# Patient Record
Sex: MALE | Race: WHITE | NOT HISPANIC OR LATINO | Employment: UNEMPLOYED | URBAN - NONMETROPOLITAN AREA
[De-identification: names, ages, dates, MRNs, and addresses within clinical notes are randomized per-mention and may not be internally consistent; named-entity substitution may affect disease eponyms.]

---

## 2017-03-22 ENCOUNTER — APPOINTMENT (OUTPATIENT)
Dept: LAB | Facility: HOSPITAL | Age: 52
End: 2017-03-22
Payer: MEDICARE

## 2017-03-22 ENCOUNTER — TRANSCRIBE ORDERS (OUTPATIENT)
Dept: ADMINISTRATIVE | Facility: HOSPITAL | Age: 52
End: 2017-03-22

## 2017-03-22 DIAGNOSIS — I10 UNSPECIFIED ESSENTIAL HYPERTENSION: ICD-10-CM

## 2017-03-22 DIAGNOSIS — R53.81 OTHER MALAISE AND FATIGUE: Primary | ICD-10-CM

## 2017-03-22 DIAGNOSIS — R53.83 OTHER MALAISE AND FATIGUE: Primary | ICD-10-CM

## 2017-03-22 DIAGNOSIS — D64.9 ANEMIA, UNSPECIFIED: ICD-10-CM

## 2017-03-22 DIAGNOSIS — R53.81 OTHER MALAISE AND FATIGUE: ICD-10-CM

## 2017-03-22 DIAGNOSIS — R53.83 OTHER MALAISE AND FATIGUE: ICD-10-CM

## 2017-03-22 LAB
ALBUMIN SERPL BCP-MCNC: 4 G/DL (ref 3.5–5)
ALP SERPL-CCNC: 85 U/L (ref 46–116)
ALT SERPL W P-5'-P-CCNC: 32 U/L (ref 12–78)
ANION GAP SERPL CALCULATED.3IONS-SCNC: 11 MMOL/L (ref 4–13)
AST SERPL W P-5'-P-CCNC: 30 U/L (ref 5–45)
BASOPHILS # BLD AUTO: 0.03 THOUSANDS/ΜL (ref 0–0.1)
BASOPHILS NFR BLD AUTO: 0 % (ref 0–1)
BILIRUB SERPL-MCNC: 0.5 MG/DL (ref 0.2–1)
BUN SERPL-MCNC: 24 MG/DL (ref 5–25)
CALCIUM SERPL-MCNC: 9.1 MG/DL (ref 8.3–10.1)
CHLORIDE SERPL-SCNC: 104 MMOL/L (ref 100–108)
CO2 SERPL-SCNC: 25 MMOL/L (ref 21–32)
CREAT SERPL-MCNC: 2.76 MG/DL (ref 0.6–1.3)
EOSINOPHIL # BLD AUTO: 0.37 THOUSAND/ΜL (ref 0–0.61)
EOSINOPHIL NFR BLD AUTO: 5 % (ref 0–6)
ERYTHROCYTE [DISTWIDTH] IN BLOOD BY AUTOMATED COUNT: 13.2 % (ref 11.6–15.1)
GFR SERPL CREATININE-BSD FRML MDRD: 24.2 ML/MIN/1.73SQ M
GLUCOSE P FAST SERPL-MCNC: 105 MG/DL (ref 65–99)
HCT VFR BLD AUTO: 49.4 % (ref 36.5–49.3)
HGB BLD-MCNC: 16.9 G/DL (ref 12–17)
LDLC SERPL DIRECT ASSAY-MCNC: 141 MG/DL (ref 0–100)
LYMPHOCYTES # BLD AUTO: 2.23 THOUSANDS/ΜL (ref 0.6–4.47)
LYMPHOCYTES NFR BLD AUTO: 29 % (ref 14–44)
MCH RBC QN AUTO: 31.1 PG (ref 26.8–34.3)
MCHC RBC AUTO-ENTMCNC: 34.2 G/DL (ref 31.4–37.4)
MCV RBC AUTO: 91 FL (ref 82–98)
MONOCYTES # BLD AUTO: 0.69 THOUSAND/ΜL (ref 0.17–1.22)
MONOCYTES NFR BLD AUTO: 9 % (ref 4–12)
NEUTROPHILS # BLD AUTO: 4.46 THOUSANDS/ΜL (ref 1.85–7.62)
NEUTS SEG NFR BLD AUTO: 57 % (ref 43–75)
PLATELET # BLD AUTO: 237 THOUSANDS/UL (ref 149–390)
PMV BLD AUTO: 9.2 FL (ref 8.9–12.7)
POTASSIUM SERPL-SCNC: 4.5 MMOL/L (ref 3.5–5.3)
PROT SERPL-MCNC: 7.2 G/DL (ref 6.4–8.2)
RBC # BLD AUTO: 5.44 MILLION/UL (ref 3.88–5.62)
SODIUM SERPL-SCNC: 140 MMOL/L (ref 136–145)
WBC # BLD AUTO: 7.78 THOUSAND/UL (ref 4.31–10.16)

## 2017-03-22 PROCEDURE — 80053 COMPREHEN METABOLIC PANEL: CPT

## 2017-03-22 PROCEDURE — 83721 ASSAY OF BLOOD LIPOPROTEIN: CPT

## 2017-03-22 PROCEDURE — 85025 COMPLETE CBC W/AUTO DIFF WBC: CPT

## 2017-03-22 PROCEDURE — 36415 COLL VENOUS BLD VENIPUNCTURE: CPT

## 2017-07-24 ENCOUNTER — TRANSCRIBE ORDERS (OUTPATIENT)
Dept: ADMINISTRATIVE | Facility: HOSPITAL | Age: 52
End: 2017-07-24

## 2017-07-24 ENCOUNTER — APPOINTMENT (OUTPATIENT)
Dept: LAB | Facility: HOSPITAL | Age: 52
End: 2017-07-24
Payer: MEDICARE

## 2017-07-24 DIAGNOSIS — E87.6 HYPOPOTASSEMIA: ICD-10-CM

## 2017-07-24 DIAGNOSIS — C67.9 MALIGNANT NEOPLASM OF URINARY BLADDER, UNSPECIFIED SITE (HCC): ICD-10-CM

## 2017-07-24 DIAGNOSIS — E87.2 ACIDOSIS: ICD-10-CM

## 2017-07-24 DIAGNOSIS — I10 UNSPECIFIED ESSENTIAL HYPERTENSION: ICD-10-CM

## 2017-07-24 DIAGNOSIS — N18.4 CHRONIC KIDNEY DISEASE, STAGE IV (SEVERE) (HCC): Primary | ICD-10-CM

## 2017-07-24 DIAGNOSIS — N18.4 CHRONIC KIDNEY DISEASE, STAGE IV (SEVERE) (HCC): ICD-10-CM

## 2017-07-24 LAB
ALBUMIN SERPL BCP-MCNC: 3.8 G/DL (ref 3.5–5)
ANION GAP SERPL CALCULATED.3IONS-SCNC: 13 MMOL/L (ref 4–13)
BUN SERPL-MCNC: 26 MG/DL (ref 5–25)
CALCIUM SERPL-MCNC: 9.1 MG/DL (ref 8.3–10.1)
CHLORIDE SERPL-SCNC: 104 MMOL/L (ref 100–108)
CO2 SERPL-SCNC: 25 MMOL/L (ref 21–32)
CREAT SERPL-MCNC: 2.84 MG/DL (ref 0.6–1.3)
GFR SERPL CREATININE-BSD FRML MDRD: 24 ML/MIN/1.73SQ M
GLUCOSE SERPL-MCNC: 104 MG/DL (ref 65–140)
HGB BLD-MCNC: 16.6 G/DL (ref 12–17)
MAGNESIUM SERPL-MCNC: 2.1 MG/DL (ref 1.6–2.6)
PHOSPHATE SERPL-MCNC: 3.6 MG/DL (ref 2.7–4.5)
POTASSIUM SERPL-SCNC: 3.8 MMOL/L (ref 3.5–5.3)
PTH-INTACT SERPL-MCNC: 83.6 PG/ML (ref 14–72)
SODIUM SERPL-SCNC: 142 MMOL/L (ref 136–145)
URATE SERPL-MCNC: 7 MG/DL (ref 4.2–8)

## 2017-07-24 PROCEDURE — 84550 ASSAY OF BLOOD/URIC ACID: CPT

## 2017-07-24 PROCEDURE — 83735 ASSAY OF MAGNESIUM: CPT

## 2017-07-24 PROCEDURE — 36415 COLL VENOUS BLD VENIPUNCTURE: CPT

## 2017-07-24 PROCEDURE — 80069 RENAL FUNCTION PANEL: CPT

## 2017-07-24 PROCEDURE — 83970 ASSAY OF PARATHORMONE: CPT

## 2017-07-24 PROCEDURE — 85018 HEMOGLOBIN: CPT

## 2017-08-08 ENCOUNTER — GENERIC CONVERSION - ENCOUNTER (OUTPATIENT)
Dept: OTHER | Facility: OTHER | Age: 52
End: 2017-08-08

## 2017-08-08 DIAGNOSIS — C67.6 MALIGNANT NEOPLASM OF URETERIC ORIFICE (HCC): ICD-10-CM

## 2017-08-21 ENCOUNTER — GENERIC CONVERSION - ENCOUNTER (OUTPATIENT)
Dept: OTHER | Facility: OTHER | Age: 52
End: 2017-08-21

## 2018-01-22 VITALS
WEIGHT: 194 LBS | BODY MASS INDEX: 27.16 KG/M2 | SYSTOLIC BLOOD PRESSURE: 134 MMHG | HEIGHT: 71 IN | DIASTOLIC BLOOD PRESSURE: 66 MMHG

## 2018-02-23 DIAGNOSIS — N52.8 OTHER MALE ERECTILE DYSFUNCTION: Primary | ICD-10-CM

## 2018-02-23 NOTE — TELEPHONE ENCOUNTER
Patient needs order for new medication or help getting this med,will also send to [facility] to see if she has any information for him

## 2018-03-12 NOTE — TELEPHONE ENCOUNTER
Patient called me back today and verified he's using 1 0mL per injection with success  Information given on [facility]  Script called in for Alprostadil 20mcg/mL Sig: Injection 1 0mL IC 20 minutes prior to sexual activity  Dispense: 5-2 5mL vials with 3 refills; spoke with Jef Kincaid Ph

## 2018-03-14 DIAGNOSIS — C67.6 MALIGNANT NEOPLASM OF URETERIC ORIFICE (HCC): Primary | ICD-10-CM

## 2018-03-14 DIAGNOSIS — N52.32 ERECTILE DYSFUNCTION FOLLOWING RADICAL CYSTECTOMY: ICD-10-CM

## 2018-03-14 NOTE — TELEPHONE ENCOUNTER
An Auto-fax Refill Request for Marinol (dronabinol) 5mg was received from [facility]/pharmacy #[id number] Patient was last seen in June, 2017 by Dr Dora Garcia; continuation of the medication was approved at that time  Please refill/E-scribe script  Thank you!

## 2018-03-15 RX ORDER — DRONABINOL 5 MG/1
5 CAPSULE ORAL DAILY
Qty: 90 CAPSULE | Refills: 0 | Status: SHIPPED | OUTPATIENT
Start: 2018-03-15 | End: 2018-07-25 | Stop reason: SDUPTHER

## 2018-04-10 ENCOUNTER — TRANSCRIBE ORDERS (OUTPATIENT)
Dept: ADMINISTRATIVE | Facility: HOSPITAL | Age: 53
End: 2018-04-10

## 2018-04-10 ENCOUNTER — APPOINTMENT (OUTPATIENT)
Dept: LAB | Facility: HOSPITAL | Age: 53
End: 2018-04-10
Payer: MEDICARE

## 2018-04-10 DIAGNOSIS — N18.4 CHRONIC KIDNEY DISEASE, STAGE IV (SEVERE) (HCC): Primary | ICD-10-CM

## 2018-04-10 DIAGNOSIS — D64.9 ANEMIA, UNSPECIFIED TYPE: ICD-10-CM

## 2018-04-10 DIAGNOSIS — E03.9 MYXEDEMA HEART DISEASE: ICD-10-CM

## 2018-04-10 DIAGNOSIS — C67.9 MALIGNANT NEOPLASM OF URINARY BLADDER, UNSPECIFIED SITE (HCC): ICD-10-CM

## 2018-04-10 DIAGNOSIS — D64.9 ANEMIA, UNSPECIFIED TYPE: Primary | ICD-10-CM

## 2018-04-10 DIAGNOSIS — E87.2 ACIDOSIS: ICD-10-CM

## 2018-04-10 DIAGNOSIS — I51.9 MYXEDEMA HEART DISEASE: ICD-10-CM

## 2018-04-10 DIAGNOSIS — I10 ESSENTIAL HYPERTENSION, MALIGNANT: ICD-10-CM

## 2018-04-10 DIAGNOSIS — N42.89 ATROPHY OF PROSTATE: ICD-10-CM

## 2018-04-10 DIAGNOSIS — E78.2 MIXED HYPERLIPIDEMIA: ICD-10-CM

## 2018-04-10 DIAGNOSIS — N18.4 CHRONIC KIDNEY DISEASE, STAGE IV (SEVERE) (HCC): ICD-10-CM

## 2018-04-10 LAB
ALBUMIN SERPL BCP-MCNC: 4 G/DL (ref 3.5–5)
ALP SERPL-CCNC: 89 U/L (ref 46–116)
ALT SERPL W P-5'-P-CCNC: 31 U/L (ref 12–78)
ANION GAP SERPL CALCULATED.3IONS-SCNC: 12 MMOL/L (ref 4–13)
AST SERPL W P-5'-P-CCNC: 20 U/L (ref 5–45)
BASOPHILS # BLD AUTO: 0.07 THOUSANDS/ΜL (ref 0–0.1)
BASOPHILS NFR BLD AUTO: 1 % (ref 0–1)
BILIRUB SERPL-MCNC: 0.5 MG/DL (ref 0.2–1)
BUN SERPL-MCNC: 32 MG/DL (ref 5–25)
CALCIUM SERPL-MCNC: 9.7 MG/DL
CHLORIDE SERPL-SCNC: 106 MMOL/L (ref 100–108)
CHOLEST SERPL-MCNC: 185 MG/DL (ref 50–200)
CO2 SERPL-SCNC: 24 MMOL/L (ref 21–32)
CREAT SERPL-MCNC: 2.36 MG/DL (ref 0.6–1.3)
CREAT UR-MCNC: 44.5 MG/DL
EOSINOPHIL # BLD AUTO: 0.22 THOUSAND/ΜL (ref 0–0.61)
EOSINOPHIL NFR BLD AUTO: 3 % (ref 0–6)
ERYTHROCYTE [DISTWIDTH] IN BLOOD BY AUTOMATED COUNT: 13.3 % (ref 11.6–15.1)
EST. AVERAGE GLUCOSE BLD GHB EST-MCNC: 114 MG/DL
GFR SERPL CREATININE-BSD FRML MDRD: 30 ML/MIN/1.73SQ M
GLUCOSE P FAST SERPL-MCNC: 105 MG/DL (ref 65–99)
HBA1C MFR BLD: 5.6 % (ref 4.2–6.3)
HCT VFR BLD AUTO: 48 % (ref 36.5–49.3)
HDLC SERPL-MCNC: 53 MG/DL (ref 40–60)
HGB BLD-MCNC: 16.9 G/DL (ref 12–17)
LDLC SERPL CALC-MCNC: 115 MG/DL (ref 0–100)
LYMPHOCYTES # BLD AUTO: 2.01 THOUSANDS/ΜL (ref 0.6–4.47)
LYMPHOCYTES NFR BLD AUTO: 23 % (ref 14–44)
MAGNESIUM SERPL-MCNC: 2 MG/DL (ref 1.6–2.6)
MCH RBC QN AUTO: 31.8 PG (ref 26.8–34.3)
MCHC RBC AUTO-ENTMCNC: 35.2 G/DL (ref 31.4–37.4)
MCV RBC AUTO: 90 FL (ref 82–98)
MONOCYTES # BLD AUTO: 0.77 THOUSAND/ΜL (ref 0.17–1.22)
MONOCYTES NFR BLD AUTO: 9 % (ref 4–12)
NEUTROPHILS # BLD AUTO: 5.8 THOUSANDS/ΜL (ref 1.85–7.62)
NEUTS SEG NFR BLD AUTO: 64 % (ref 43–75)
NONHDLC SERPL-MCNC: 132 MG/DL
PHOSPHATE SERPL-MCNC: 2.6 MG/DL (ref 2.7–4.5)
PLATELET # BLD AUTO: 237 THOUSANDS/UL (ref 149–390)
PMV BLD AUTO: 9.3 FL (ref 8.9–12.7)
POTASSIUM SERPL-SCNC: 4.3 MMOL/L (ref 3.5–5.3)
PROT SERPL-MCNC: 7.2 G/DL (ref 6.4–8.2)
PROT UR-MCNC: 27 MG/DL
PROT/CREAT UR: 0.61 MG/G{CREAT} (ref 0–0.1)
PSA SERPL-MCNC: <0.1 NG/ML (ref 0–4)
PTH-INTACT SERPL-MCNC: 62.1 PG/ML (ref 18.4–80.1)
RBC # BLD AUTO: 5.31 MILLION/UL (ref 3.88–5.62)
SODIUM SERPL-SCNC: 142 MMOL/L (ref 136–145)
T4 FREE SERPL-MCNC: 1.18 NG/DL (ref 0.76–1.46)
TRIGL SERPL-MCNC: 86 MG/DL
TSH SERPL DL<=0.05 MIU/L-ACNC: 1.22 UIU/ML (ref 0.36–3.74)
URATE SERPL-MCNC: 5.7 MG/DL (ref 4.2–8)
WBC # BLD AUTO: 8.87 THOUSAND/UL (ref 4.31–10.16)

## 2018-04-10 PROCEDURE — 84439 ASSAY OF FREE THYROXINE: CPT

## 2018-04-10 PROCEDURE — 84156 ASSAY OF PROTEIN URINE: CPT | Performed by: INTERNAL MEDICINE

## 2018-04-10 PROCEDURE — 83735 ASSAY OF MAGNESIUM: CPT

## 2018-04-10 PROCEDURE — G0103 PSA SCREENING: HCPCS

## 2018-04-10 PROCEDURE — 36415 COLL VENOUS BLD VENIPUNCTURE: CPT

## 2018-04-10 PROCEDURE — 82570 ASSAY OF URINE CREATININE: CPT | Performed by: INTERNAL MEDICINE

## 2018-04-10 PROCEDURE — 80061 LIPID PANEL: CPT

## 2018-04-10 PROCEDURE — 85025 COMPLETE CBC W/AUTO DIFF WBC: CPT

## 2018-04-10 PROCEDURE — 83970 ASSAY OF PARATHORMONE: CPT

## 2018-04-10 PROCEDURE — 84550 ASSAY OF BLOOD/URIC ACID: CPT

## 2018-04-10 PROCEDURE — 84443 ASSAY THYROID STIM HORMONE: CPT

## 2018-04-10 PROCEDURE — 84100 ASSAY OF PHOSPHORUS: CPT

## 2018-04-10 PROCEDURE — 80053 COMPREHEN METABOLIC PANEL: CPT

## 2018-07-25 DIAGNOSIS — C67.6 MALIGNANT NEOPLASM OF URETERIC ORIFICE (HCC): ICD-10-CM

## 2018-07-25 RX ORDER — DRONABINOL 5 MG/1
5 CAPSULE ORAL DAILY
Qty: 90 CAPSULE | Refills: 0 | OUTPATIENT
Start: 2018-07-25 | End: 2019-03-06 | Stop reason: SDUPTHER

## 2018-07-25 NOTE — TELEPHONE ENCOUNTER
An Auto-fax Refill Request for Marinol (dronabinol) 5mg was received from [facility]/pharmacy #[id number] Patient has an upcoming office visit scheduled on 8/14/18 and will run out of medication until then  Script called into [facility]/pharmacy #[id number]; order given to Enio Freedman Ph   Script for same, 90 day supply with NO refills was queued and forwarded to Dr Jamari Orozco for approval

## 2018-08-02 ENCOUNTER — HOSPITAL ENCOUNTER (OUTPATIENT)
Dept: ULTRASOUND IMAGING | Facility: HOSPITAL | Age: 53
Discharge: HOME | End: 2018-08-02
Attending: UROLOGY
Payer: MEDICARE

## 2018-08-02 DIAGNOSIS — C67.6 MALIGNANT NEOPLASM OF URETERIC ORIFICE (HCC): ICD-10-CM

## 2018-08-02 PROCEDURE — 76775 US EXAM ABDO BACK WALL LIM: CPT

## 2018-08-08 RX ORDER — POTASSIUM CITRATE 15 MEQ/1
TABLET, EXTENDED RELEASE ORAL
COMMUNITY

## 2018-08-08 RX ORDER — ERGOCALCIFEROL (VITAMIN D2) 10 MCG
1 TABLET ORAL DAILY
COMMUNITY

## 2018-08-08 RX ORDER — AMLODIPINE BESYLATE 10 MG/1
TABLET ORAL
COMMUNITY
Start: 2011-06-17 | End: 2021-08-23 | Stop reason: SDUPTHER

## 2018-08-08 RX ORDER — SODIUM BICARBONATE 650 MG/1
TABLET ORAL
COMMUNITY

## 2018-08-08 RX ORDER — METOPROLOL SUCCINATE 25 MG/1
1 TABLET, EXTENDED RELEASE ORAL DAILY
COMMUNITY
End: 2021-08-23 | Stop reason: SDUPTHER

## 2018-08-14 ENCOUNTER — OFFICE VISIT (OUTPATIENT)
Dept: UROLOGY | Facility: MEDICAL CENTER | Age: 53
End: 2018-08-14
Payer: MEDICARE

## 2018-08-14 VITALS
DIASTOLIC BLOOD PRESSURE: 70 MMHG | WEIGHT: 194 LBS | HEIGHT: 70 IN | SYSTOLIC BLOOD PRESSURE: 118 MMHG | BODY MASS INDEX: 27.77 KG/M2

## 2018-08-14 DIAGNOSIS — C67.6 MALIGNANT NEOPLASM OF URETERIC ORIFICE (HCC): Primary | ICD-10-CM

## 2018-08-14 DIAGNOSIS — N26.1 ATROPHY OF RIGHT KIDNEY: ICD-10-CM

## 2018-08-14 DIAGNOSIS — N52.32 ERECTILE DYSFUNCTION FOLLOWING RADICAL CYSTECTOMY: ICD-10-CM

## 2018-08-14 DIAGNOSIS — Z90.6 HX OF TOTAL CYSTECTOMY: ICD-10-CM

## 2018-08-14 PROCEDURE — 99214 OFFICE O/P EST MOD 30 MIN: CPT | Performed by: UROLOGY

## 2018-08-14 NOTE — PROGRESS NOTES
Assessment/Plan:      Diagnoses and all orders for this visit:    Malignant neoplasm of ureteric orifice (HCC)  -     XR chest pa & lateral; Future  -     US kidney and bladder; Future    Erectile dysfunction following radical cystectomy    Hx of total cystectomy  -     XR chest pa & lateral; Future  -     US kidney and bladder; Future    Atrophy of right kidney  -     XR chest pa & lateral; Future  -     US kidney and bladder; Future          Subjective:  No complaints     Patient ID: Bucky Perkins is a 45 y o  male  HPI  He is here for follow-up of his bladder cancer treatment  He had undergone a radical cystectomy with ileal conduit urinary diversion 9 years ago, which was followed by chemotherapy, 8 years ago  He has done well since, in terms of his disease free status  His stoma and appliance appliance are working well, and he is not having any issues with it  He has known atrophy of his right kidney  He denies any pains, weight loss, or hematuria  He has a good appetite and bowel function  Concerning his ED he has been using penile injection therapy with Edex  Review of Systems   Constitutional: Negative  Negative for unexpected weight change  HENT: Negative  Eyes: Negative  Respiratory: Negative  Cardiovascular: Negative  Gastrointestinal: Negative  Negative for abdominal pain  Endocrine: Negative  Genitourinary: Negative  Negative for flank pain and hematuria  Musculoskeletal: Negative  Skin: Negative  Allergic/Immunologic: Negative  Neurological: Negative  Hematological: Negative  Psychiatric/Behavioral: Negative  Objective:     Physical Exam   Constitutional: He is oriented to person, place, and time  He appears well-developed and well-nourished  No distress  HENT:   Head: Normocephalic and atraumatic     Nose: Nose normal    Mouth/Throat: Oropharynx is clear and moist    Eyes: Conjunctivae and EOM are normal  Pupils are equal, round, and reactive to light  No scleral icterus  Neck: Normal range of motion  Neck supple  Cardiovascular: Normal rate, regular rhythm, normal heart sounds and intact distal pulses  No murmur heard  Pulmonary/Chest: Effort normal and breath sounds normal  No respiratory distress  He has no wheezes  He has no rales  Abdominal: Soft  Bowel sounds are normal  He exhibits no distension and no mass  There is no tenderness  There is no CVA tenderness  Hernia confirmed negative in the ventral area  Ileal stoma intact, appliance fits well  Musculoskeletal: Normal range of motion  He exhibits no edema or tenderness  Lymphadenopathy:     He has no cervical adenopathy  Neurological: He is alert and oriented to person, place, and time  No cranial nerve deficit  Skin: Skin is warm and dry  No rash noted  No erythema  No pallor  Psychiatric: He has a normal mood and affect  His behavior is normal  Judgment and thought content normal    Nursing note and vitals reviewed  Renal ultrasound from 08/02/2018: IMPRESSION:     1  Mild bilateral pelviectasis and proximal hydroureter is similar to priors dating back to 2014  No evidence for worsening obstruction      2   Lobular left kidney and atrophic right kidney  Appearance of the kidneys is stable from priors      3   Punctate nonobstructive right upper pole nephrolith is demonstrated

## 2019-02-21 ENCOUNTER — TELEPHONE (OUTPATIENT)
Dept: UROLOGY | Facility: AMBULATORY SURGERY CENTER | Age: 54
End: 2019-02-21

## 2019-02-21 NOTE — TELEPHONE ENCOUNTER
Patient called wanted to order ostomy bags  Kevin patient care number [phone]  If you have any questions you can call patient on his mobile

## 2019-02-22 ENCOUNTER — APPOINTMENT (EMERGENCY)
Dept: RADIOLOGY | Facility: HOSPITAL | Age: 54
End: 2019-02-22
Payer: COMMERCIAL

## 2019-02-22 ENCOUNTER — HOSPITAL ENCOUNTER (EMERGENCY)
Facility: HOSPITAL | Age: 54
Discharge: HOME | End: 2019-02-22
Attending: EMERGENCY MEDICINE | Admitting: EMERGENCY MEDICINE
Payer: COMMERCIAL

## 2019-02-22 VITALS
OXYGEN SATURATION: 100 % | RESPIRATION RATE: 18 BRPM | TEMPERATURE: 97.5 F | HEART RATE: 70 BPM | SYSTOLIC BLOOD PRESSURE: 147 MMHG | WEIGHT: 190 LBS | BODY MASS INDEX: 26.6 KG/M2 | DIASTOLIC BLOOD PRESSURE: 78 MMHG | HEIGHT: 71 IN

## 2019-02-22 DIAGNOSIS — S01.511A LIP LACERATION, INITIAL ENCOUNTER: ICD-10-CM

## 2019-02-22 DIAGNOSIS — S51.819A SKIN TEAR OF FOREARM WITHOUT COMPLICATION: ICD-10-CM
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
## 2019-02-22 DIAGNOSIS — M79.603 ARM PAIN: ICD-10-CM

## 2019-02-22 PROCEDURE — 73090 X-RAY EXAM OF FOREARM: CPT

## 2019-02-22 PROCEDURE — 70450 CT HEAD/BRAIN W/O DYE: CPT

## 2019-02-22 PROCEDURE — 99284 EMERGENCY DEPT VISIT MOD MDM: CPT

## 2019-02-22 PROCEDURE — 90715 TDAP VACCINE 7 YRS/> IM: CPT | Performed by: EMERGENCY MEDICINE

## 2019-02-22 PROCEDURE — 90471 IMMUNIZATION ADMIN: CPT

## 2019-02-22 RX ORDER — ACETAMINOPHEN 325 MG/1
975 TABLET ORAL ONCE
Status: COMPLETED | OUTPATIENT
Start: 2019-02-22 | End: 2019-02-22

## 2019-02-22 RX ORDER — LIDOCAINE HYDROCHLORIDE AND EPINEPHRINE 10; 10 MG/ML; UG/ML
10 INJECTION, SOLUTION INFILTRATION; PERINEURAL ONCE
Status: COMPLETED | OUTPATIENT
Start: 2019-02-22 | End: 2019-02-22

## 2019-02-22 RX ADMIN — TETANUS TOXOID, REDUCED DIPHTHERIA TOXOID AND ACELLULAR PERTUSSIS VACCINE, ADSORBED 0.5 ML: 5; 2.5; 8; 8; 2.5 SUSPENSION INTRAMUSCULAR at 01:20

## 2019-02-22 RX ADMIN — LIDOCAINE HYDROCHLORIDE,EPINEPHRINE BITARTRATE 10 ML: 10; .01 INJECTION, SOLUTION INFILTRATION; PERINEURAL at 01:20

## 2019-02-22 RX ADMIN — ACETAMINOPHEN 975 MG: 325 TABLET ORAL at 01:20

## 2019-02-22 NOTE — ED NOTES
Dr Caroline Freedman at the bedside for patient evaluation at this time     Annetta Rosales, RN  02/22/19 5575
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
## 2019-02-22 NOTE — DISCHARGE INSTRUCTIONS
Your stitches should remain in for approximately 10-14 days, he can return to the emergency department or your primary care physician to have them removed and evaluated at that time

## 2019-03-06 DIAGNOSIS — C67.6 MALIGNANT NEOPLASM OF URETERIC ORIFICE (HCC): ICD-10-CM

## 2019-03-06 RX ORDER — DRONABINOL 5 MG/1
5 CAPSULE ORAL DAILY
Qty: 90 CAPSULE | Refills: 1 | Status: SHIPPED | OUTPATIENT
Start: 2019-03-06 | End: 2020-03-23

## 2019-03-06 NOTE — TELEPHONE ENCOUNTER
An Auto-fax Refill Request for Dronabinol (MARINOL) 5mg was received from [facility]/pharmacy #[id number] Patient was last seen in August, 2018; continuation of the medication was approved at that time  He has an upcoming office visit scheduled for 8/21/19 but will run out of medication until then    Script for same, 90 day supply with 1 refill was queued and forwarded to AVNESA Tejeda for approval

## 2019-06-17 ENCOUNTER — TELEPHONE (OUTPATIENT)
Dept: UROLOGY | Facility: MEDICAL CENTER | Age: 54
End: 2019-06-17

## 2019-06-17 DIAGNOSIS — N52.8 OTHER MALE ERECTILE DYSFUNCTION: ICD-10-CM

## 2019-08-16 ENCOUNTER — HOSPITAL ENCOUNTER (OUTPATIENT)
Dept: ULTRASOUND IMAGING | Facility: HOSPITAL | Age: 54
Discharge: HOME | End: 2019-08-16
Attending: UROLOGY
Payer: MEDICARE

## 2019-08-16 ENCOUNTER — HOSPITAL ENCOUNTER (OUTPATIENT)
Dept: RADIOLOGY | Facility: HOSPITAL | Age: 54
Discharge: HOME | End: 2019-08-16
Attending: UROLOGY
Payer: MEDICARE

## 2019-08-16 DIAGNOSIS — Z90.6 HX OF TOTAL CYSTECTOMY: ICD-10-CM

## 2019-08-16 DIAGNOSIS — N26.1 ATROPHY OF RIGHT KIDNEY: ICD-10-CM

## 2019-08-16 DIAGNOSIS — C67.6 MALIGNANT NEOPLASM OF URETERIC ORIFICE (HCC): ICD-10-CM

## 2019-08-16 PROCEDURE — 71046 X-RAY EXAM CHEST 2 VIEWS: CPT

## 2019-08-16 PROCEDURE — 76770 US EXAM ABDO BACK WALL COMP: CPT

## 2019-08-21 ENCOUNTER — OFFICE VISIT (OUTPATIENT)
Dept: UROLOGY | Facility: MEDICAL CENTER | Age: 54
End: 2019-08-21
Payer: MEDICARE

## 2019-08-21 VITALS
HEART RATE: 76 BPM | SYSTOLIC BLOOD PRESSURE: 118 MMHG | HEIGHT: 71 IN | DIASTOLIC BLOOD PRESSURE: 68 MMHG | BODY MASS INDEX: 22.68 KG/M2 | WEIGHT: 162 LBS

## 2019-08-21 DIAGNOSIS — C67.6 MALIGNANT NEOPLASM OF URETERIC ORIFICE (HCC): Primary | ICD-10-CM

## 2019-08-21 DIAGNOSIS — N52.8 OTHER MALE ERECTILE DYSFUNCTION: ICD-10-CM

## 2019-08-21 DIAGNOSIS — N26.1 ATROPHY OF RIGHT KIDNEY: ICD-10-CM

## 2019-08-21 DIAGNOSIS — Z90.6 HISTORY OF TOTAL CYSTECTOMY: ICD-10-CM

## 2019-08-21 PROCEDURE — 99214 OFFICE O/P EST MOD 30 MIN: CPT | Performed by: UROLOGY

## 2019-08-21 NOTE — PROGRESS NOTES
Assessment/Plan:      Diagnoses and all orders for this visit:    Malignant neoplasm of ureteric orifice ([address] )  -     US kidney and bladder; Future  -     XR chest pa & lateral; Future    Other male erectile dysfunction    History of total cystectomy  -     US kidney and bladder; Future  -     XR chest pa & lateral; Future    Atrophy of right kidney  -     US kidney and bladder; Future  -     XR chest pa & lateral; Future    Other orders  -     alprostadil (EDEX) 20 MCG injection; by Intracavernosal route          Subjective:  No complaints     Patient ID: Carolyn Wiseman is a 52 y o  male  HPI  He is here for follow-up of his bladder cancer treatment  He had undergone a radical cystectomy with ileal conduit urinary diversion 10 years ago, which was followed by chemotherapy, 9 years ago  He has done well since, in terms of his disease free status  His stoma and appliance appliance are working well, and he is not having any issues with it  He has known atrophy of his right kidney  He denies any pains, weight loss, or hematuria  He has a good appetite and bowel function      Concerning his ED he has been using penile injection therapy with Edex      Review of Systems   Constitutional: Negative  HENT: Negative  Eyes: Negative  Respiratory: Negative  Cardiovascular: Negative  Gastrointestinal: Negative  Negative for abdominal pain  Endocrine: Negative  Genitourinary: Negative  Negative for flank pain  Musculoskeletal: Negative  Skin: Negative  Allergic/Immunologic: Negative  Neurological: Negative  Hematological: Negative  Psychiatric/Behavioral: Negative  Objective:     Physical Exam   Constitutional: He is oriented to person, place, and time  He appears well-developed and well-nourished  No distress  HENT:   Head: Normocephalic and atraumatic     Nose: Nose normal    Mouth/Throat: Oropharynx is clear and moist    Eyes: Pupils are equal, round, and reactive to light  Conjunctivae and EOM are normal  No scleral icterus  Neck: Normal range of motion  Neck supple  Cardiovascular: Normal rate, regular rhythm, normal heart sounds and intact distal pulses  No murmur heard  Pulmonary/Chest: Effort normal and breath sounds normal  No respiratory distress  He has no wheezes  He has no rales  Abdominal: Soft  Bowel sounds are normal  He exhibits no distension and no mass  There is no tenderness  Ileal stoma intact, appliance fits well  Musculoskeletal: Normal range of motion  He exhibits no edema or tenderness  Lymphadenopathy:     He has no cervical adenopathy  Neurological: He is alert and oriented to person, place, and time  No cranial nerve deficit  Skin: Skin is warm and dry  No rash noted  No erythema  No pallor  Psychiatric: He has a normal mood and affect  His behavior is normal  Judgment and thought content normal    Nursing note and vitals reviewed  Renal ultrasound results from 08/16/2019 pending    Chest x-ray from 08/16/2019:  IMPRESSION:  No acute cardiopulmonary disease      Mild chronic obstructive airway changes

## 2020-01-29 ENCOUNTER — TRANSCRIBE ORDERS (OUTPATIENT)
Dept: ADMINISTRATIVE | Facility: HOSPITAL | Age: 55
End: 2020-01-29

## 2020-01-29 ENCOUNTER — APPOINTMENT (OUTPATIENT)
Dept: LAB | Facility: HOSPITAL | Age: 55
End: 2020-01-29
Payer: MEDICARE

## 2020-01-29 DIAGNOSIS — I10 ESSENTIAL HYPERTENSION, MALIGNANT: ICD-10-CM

## 2020-01-29 DIAGNOSIS — E11.9 TYPE 2 DIABETES MELLITUS WITHOUT COMPLICATION, UNSPECIFIED WHETHER LONG TERM INSULIN USE (HCC): ICD-10-CM

## 2020-01-29 DIAGNOSIS — M19.90 SENILE ARTHRITIS: ICD-10-CM

## 2020-01-29 DIAGNOSIS — D64.9 ANEMIA, UNSPECIFIED TYPE: ICD-10-CM

## 2020-01-29 DIAGNOSIS — D64.9 ANEMIA, UNSPECIFIED TYPE: Primary | ICD-10-CM

## 2020-01-29 DIAGNOSIS — R35.0 URINARY FREQUENCY: ICD-10-CM

## 2020-01-29 LAB
ALBUMIN SERPL BCP-MCNC: 3.4 G/DL (ref 3.5–5)
ALP SERPL-CCNC: 108 U/L (ref 46–116)
ALT SERPL W P-5'-P-CCNC: 30 U/L (ref 12–78)
ANION GAP SERPL CALCULATED.3IONS-SCNC: 10 MMOL/L (ref 4–13)
AST SERPL W P-5'-P-CCNC: 18 U/L (ref 5–45)
BACTERIA UR QL AUTO: ABNORMAL /HPF
BASOPHILS # BLD AUTO: 0.05 THOUSANDS/ΜL (ref 0–0.1)
BASOPHILS NFR BLD AUTO: 1 % (ref 0–1)
BILIRUB SERPL-MCNC: 0.2 MG/DL (ref 0.2–1)
BILIRUB UR QL STRIP: NEGATIVE
BUN SERPL-MCNC: 40 MG/DL (ref 5–25)
CALCIUM SERPL-MCNC: 8.9 MG/DL (ref 8.3–10.1)
CHLORIDE SERPL-SCNC: 108 MMOL/L (ref 100–108)
CHOLEST SERPL-MCNC: 161 MG/DL (ref 50–200)
CLARITY UR: ABNORMAL
CO2 SERPL-SCNC: 22 MMOL/L (ref 21–32)
COLOR UR: ABNORMAL
CREAT SERPL-MCNC: 2.46 MG/DL (ref 0.6–1.3)
EOSINOPHIL # BLD AUTO: 0.33 THOUSAND/ΜL (ref 0–0.61)
EOSINOPHIL NFR BLD AUTO: 3 % (ref 0–6)
ERYTHROCYTE [DISTWIDTH] IN BLOOD BY AUTOMATED COUNT: 13.1 % (ref 11.6–15.1)
EST. AVERAGE GLUCOSE BLD GHB EST-MCNC: 111 MG/DL
GFR SERPL CREATININE-BSD FRML MDRD: 28 ML/MIN/1.73SQ M
GLUCOSE P FAST SERPL-MCNC: 108 MG/DL (ref 65–99)
GLUCOSE UR STRIP-MCNC: NEGATIVE MG/DL
HBA1C MFR BLD: 5.5 % (ref 4.2–6.3)
HCT VFR BLD AUTO: 46.9 % (ref 36.5–49.3)
HDLC SERPL-MCNC: 56 MG/DL
HGB BLD-MCNC: 15 G/DL (ref 12–17)
HGB UR QL STRIP.AUTO: ABNORMAL
IMM GRANULOCYTES # BLD AUTO: 0.03 THOUSAND/UL (ref 0–0.2)
IMM GRANULOCYTES NFR BLD AUTO: 0 % (ref 0–2)
KETONES UR STRIP-MCNC: NEGATIVE MG/DL
LDLC SERPL CALC-MCNC: 94 MG/DL (ref 0–100)
LDLC SERPL DIRECT ASSAY-MCNC: 91 MG/DL (ref 0–100)
LEUKOCYTE ESTERASE UR QL STRIP: ABNORMAL
LYMPHOCYTES # BLD AUTO: 1.59 THOUSANDS/ΜL (ref 0.6–4.47)
LYMPHOCYTES NFR BLD AUTO: 16 % (ref 14–44)
MCH RBC QN AUTO: 31.1 PG (ref 26.8–34.3)
MCHC RBC AUTO-ENTMCNC: 32 G/DL (ref 31.4–37.4)
MCV RBC AUTO: 97 FL (ref 82–98)
MONOCYTES # BLD AUTO: 0.87 THOUSAND/ΜL (ref 0.17–1.22)
MONOCYTES NFR BLD AUTO: 9 % (ref 4–12)
NEUTROPHILS # BLD AUTO: 6.98 THOUSANDS/ΜL (ref 1.85–7.62)
NEUTS SEG NFR BLD AUTO: 71 % (ref 43–75)
NITRITE UR QL STRIP: POSITIVE
NON-SQ EPI CELLS URNS QL MICRO: ABNORMAL /HPF
NONHDLC SERPL-MCNC: 105 MG/DL
NRBC BLD AUTO-RTO: 0 /100 WBCS
OTHER STN SPEC: ABNORMAL
PH UR STRIP.AUTO: 7.5 [PH]
PLATELET # BLD AUTO: 268 THOUSANDS/UL (ref 149–390)
PMV BLD AUTO: 8.9 FL (ref 8.9–12.7)
POTASSIUM SERPL-SCNC: 4 MMOL/L (ref 3.5–5.3)
PROT SERPL-MCNC: 7.3 G/DL (ref 6.4–8.2)
PROT UR STRIP-MCNC: NEGATIVE MG/DL
RBC # BLD AUTO: 4.83 MILLION/UL (ref 3.88–5.62)
RBC #/AREA URNS AUTO: ABNORMAL /HPF
SODIUM SERPL-SCNC: 140 MMOL/L (ref 136–145)
SP GR UR STRIP.AUTO: 1.01 (ref 1–1.03)
T4 FREE SERPL-MCNC: 1.03 NG/DL (ref 0.76–1.46)
TRIGL SERPL-MCNC: 57 MG/DL
TSH SERPL DL<=0.05 MIU/L-ACNC: 1.41 UIU/ML (ref 0.36–3.74)
UROBILINOGEN UR QL STRIP.AUTO: 0.2 E.U./DL
WBC # BLD AUTO: 9.85 THOUSAND/UL (ref 4.31–10.16)
WBC #/AREA URNS AUTO: ABNORMAL /HPF

## 2020-01-29 PROCEDURE — 80053 COMPREHEN METABOLIC PANEL: CPT

## 2020-01-29 PROCEDURE — 84439 ASSAY OF FREE THYROXINE: CPT

## 2020-01-29 PROCEDURE — 83721 ASSAY OF BLOOD LIPOPROTEIN: CPT

## 2020-01-29 PROCEDURE — 84443 ASSAY THYROID STIM HORMONE: CPT

## 2020-01-29 PROCEDURE — 36415 COLL VENOUS BLD VENIPUNCTURE: CPT

## 2020-01-29 PROCEDURE — 83036 HEMOGLOBIN GLYCOSYLATED A1C: CPT

## 2020-01-29 PROCEDURE — 80061 LIPID PANEL: CPT

## 2020-01-29 PROCEDURE — 85025 COMPLETE CBC W/AUTO DIFF WBC: CPT

## 2020-01-29 PROCEDURE — 81001 URINALYSIS AUTO W/SCOPE: CPT

## 2020-03-09 ENCOUNTER — TRANSCRIBE ORDERS (OUTPATIENT)
Dept: ADMINISTRATIVE | Facility: HOSPITAL | Age: 55
End: 2020-03-09

## 2020-03-09 ENCOUNTER — APPOINTMENT (OUTPATIENT)
Dept: LAB | Facility: HOSPITAL | Age: 55
End: 2020-03-09
Payer: MEDICARE

## 2020-03-09 DIAGNOSIS — N18.30 CHRONIC KIDNEY DISEASE, STAGE III (MODERATE) (HCC): ICD-10-CM

## 2020-03-09 DIAGNOSIS — N18.30 CHRONIC KIDNEY DISEASE, STAGE III (MODERATE) (HCC): Primary | ICD-10-CM

## 2020-03-09 LAB
25(OH)D3 SERPL-MCNC: 37.7 NG/ML (ref 30–100)
ANION GAP SERPL CALCULATED.3IONS-SCNC: 12 MMOL/L (ref 4–13)
BUN SERPL-MCNC: 40 MG/DL (ref 5–25)
CALCIUM SERPL-MCNC: 8.9 MG/DL (ref 8.3–10.1)
CHLORIDE SERPL-SCNC: 108 MMOL/L (ref 100–108)
CO2 SERPL-SCNC: 18 MMOL/L (ref 21–32)
CREAT SERPL-MCNC: 3.01 MG/DL (ref 0.6–1.3)
GFR SERPL CREATININE-BSD FRML MDRD: 22 ML/MIN/1.73SQ M
GLUCOSE SERPL-MCNC: 117 MG/DL (ref 65–140)
HCT VFR BLD AUTO: 44.3 % (ref 36.5–49.3)
HGB BLD-MCNC: 14.4 G/DL (ref 12–17)
MAGNESIUM SERPL-MCNC: 2.1 MG/DL (ref 1.6–2.6)
PHOSPHATE SERPL-MCNC: 3.1 MG/DL (ref 2.7–4.5)
POTASSIUM SERPL-SCNC: 3.9 MMOL/L (ref 3.5–5.3)
PTH-INTACT SERPL-MCNC: 97.7 PG/ML (ref 18.4–80.1)
SODIUM SERPL-SCNC: 138 MMOL/L (ref 136–145)
URATE SERPL-MCNC: 5.7 MG/DL (ref 4.2–8)

## 2020-03-09 PROCEDURE — 83735 ASSAY OF MAGNESIUM: CPT

## 2020-03-09 PROCEDURE — 85018 HEMOGLOBIN: CPT

## 2020-03-09 PROCEDURE — 82306 VITAMIN D 25 HYDROXY: CPT

## 2020-03-09 PROCEDURE — 85014 HEMATOCRIT: CPT

## 2020-03-09 PROCEDURE — 83970 ASSAY OF PARATHORMONE: CPT

## 2020-03-09 PROCEDURE — 36415 COLL VENOUS BLD VENIPUNCTURE: CPT

## 2020-03-09 PROCEDURE — 84550 ASSAY OF BLOOD/URIC ACID: CPT

## 2020-03-09 PROCEDURE — 84100 ASSAY OF PHOSPHORUS: CPT

## 2020-03-09 PROCEDURE — 80048 BASIC METABOLIC PNL TOTAL CA: CPT

## 2020-03-23 DIAGNOSIS — C67.6 MALIGNANT NEOPLASM OF URETERIC ORIFICE (HCC): ICD-10-CM

## 2020-03-23 RX ORDER — DRONABINOL 5 MG/1
CAPSULE ORAL
Qty: 90 CAPSULE | Refills: 0 | Status: SHIPPED | OUTPATIENT
Start: 2020-03-23 | End: 2021-08-23

## 2020-08-08 ENCOUNTER — HOSPITAL ENCOUNTER (EMERGENCY)
Facility: HOSPITAL | Age: 55
Discharge: HOME | End: 2020-08-08
Attending: EMERGENCY MEDICINE | Admitting: EMERGENCY MEDICINE
Payer: MEDICARE

## 2020-08-08 VITALS
BODY MASS INDEX: 23.01 KG/M2 | RESPIRATION RATE: 18 BRPM | SYSTOLIC BLOOD PRESSURE: 145 MMHG | HEART RATE: 82 BPM | WEIGHT: 165 LBS | DIASTOLIC BLOOD PRESSURE: 78 MMHG | OXYGEN SATURATION: 98 % | TEMPERATURE: 98 F

## 2020-08-08 DIAGNOSIS — S61.210A LACERATION OF RIGHT INDEX FINGER WITHOUT FOREIGN BODY WITHOUT DAMAGE TO NAIL, INITIAL ENCOUNTER: Primary | ICD-10-CM

## 2020-08-08 PROCEDURE — 99282 EMERGENCY DEPT VISIT SF MDM: CPT

## 2020-08-08 PROCEDURE — G0168 WOUND CLOSURE BY ADHESIVE: HCPCS | Performed by: PHYSICIAN ASSISTANT

## 2020-08-08 PROCEDURE — 99282 EMERGENCY DEPT VISIT SF MDM: CPT | Performed by: PHYSICIAN ASSISTANT

## 2020-08-08 NOTE — DISCHARGE INSTRUCTIONS
Continue to keep area clean and dry; steri strips and glue will fall off on its own  Watch for signs of infection such as increased redness, warmth, discharge/drainage, fever or any other concerns  OTC meds as needed for pain relief  Follow up with PCP or return to ER as needed

## 2020-08-08 NOTE — ED PROVIDER NOTES
History  Chief Complaint   Patient presents with    Finger Laceration     cut right 2nd finger 1 hr ago  on piece of rebar     62year old male presents for evaluation of a right finger laceration  This occurred today about an hour prior to arrival   He notes he was moving stuff and loading rebar on a trailer when he pushed and the rebar came back and sliced his finger  He notes he immediately cleaned the area and applied pressure  He didn't really think much about the injury but people with him advised him to come in  Pt is right hand dominant  Has had normal ROM of the fingers  Pt unsure of tetanus shot but thinks it was updated here  Pt otherwise has been in usual state of health and offers no other complaints  Pt denies aspirin or blood thinners  History provided by:  Patient   used: No    Finger Laceration   Location:  Finger  Finger laceration location:  R index finger  Depth:  Cutaneous  Time since incident:  1 hour  Laceration mechanism:  Metal edge  Pain details:     Severity:  Mild  Foreign body present:  No foreign bodies  Relieved by:  Pressure  Tetanus status:  Up to date  Associated symptoms: no fever, no focal weakness, no numbness, no rash, no redness, no swelling and no streaking        Prior to Admission Medications   Prescriptions Last Dose Informant Patient Reported? Taking? Multiple Vitamin (DAILY VALUE MULTIVITAMIN) TABS 8/8/2020 at Unknown time Self Yes Yes   Sig: Take 1 tablet by mouth daily   PROSTAGLANDIN PGE1 INJECTABLE 20 MCG/ML, STANDARD, IJ SOLN 8/8/2020 at Unknown time  No Yes   Sig: Inject 0 3mL to 1 0mL IC 20 minutes prior to intercourse     Potassium Citrate ER 15 MEQ (1620 MG) TBCR  Self Yes No   Sig: Take by mouth   alprostadil (EDEX) 20 MCG injection   Yes No   Sig: by Intracavernosal route   amLODIPine (NORVASC) 10 mg tablet 8/8/2020 at Unknown time Self Yes Yes   Sig: Take by mouth   dronabinol (MARINOL) 5 MG capsule   No No   Sig: TAKE 1 CAPSULE BY MOUTH EVERY DAY   metoprolol succinate (TOPROL-XL) 25 mg 24 hr tablet 8/8/2020 at Unknown time Self Yes Yes   Sig: Take 1 tablet by mouth daily   sodium bicarbonate 650 mg tablet  Self Yes No   Sig: Take by mouth      Facility-Administered Medications: None       Past Medical History:   Diagnosis Date    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     Calculus of kidney     Hyperlipidemia     Hypertension     Malignant neoplasm of ureteric orifice (HCC)     Other male erectile dysfunction        Past Surgical History:   Procedure Laterality Date    CYSTECTOMY W/ URETEROILEAL CONDUIT  2009       Family History   Problem Relation Age of Onset    Aneurysm Mother     Heart failure Father      I have reviewed and agree with the history as documented  E-Cigarette/Vaping    E-Cigarette Use Never User      E-Cigarette/Vaping Substances     Social History     Tobacco Use    Smoking status: Current Every Day Smoker     Packs/day: 0 25     Types: Cigarettes    Smokeless tobacco: Never Used   Substance Use Topics    Alcohol use: No    Drug use: No       Review of Systems   Constitutional: Negative  Negative for chills, fatigue and fever  HENT: Negative  Negative for congestion, rhinorrhea and sore throat  Eyes: Negative  Negative for visual disturbance  Respiratory: Negative  Negative for cough, shortness of breath and wheezing  Cardiovascular: Negative  Negative for chest pain, palpitations and leg swelling  Gastrointestinal: Negative  Negative for abdominal pain, constipation, diarrhea, nausea and vomiting  Genitourinary: Negative  Negative for dysuria, flank pain and hematuria  Musculoskeletal: Negative  Negative for arthralgias, back pain, myalgias and neck pain  Skin: Positive for wound  Negative for rash  Neurological: Negative  Negative for dizziness, focal weakness, light-headedness, numbness and headaches  Psychiatric/Behavioral: Negative    Negative for confusion  All other systems reviewed and are negative  Physical Exam  Physical Exam  Vitals signs and nursing note reviewed  Constitutional:       General: He is not in acute distress  Appearance: Normal appearance  He is well-developed  He is not toxic-appearing or diaphoretic  HENT:      Head: Normocephalic and atraumatic  Right Ear: Hearing, tympanic membrane, ear canal and external ear normal       Left Ear: Hearing, tympanic membrane, ear canal and external ear normal       Nose: Nose normal       Mouth/Throat:      Pharynx: Uvula midline  No oropharyngeal exudate  Eyes:      General: No scleral icterus  Conjunctiva/sclera: Conjunctivae normal       Pupils: Pupils are equal, round, and reactive to light  Neck:      Musculoskeletal: Normal range of motion and neck supple  Vascular: No JVD  Trachea: Trachea normal  No tracheal deviation  Cardiovascular:      Rate and Rhythm: Normal rate and regular rhythm  Pulses: Normal pulses  Heart sounds: Normal heart sounds  No murmur  Pulmonary:      Effort: Pulmonary effort is normal  No respiratory distress  Breath sounds: Normal breath sounds  No wheezing, rhonchi or rales  Abdominal:      General: Bowel sounds are normal       Palpations: Abdomen is soft  Tenderness: There is no abdominal tenderness  There is no guarding or rebound  Hernia: No hernia is present  Musculoskeletal: Normal range of motion  General: No tenderness  Skin:     General: Skin is warm and dry  Capillary Refill: Capillary refill takes less than 2 seconds  Findings: Laceration present  No rash  Nails: There is no clubbing  Comments: Pt has a well approximated flap like laceration of the distal right index finger pad  Total length 3 cm V shaped  Area begins to ooze when pressure removed  Normal ROM of the finger  No evidence of infection  No FB  No nail involvement     Neurological: General: No focal deficit present  Mental Status: He is alert and oriented to person, place, and time  GCS: GCS eye subscore is 4  GCS verbal subscore is 5  GCS motor subscore is 6  Cranial Nerves: Cranial nerves are intact  No cranial nerve deficit  Sensory: Sensation is intact  No sensory deficit  Motor: Motor function is intact  No abnormal muscle tone  Gait: Gait normal       Deep Tendon Reflexes: Reflexes are normal and symmetric  Psychiatric:         Mood and Affect: Mood normal          Speech: Speech normal          Behavior: Behavior normal          Vital Signs  ED Triage Vitals [08/08/20 1220]   Temperature Pulse Respirations Blood Pressure SpO2   98 °F (36 7 °C) 67 18 146/77 96 %      Temp Source Heart Rate Source Patient Position - Orthostatic VS BP Location FiO2 (%)   Temporal Monitor Sitting Left arm --      Pain Score       4           Vitals:    08/08/20 1220 08/08/20 1300   BP: 146/77 145/78   Pulse: 67 82   Patient Position - Orthostatic VS: Sitting Sitting         Visual Acuity      ED Medications  Medications - No data to display    Diagnostic Studies  Results Reviewed     None                 No orders to display              Procedures  Laceration repair    Date/Time: 8/8/2020 12:44 PM  Performed by: Denver Patterson PA-C  Authorized by: Denver Patterson PA-C   Consent: Verbal consent obtained  Risks and benefits: risks, benefits and alternatives were discussed  Consent given by: patient  Patient understanding: patient states understanding of the procedure being performed  Site marked: the operative site was marked  Patient identity confirmed: verbally with patient and arm band  Time out: Immediately prior to procedure a "time out" was called to verify the correct patient, procedure, equipment, support staff and site/side marked as required    Body area: upper extremity  Location details: right index finger  Laceration length: 3 cm  Foreign bodies: no foreign bodies  Tendon involvement: none  Nerve involvement: none      Procedure Details:  Preparation: Patient was prepped and draped in the usual sterile fashion  Irrigation solution: saline  Irrigation method: syringe  Amount of cleaning: standard  Debridement: none  Degree of undermining: none  Skin closure: glue and Steri-Strips  Approximation: close  Approximation difficulty: simple  Dressing: gauze roll (telfa)  Patient tolerance: Patient tolerated the procedure well with no immediate complications  Comments: Area well approximated and hemostasis achieved  ED Course  ED Course as of Aug 08 1414   Sat Aug 08, 2020   1241 Pt declined xray of the area  Upon review of records in [address] [place] [address] Rd, pt's tetanus is UTD and last given on 2/22/19  Discussed treatment options and he does not want sutures  He would prefer for this to be closed with glue or steri strips  I did discuss with pt that there is potential that the glue and steri strips could fall off prematurely as this is located on his finger and due to location/movement/hand washing, etc and wound could potentially re open  Despite discussion, he did not want to have sutures placed and would prefer a noninvasive option  18 Pt declined Rx for Abx as he indicates he has one at home  Verbal consent obtained for repair of the laceration  Pt prepped and draped in usual sterile fashion  See procedure note above  Area was cleansed with betaine and irrigated with saline  Pt tolerated well  Wound approximated and hemostasis achieved  Tetanus was not updated  A non adherent and sterile dressing applied  Reviewed standard wound care  S/sx infection reviewed  OTC meds as needed for pain relief  Strict return precautions outlined  Advised outpatient follow up with PCP for wound recheck or return to ER for change in condition as outlined  Pt verbalized understanding and had no further questions  Pt left in stable, improved condition      US AUDIT Most Recent Value   Initial Alcohol Screen: US AUDIT-C    1  How often do you have a drink containing alcohol?  0 Filed at: 08/08/2020 1221   2  How many drinks containing alcohol do you have on a typical day you are drinking? 0 Filed at: 08/08/2020 1221   3a  Male UNDER 65: How often do you have five or more drinks on one occasion? 0 Filed at: 08/08/2020 1221   Audit-C Score  0 Filed at: 08/08/2020 1221                  YAIMA/DAST-10      Most Recent Value   How many times in the past year have you    Used an illegal drug or used a prescription medication for non-medical reasons? Never Filed at: 08/08/2020 1221                                MDM  Number of Diagnoses or Management Options  Laceration of right index finger without foreign body without damage to nail, initial encounter: new and does not require workup     Amount and/or Complexity of Data Reviewed  Decide to obtain previous medical records or to obtain history from someone other than the patient: yes  Obtain history from someone other than the patient: yes  Review and summarize past medical records: yes    Patient Progress  Patient progress: improved        Disposition  Final diagnoses:   Laceration of right index finger without foreign body without damage to nail, initial encounter     Time reflects when diagnosis was documented in both MDM as applicable and the Disposition within this note     Time User Action Codes Description Comment    8/8/2020  1:05 PM Deneen Suárez Add [M87 [id number]] Laceration of right index finger without foreign body without damage to nail, initial encounter       ED Disposition     ED Disposition Condition Date/Time Comment    Discharge Stable Sat Aug 8, 2020  1:05 PM [address] discharge to home/self care              Follow-up Information     Follow up With Specialties Details Why Contact Info Additional Information    Sridevi Zuñiga, DO Internal Medicine, Family Medicine Schedule an appointment as soon as possible for a visit   [address]  [facility] Emergency Department Emergency Medicine  As needed [address] [facility] ED, [address], [place], [place], [address]          Discharge Medication List as of 8/8/2020  1:07 PM      CONTINUE these medications which have NOT CHANGED    Details   amLODIPine (NORVASC) 10 mg tablet Take by mouth, Starting Fri 6/17/2011, Historical Med      metoprolol succinate (TOPROL-XL) 25 mg 24 hr tablet Take 1 tablet by mouth daily, Historical Med      Multiple Vitamin (DAILY VALUE MULTIVITAMIN) TABS Take 1 tablet by mouth daily, Historical Med      PROSTAGLANDIN PGE1 INJECTABLE 20 MCG/ML, STANDARD, IJ SOLN Inject 0 3mL to 1 0mL IC 20 minutes prior to intercourse , Phone In      alprostadil (EDEX) 20 MCG injection by Intracavernosal route, Starting Mon 6/8/2015, Historical Med      dronabinol (MARINOL) 5 MG capsule TAKE 1 CAPSULE BY MOUTH EVERY DAY, Normal      Potassium Citrate ER 15 MEQ (1620 MG) TBCR Take by mouth, Historical Med      sodium bicarbonate 650 mg tablet Take by mouth, Historical Med           No discharge procedures on file      PDMP Review     None          ED Provider  Electronically Signed by           Tamiko Cox PA-C  08/08/20 8689

## 2020-08-24 ENCOUNTER — TRANSCRIBE ORDERS (OUTPATIENT)
Dept: ADMINISTRATIVE | Facility: HOSPITAL | Age: 55
End: 2020-08-24

## 2020-08-24 ENCOUNTER — APPOINTMENT (OUTPATIENT)
Dept: LAB | Facility: HOSPITAL | Age: 55
End: 2020-08-24
Payer: MEDICARE

## 2020-08-24 DIAGNOSIS — N18.30 CHRONIC KIDNEY DISEASE, STAGE III (MODERATE) (HCC): ICD-10-CM

## 2020-08-24 DIAGNOSIS — N18.30 CHRONIC KIDNEY DISEASE, STAGE III (MODERATE) (HCC): Primary | ICD-10-CM

## 2020-08-24 LAB
ALBUMIN SERPL BCP-MCNC: 3.6 G/DL (ref 3.5–5)
ANION GAP SERPL CALCULATED.3IONS-SCNC: 8 MMOL/L (ref 4–13)
BASOPHILS # BLD AUTO: 0.08 THOUSANDS/ΜL (ref 0–0.1)
BASOPHILS NFR BLD AUTO: 1 % (ref 0–1)
BUN SERPL-MCNC: 39 MG/DL (ref 5–25)
CALCIUM SERPL-MCNC: 8.6 MG/DL (ref 8.3–10.1)
CHLORIDE SERPL-SCNC: 106 MMOL/L (ref 100–108)
CO2 SERPL-SCNC: 22 MMOL/L (ref 21–32)
CREAT SERPL-MCNC: 2.93 MG/DL (ref 0.6–1.3)
EOSINOPHIL # BLD AUTO: 0.38 THOUSAND/ΜL (ref 0–0.61)
EOSINOPHIL NFR BLD AUTO: 4 % (ref 0–6)
ERYTHROCYTE [DISTWIDTH] IN BLOOD BY AUTOMATED COUNT: 13.3 % (ref 11.6–15.1)
GFR SERPL CREATININE-BSD FRML MDRD: 23 ML/MIN/1.73SQ M
GLUCOSE SERPL-MCNC: 112 MG/DL (ref 65–140)
HCT VFR BLD AUTO: 48 % (ref 36.5–49.3)
HGB BLD-MCNC: 15.5 G/DL (ref 12–17)
IMM GRANULOCYTES # BLD AUTO: 0.02 THOUSAND/UL (ref 0–0.2)
IMM GRANULOCYTES NFR BLD AUTO: 0 % (ref 0–2)
LYMPHOCYTES # BLD AUTO: 1.87 THOUSANDS/ΜL (ref 0.6–4.47)
LYMPHOCYTES NFR BLD AUTO: 22 % (ref 14–44)
MCH RBC QN AUTO: 30.3 PG (ref 26.8–34.3)
MCHC RBC AUTO-ENTMCNC: 32.3 G/DL (ref 31.4–37.4)
MCV RBC AUTO: 94 FL (ref 82–98)
MONOCYTES # BLD AUTO: 0.8 THOUSAND/ΜL (ref 0.17–1.22)
MONOCYTES NFR BLD AUTO: 9 % (ref 4–12)
NEUTROPHILS # BLD AUTO: 5.47 THOUSANDS/ΜL (ref 1.85–7.62)
NEUTS SEG NFR BLD AUTO: 64 % (ref 43–75)
NRBC BLD AUTO-RTO: 0 /100 WBCS
PHOSPHATE SERPL-MCNC: 3.8 MG/DL (ref 2.7–4.5)
PLATELET # BLD AUTO: 277 THOUSANDS/UL (ref 149–390)
PMV BLD AUTO: 8.6 FL (ref 8.9–12.7)
POTASSIUM SERPL-SCNC: 4.2 MMOL/L (ref 3.5–5.3)
PTH-INTACT SERPL-MCNC: 93 PG/ML (ref 18.4–80.1)
RBC # BLD AUTO: 5.11 MILLION/UL (ref 3.88–5.62)
SODIUM SERPL-SCNC: 136 MMOL/L (ref 136–145)
URATE SERPL-MCNC: 5.9 MG/DL (ref 4.2–8)
WBC # BLD AUTO: 8.62 THOUSAND/UL (ref 4.31–10.16)

## 2020-08-24 PROCEDURE — 85025 COMPLETE CBC W/AUTO DIFF WBC: CPT

## 2020-08-24 PROCEDURE — 84550 ASSAY OF BLOOD/URIC ACID: CPT

## 2020-08-24 PROCEDURE — 36415 COLL VENOUS BLD VENIPUNCTURE: CPT

## 2020-08-24 PROCEDURE — 80069 RENAL FUNCTION PANEL: CPT

## 2020-08-24 PROCEDURE — 83970 ASSAY OF PARATHORMONE: CPT

## 2020-09-01 ENCOUNTER — TRANSCRIBE ORDERS (OUTPATIENT)
Dept: ADMINISTRATIVE | Facility: HOSPITAL | Age: 55
End: 2020-09-01

## 2020-09-01 DIAGNOSIS — C67.6 MALIGNANT NEOPLASM OF URETERIC ORIFICE (HCC): Primary | ICD-10-CM

## 2020-09-08 ENCOUNTER — HOSPITAL ENCOUNTER (OUTPATIENT)
Dept: RADIOLOGY | Facility: HOSPITAL | Age: 55
Discharge: HOME | End: 2020-09-08
Attending: UROLOGY
Payer: MEDICARE

## 2020-09-08 ENCOUNTER — HOSPITAL ENCOUNTER (OUTPATIENT)
Dept: ULTRASOUND IMAGING | Facility: HOSPITAL | Age: 55
Discharge: HOME | End: 2020-09-08
Attending: UROLOGY
Payer: MEDICARE

## 2020-09-08 DIAGNOSIS — C67.6 MALIGNANT NEOPLASM OF URETERIC ORIFICE (HCC): ICD-10-CM

## 2020-09-08 DIAGNOSIS — N26.1 ATROPHY OF RIGHT KIDNEY: ICD-10-CM

## 2020-09-08 DIAGNOSIS — Z90.6 HISTORY OF TOTAL CYSTECTOMY: ICD-10-CM

## 2020-09-08 PROCEDURE — 76770 US EXAM ABDO BACK WALL COMP: CPT

## 2020-09-08 PROCEDURE — 71046 X-RAY EXAM CHEST 2 VIEWS: CPT

## 2020-09-10 ENCOUNTER — TELEPHONE (OUTPATIENT)
Dept: UROLOGY | Facility: MEDICAL CENTER | Age: 55
End: 2020-09-10

## 2020-09-10 DIAGNOSIS — N28.89 RENAL MASS: Primary | ICD-10-CM

## 2020-09-10 NOTE — TELEPHONE ENCOUNTER
Patient managed by Dr Megan Grady [facility] called in to report significant findings US Kidney and Bladder    Ysabel can be reached at [phone]

## 2020-09-10 NOTE — TELEPHONE ENCOUNTER
Will route to provider for review and further recommendations at this time  IMPRESSION:  1  Mild right-sided hydroureteronephrosis  2   15 mm indeterminate exophytic mass at the upper pole left kidney with questionable internal vascularity on color images  Recommend further characterization with dedicated renal protocol CT or renal MRI    3   Bilateral renal cysts

## 2020-09-11 NOTE — TELEPHONE ENCOUNTER
Please notify pt that I would like for him to have a MRI of ahte abd to further evaulate his kidneys before his appointment with Dr Maynor Jackson scheduled 9/25/2020   Thank you

## 2020-09-11 NOTE — TELEPHONE ENCOUNTER
Called pt and left msg on voicemail on home phone and cell to return call  No comm consent on file  Pt needs to be told to schedule and MRI of abdomen to further evaluate his kidneys before his appt with Dr Evette Lozano scheduled- 9/25  Order in chart    Central Scheduling- [phone]

## 2020-09-11 NOTE — TELEPHONE ENCOUNTER
Spoke to pt and advised per Isaias Washington he should have MRI of abdomen done to further evaluate his kidneys prior to his appt w/Dr Behzad Kennedy on 9/25   Provided number for central schedule

## 2020-09-22 ENCOUNTER — HOSPITAL ENCOUNTER (OUTPATIENT)
Dept: MRI IMAGING | Facility: HOSPITAL | Age: 55
Discharge: HOME | End: 2020-09-22
Payer: MEDICARE

## 2020-09-22 DIAGNOSIS — N28.89 RENAL MASS: ICD-10-CM

## 2020-09-22 PROCEDURE — G1004 CDSM NDSC: HCPCS

## 2020-09-22 PROCEDURE — 74181 MRI ABDOMEN W/O CONTRAST: CPT

## 2020-09-25 ENCOUNTER — OFFICE VISIT (OUTPATIENT)
Dept: UROLOGY | Facility: MEDICAL CENTER | Age: 55
End: 2020-09-25
Payer: MEDICARE

## 2020-09-25 VITALS
HEIGHT: 70 IN | WEIGHT: 167 LBS | TEMPERATURE: 98 F | HEART RATE: 92 BPM | DIASTOLIC BLOOD PRESSURE: 78 MMHG | RESPIRATION RATE: 18 BRPM | SYSTOLIC BLOOD PRESSURE: 142 MMHG | BODY MASS INDEX: 23.91 KG/M2

## 2020-09-25 DIAGNOSIS — Z90.6 HISTORY OF TOTAL CYSTECTOMY: ICD-10-CM

## 2020-09-25 DIAGNOSIS — N28.89 RENAL MASS: ICD-10-CM

## 2020-09-25 DIAGNOSIS — C67.6 MALIGNANT NEOPLASM OF URETERIC ORIFICE (HCC): Primary | ICD-10-CM

## 2020-09-25 DIAGNOSIS — N52.8 OTHER MALE ERECTILE DYSFUNCTION: ICD-10-CM

## 2020-09-25 DIAGNOSIS — N26.1 ATROPHY OF RIGHT KIDNEY: ICD-10-CM

## 2020-09-25 PROCEDURE — 99215 OFFICE O/P EST HI 40 MIN: CPT | Performed by: UROLOGY

## 2020-09-25 NOTE — PROGRESS NOTES
Assessment/Plan:      Diagnoses and all orders for this visit:    Malignant neoplasm of ureteric orifice ([address] )    Other male erectile dysfunction  -     alprostadil (EDEX) 20 MCG injection; 20 mcg by Intracavitary route as needed for erectile dysfunction    History of total cystectomy    Atrophy of right kidney    Renal mass          Subjective:  No complaints     Patient ID: Monique Rose is a 62 y o  male  HPI  He is here for follow-up of his bladder cancer treatment  Adry Stroud had undergone a radical cystectomy with ileal conduit urinary diversion 11 years ago, which was followed by chemotherapy, 10 years ago  Adry Stroud has done well since, in terms of his disease free status   His stoma and appliance appliance are working well, and he is not having any issues with it  Adry Stroud has known atrophy of his right kidney  Adry Stroud denies any pains, weight loss, or hematuria  Adry Stroud has a good appetite and bowel function      His recent renal ultrasound suggested possibility a mass in the left kidney, so a follow-up MRI was obtained which showed the lesion to be less suspicious and stable  Concerning his ED he has been using penile injection therapy with Edex      Review of Systems   Constitutional: Negative  HENT: Negative  Eyes: Negative  Respiratory: Negative  Cardiovascular: Negative  Gastrointestinal: Negative  Negative for abdominal pain  Endocrine: Negative  Genitourinary: Negative  Negative for flank pain  Musculoskeletal: Negative  Skin: Negative  Allergic/Immunologic: Negative  Neurological: Negative  Hematological: Negative  Psychiatric/Behavioral: Negative  Objective:     Physical Exam    Constitutional: He is oriented to person, place, and time  He appears well-developed and well-nourished  No distress  HENT:   Head: Normocephalic and atraumatic  Nose: Nose normal    Mouth/Throat: Oropharynx is clear and moist    Eyes: Pupils are equal, round, and reactive to light  Conjunctivae and EOM are normal  No scleral icterus  Neck: Normal range of motion  Neck supple  Cardiovascular: Normal rate, regular rhythm, normal heart sounds and intact distal pulses  No murmur heard  Pulmonary/Chest: Effort normal and breath sounds normal  No respiratory distress  He has no wheezes  He has no rales  Abdominal: Soft  Bowel sounds are normal  He exhibits no distension and no mass  There is no tenderness  Ileal stoma intact, appliance fits well  Musculoskeletal: Normal range of motion  He exhibits no edema or tenderness  Lymphadenopathy:     He has no cervical adenopathy  Neurological: He is alert and oriented to person, place, and time  No cranial nerve deficit  Skin: Skin is warm and dry  No rash noted  No erythema  No pallor  Psychiatric: He has a normal mood and affect  His behavior is normal  Judgment and thought content normal    Nursing note and vitals reviewed  Abdominal /renal MRI from 09/22/2020:  IMPRESSION:  1  Focal parenchymal fullness at the posterolateral left renal upper pole on the order of 3 1 cm in size with mildly increased T2 signal relative to the rest the parenchyma  No suspicious imaging correlate on recent ultrasound  Masslike contour is at least partially accentuated by adjacent scarring  Contours and size appear similar to 2014 when accounting for differences in modality  Stability is a reassuring feature  Recommend MRI follow-up in 6-12 months  2   The correlate to the recent ultrasound finding appears to be a 13 mm lobulation with normal intraluminal signal flanked by cortical scarring--unchanged since 2014 when accounting for differences in modality  3   16 mm lobular T2 hyperintense liver mass has noncontrast imaging features suggestive of hemangioma, though confirmation would require contrast administration  The finding is slightly larger than on priors  This can also be followed on surveillance   imaging, recommended above    4  Unchanged atrophy of the right kidney  5   Postsurgical changes after urinary diversion

## 2021-03-03 ENCOUNTER — TRANSCRIBE ORDERS (OUTPATIENT)
Dept: ADMINISTRATIVE | Facility: HOSPITAL | Age: 56
End: 2021-03-03

## 2021-03-03 ENCOUNTER — LAB (OUTPATIENT)
Dept: LAB | Facility: HOSPITAL | Age: 56
End: 2021-03-03
Payer: MEDICARE

## 2021-03-03 DIAGNOSIS — N18.4 CHRONIC KIDNEY DISEASE, STAGE IV (SEVERE) (HCC): Primary | ICD-10-CM

## 2021-03-03 DIAGNOSIS — N18.4 CHRONIC KIDNEY DISEASE, STAGE IV (SEVERE) (HCC): ICD-10-CM

## 2021-03-03 LAB
ALBUMIN SERPL BCP-MCNC: 3.4 G/DL (ref 3.5–5)
ANION GAP SERPL CALCULATED.3IONS-SCNC: 8 MMOL/L (ref 4–13)
BASOPHILS # BLD AUTO: 0.09 THOUSANDS/ΜL (ref 0–0.1)
BASOPHILS NFR BLD AUTO: 1 % (ref 0–1)
BUN SERPL-MCNC: 43 MG/DL (ref 5–25)
CALCIUM ALBUM COR SERPL-MCNC: 9.4 MG/DL (ref 8.3–10.1)
CALCIUM SERPL-MCNC: 8.9 MG/DL (ref 8.3–10.1)
CHLORIDE SERPL-SCNC: 109 MMOL/L (ref 100–108)
CO2 SERPL-SCNC: 23 MMOL/L (ref 21–32)
CREAT SERPL-MCNC: 2.94 MG/DL (ref 0.6–1.3)
EOSINOPHIL # BLD AUTO: 0.49 THOUSAND/ΜL (ref 0–0.61)
EOSINOPHIL NFR BLD AUTO: 5 % (ref 0–6)
ERYTHROCYTE [DISTWIDTH] IN BLOOD BY AUTOMATED COUNT: 13.2 % (ref 11.6–15.1)
GFR SERPL CREATININE-BSD FRML MDRD: 23 ML/MIN/1.73SQ M
GLUCOSE SERPL-MCNC: 103 MG/DL (ref 65–140)
HCT VFR BLD AUTO: 46 % (ref 36.5–49.3)
HGB BLD-MCNC: 14.9 G/DL (ref 12–17)
IMM GRANULOCYTES # BLD AUTO: 0.03 THOUSAND/UL (ref 0–0.2)
IMM GRANULOCYTES NFR BLD AUTO: 0 % (ref 0–2)
LYMPHOCYTES # BLD AUTO: 2.79 THOUSANDS/ΜL (ref 0.6–4.47)
LYMPHOCYTES NFR BLD AUTO: 29 % (ref 14–44)
MCH RBC QN AUTO: 30.5 PG (ref 26.8–34.3)
MCHC RBC AUTO-ENTMCNC: 32.4 G/DL (ref 31.4–37.4)
MCV RBC AUTO: 94 FL (ref 82–98)
MONOCYTES # BLD AUTO: 0.89 THOUSAND/ΜL (ref 0.17–1.22)
MONOCYTES NFR BLD AUTO: 9 % (ref 4–12)
NEUTROPHILS # BLD AUTO: 5.21 THOUSANDS/ΜL (ref 1.85–7.62)
NEUTS SEG NFR BLD AUTO: 56 % (ref 43–75)
NRBC BLD AUTO-RTO: 0 /100 WBCS
PHOSPHATE SERPL-MCNC: 2.4 MG/DL (ref 2.7–4.5)
PLATELET # BLD AUTO: 290 THOUSANDS/UL (ref 149–390)
PMV BLD AUTO: 8.7 FL (ref 8.9–12.7)
POTASSIUM SERPL-SCNC: 3.6 MMOL/L (ref 3.5–5.3)
RBC # BLD AUTO: 4.89 MILLION/UL (ref 3.88–5.62)
SODIUM SERPL-SCNC: 140 MMOL/L (ref 136–145)
URATE SERPL-MCNC: 5.5 MG/DL (ref 4.2–8)
WBC # BLD AUTO: 9.5 THOUSAND/UL (ref 4.31–10.16)

## 2021-03-03 PROCEDURE — 85025 COMPLETE CBC W/AUTO DIFF WBC: CPT

## 2021-03-03 PROCEDURE — 84550 ASSAY OF BLOOD/URIC ACID: CPT

## 2021-03-03 PROCEDURE — 83970 ASSAY OF PARATHORMONE: CPT

## 2021-03-03 PROCEDURE — 36415 COLL VENOUS BLD VENIPUNCTURE: CPT

## 2021-03-03 PROCEDURE — 82570 ASSAY OF URINE CREATININE: CPT | Performed by: INTERNAL MEDICINE

## 2021-03-03 PROCEDURE — 84156 ASSAY OF PROTEIN URINE: CPT | Performed by: INTERNAL MEDICINE

## 2021-03-03 PROCEDURE — 80069 RENAL FUNCTION PANEL: CPT

## 2021-03-04 LAB
CREAT UR-MCNC: 62.5 MG/DL
PROT UR-MCNC: 45 MG/DL
PROT/CREAT UR: 0.72 MG/G{CREAT} (ref 0–0.1)
PTH-INTACT SERPL-MCNC: 83.7 PG/ML (ref 18.4–80.1)

## 2021-07-29 DIAGNOSIS — L02.416 CELLULITIS AND ABSCESS OF LEFT LEG: Primary | ICD-10-CM

## 2021-07-29 DIAGNOSIS — L03.116 CELLULITIS AND ABSCESS OF LEFT LEG: Primary | ICD-10-CM

## 2021-07-29 RX ORDER — CEPHALEXIN 500 MG/1
500 CAPSULE ORAL EVERY 8 HOURS SCHEDULED
Qty: 14 CAPSULE | Refills: 0 | Status: SHIPPED | OUTPATIENT
Start: 2021-07-29 | End: 2021-08-03

## 2021-08-23 ENCOUNTER — OFFICE VISIT (OUTPATIENT)
Dept: FAMILY MEDICINE CLINIC | Facility: CLINIC | Age: 56
End: 2021-08-23
Payer: MEDICARE

## 2021-08-23 VITALS
RESPIRATION RATE: 20 BRPM | BODY MASS INDEX: 25.33 KG/M2 | WEIGHT: 171 LBS | HEIGHT: 69 IN | TEMPERATURE: 97.5 F | SYSTOLIC BLOOD PRESSURE: 132 MMHG | DIASTOLIC BLOOD PRESSURE: 68 MMHG | HEART RATE: 84 BPM

## 2021-08-23 DIAGNOSIS — I15.9 SECONDARY HYPERTENSION: Primary | ICD-10-CM

## 2021-08-23 DIAGNOSIS — N18.4 CHRONIC KIDNEY DISEASE, STAGE 4 (SEVERE) (HCC): ICD-10-CM

## 2021-08-23 DIAGNOSIS — Z90.6 ACQUIRED ABSENCE OF OTHER PARTS OF URINARY TRACT: ICD-10-CM

## 2021-08-23 DIAGNOSIS — Z98.890 HISTORY OF OVARIAN CYSTECTOMY: ICD-10-CM

## 2021-08-23 DIAGNOSIS — C67.9 MALIGNANT NEOPLASM OF URINARY BLADDER, UNSPECIFIED SITE (HCC): ICD-10-CM

## 2021-08-23 DIAGNOSIS — Z87.42 HISTORY OF OVARIAN CYSTECTOMY: ICD-10-CM

## 2021-08-23 PROCEDURE — 99204 OFFICE O/P NEW MOD 45 MIN: CPT | Performed by: FAMILY MEDICINE

## 2021-08-23 RX ORDER — AMLODIPINE BESYLATE 10 MG/1
10 TABLET ORAL DAILY
Qty: 90 TABLET | Refills: 1 | Status: SHIPPED | OUTPATIENT
Start: 2021-08-23 | End: 2022-02-28

## 2021-08-23 RX ORDER — METOPROLOL SUCCINATE 25 MG/1
25 TABLET, EXTENDED RELEASE ORAL DAILY
Qty: 90 TABLET | Refills: 1 | Status: SHIPPED | OUTPATIENT
Start: 2021-08-23 | End: 2022-02-28

## 2021-08-23 NOTE — PROGRESS NOTES
Assessment/Plan:Hypertension with blood pressure controlled on current    History of bladder cancer post cystectomy with ureteral conduit    Hyperlipidemia with laboratory pending    Problem List Items Addressed This Visit     None      Visit Diagnoses     Secondary hypertension    -  Primary    Malignant neoplasm of urinary bladder, unspecified site ([facility] )        History of ovarian cystectomy        Chronic kidney disease, stage 4 (severe) ([facility] )               Diagnoses and all orders for this visit:    Secondary hypertension    Malignant neoplasm of urinary bladder, unspecified site [facility])    History of ovarian cystectomy    Chronic kidney disease, stage 4 (severe) ([facility] )    Other orders  -     amLODIPine (NORVASC) 10 mg tablet; Take 1 tablet (10 mg total) by mouth daily  -     metoprolol succinate (TOPROL-XL) 25 mg 24 hr tablet; Take 1 tablet (25 mg total) by mouth daily        No problem-specific Assessment & Plan notes found for this encounter  PHQ-9 Depression Screening    PHQ-9:   Frequency of the following problems over the past two weeks:      Little interest or pleasure in doing things: 0 - not at all  Feeling down, depressed, or hopeless: 0 - not at all  PHQ-2 Score: 0          Body mass index is 25 25 kg/m²  BMI Counseling: Body mass index is 25 25 kg/m²  The BMI     Subjective:      Patient ID: Charles Hernandez is a 60 y o  male  Patient presents to [facility] care      The following portions of the patient's history were reviewed and updated as appropriate:   He has a past medical history of 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, Calculus of kidney, Hyperlipidemia, Hypertension, Malignant neoplasm of ureteric orifice ([facility] ), and Other male erectile dysfunction  ,  does not have a problem list on file  ,   has a past surgical history that includes Cystectomy w/ ureteroileal conduit (2009)  ,  family history includes Aneurysm in his mother; Heart failure in his father  ,   reports that he has been smoking cigarettes  He has been smoking about 0 25 packs per day  He has never used smokeless tobacco  He reports that he does not drink alcohol and does not use drugs  ,  has No Known Allergies     Current Outpatient Medications   Medication Sig Dispense Refill    alprostadil (EDEX) 20 MCG injection 20 mcg by Intracavitary route as needed for erectile dysfunction 5 vial 6    amLODIPine (NORVASC) 10 mg tablet Take by mouth      dronabinol (MARINOL) 5 MG capsule TAKE 1 CAPSULE BY MOUTH EVERY DAY 90 capsule 0    metoprolol succinate (TOPROL-XL) 25 mg 24 hr tablet Take 1 tablet by mouth daily      Multiple Vitamin (DAILY VALUE MULTIVITAMIN) TABS Take 1 tablet by mouth daily      Potassium Citrate ER 15 MEQ (1620 MG) TBCR Take by mouth      sodium bicarbonate 650 mg tablet Take by mouth      PROSTAGLANDIN PGE1 INJECTABLE 20 MCG/ML, STANDARD, IJ SOLN Inject 0 3mL to 1 0mL IC 20 minutes prior to intercourse  (Patient not taking: Reported on 9/25/2020) 10 mL 0     No current facility-administered medications for this visit  Review of Systems   Constitutional: Negative for chills and fever  HENT: Negative for ear pain and sore throat  Eyes: Negative for pain and visual disturbance  Respiratory: Negative for cough and shortness of breath  Cardiovascular: Negative for chest pain and palpitations  Gastrointestinal: Negative for abdominal pain and vomiting  Genitourinary: Negative for dysuria and hematuria  History of cystectomy with ureteral conduit   Musculoskeletal: Negative for arthralgias and back pain  Skin: Negative for color change and rash  Neurological: Negative for seizures and syncope  All other systems reviewed and are negative  Objective:    /68   Pulse 84   Temp 97 5 °F (36 4 °C)   Resp 20   Ht 5' 9" (1 753 m)   Wt 77 6 kg (171 lb)   BMI 25 25 kg/m²   Body mass index is 25 25 kg/m²       Physical Exam  Constitutional:       Appearance: He is well-developed  HENT:      Head: Normocephalic  Eyes:      Pupils: Pupils are equal, round, and reactive to light  Cardiovascular:      Rate and Rhythm: Normal rate and regular rhythm  Heart sounds: Normal heart sounds  Pulmonary:      Effort: Pulmonary effort is normal       Breath sounds: Normal breath sounds  Abdominal:      General: Bowel sounds are normal       Palpations: Abdomen is soft  Tenderness: There is no abdominal tenderness  Musculoskeletal:      Cervical back: Normal range of motion  Skin:     General: Skin is warm  Neurological:      Mental Status: He is alert and oriented to person, place, and time

## 2021-08-24 ENCOUNTER — TELEPHONE (OUTPATIENT)
Dept: FAMILY MEDICINE CLINIC | Facility: CLINIC | Age: 56
End: 2021-08-24

## 2021-08-24 NOTE — TELEPHONE ENCOUNTER
Updated patient's PCP information - did just receive 12 boxes of pouches - good for 3 months - will send RX via fax when patient is due again

## 2022-01-19 ENCOUNTER — TELEPHONE (OUTPATIENT)
Dept: OTHER | Facility: OTHER | Age: 57
End: 2022-01-19

## 2022-01-19 NOTE — TELEPHONE ENCOUNTER
Pt called in to cancel his appt for 1/19/2022  There was no appt for this date  Instead he missed his appt on 1/18/2022

## 2022-01-31 ENCOUNTER — HOSPITAL ENCOUNTER (OUTPATIENT)
Dept: RADIOLOGY | Facility: HOSPITAL | Age: 57
Discharge: HOME | End: 2022-01-31
Attending: FAMILY MEDICINE
Payer: MEDICARE

## 2022-01-31 ENCOUNTER — OFFICE VISIT (OUTPATIENT)
Dept: FAMILY MEDICINE CLINIC | Facility: CLINIC | Age: 57
End: 2022-01-31
Payer: MEDICARE

## 2022-01-31 ENCOUNTER — APPOINTMENT (OUTPATIENT)
Dept: LAB | Facility: HOSPITAL | Age: 57
End: 2022-01-31
Attending: FAMILY MEDICINE
Payer: MEDICARE

## 2022-01-31 VITALS
WEIGHT: 171 LBS | HEIGHT: 69 IN | TEMPERATURE: 97.2 F | SYSTOLIC BLOOD PRESSURE: 124 MMHG | RESPIRATION RATE: 20 BRPM | BODY MASS INDEX: 25.33 KG/M2 | HEART RATE: 84 BPM | DIASTOLIC BLOOD PRESSURE: 80 MMHG

## 2022-01-31 DIAGNOSIS — E55.9 VITAMIN D DEFICIENCY: ICD-10-CM

## 2022-01-31 DIAGNOSIS — C67.9 MALIGNANT NEOPLASM OF URINARY BLADDER, UNSPECIFIED SITE (HCC): ICD-10-CM

## 2022-01-31 DIAGNOSIS — M25.50 POLYARTHRALGIA: Primary | ICD-10-CM

## 2022-01-31 DIAGNOSIS — I15.9 SECONDARY HYPERTENSION: ICD-10-CM

## 2022-01-31 DIAGNOSIS — M25.50 POLYARTHRALGIA: ICD-10-CM

## 2022-01-31 DIAGNOSIS — N18.4 CHRONIC KIDNEY DISEASE, STAGE 4 (SEVERE) (HCC): ICD-10-CM

## 2022-01-31 LAB
ALBUMIN SERPL BCP-MCNC: 3.4 G/DL (ref 3.5–5)
ALP SERPL-CCNC: 105 U/L (ref 46–116)
ALT SERPL W P-5'-P-CCNC: 20 U/L (ref 12–78)
ANION GAP SERPL CALCULATED.3IONS-SCNC: 12 MMOL/L (ref 4–13)
AST SERPL W P-5'-P-CCNC: 15 U/L (ref 5–45)
BASOPHILS # BLD AUTO: 0.1 THOUSANDS/ΜL (ref 0–0.1)
BASOPHILS NFR BLD AUTO: 1 % (ref 0–1)
BILIRUB SERPL-MCNC: 0.29 MG/DL (ref 0.2–1)
BUN SERPL-MCNC: 31 MG/DL (ref 5–25)
CALCIUM ALBUM COR SERPL-MCNC: 10 MG/DL (ref 8.3–10.1)
CALCIUM SERPL-MCNC: 9.5 MG/DL (ref 8.3–10.1)
CHLORIDE SERPL-SCNC: 102 MMOL/L (ref 100–108)
CO2 SERPL-SCNC: 22 MMOL/L (ref 21–32)
CREAT SERPL-MCNC: 2.89 MG/DL (ref 0.6–1.3)
CRP SERPL QL: 76.3 MG/L
EOSINOPHIL # BLD AUTO: 0.39 THOUSAND/ΜL (ref 0–0.61)
EOSINOPHIL NFR BLD AUTO: 4 % (ref 0–6)
ERYTHROCYTE [DISTWIDTH] IN BLOOD BY AUTOMATED COUNT: 13.4 % (ref 11.6–15.1)
GFR SERPL CREATININE-BSD FRML MDRD: 22 ML/MIN/1.73SQ M
GLUCOSE SERPL-MCNC: 115 MG/DL (ref 65–140)
HCT VFR BLD AUTO: 51.2 % (ref 36.5–49.3)
HGB BLD-MCNC: 16.7 G/DL (ref 12–17)
IMM GRANULOCYTES # BLD AUTO: 0.05 THOUSAND/UL (ref 0–0.2)
IMM GRANULOCYTES NFR BLD AUTO: 1 % (ref 0–2)
LYMPHOCYTES # BLD AUTO: 2.48 THOUSANDS/ΜL (ref 0.6–4.47)
LYMPHOCYTES NFR BLD AUTO: 23 % (ref 14–44)
MCH RBC QN AUTO: 29.2 PG (ref 26.8–34.3)
MCHC RBC AUTO-ENTMCNC: 32.6 G/DL (ref 31.4–37.4)
MCV RBC AUTO: 90 FL (ref 82–98)
MONOCYTES # BLD AUTO: 1.09 THOUSAND/ΜL (ref 0.17–1.22)
MONOCYTES NFR BLD AUTO: 10 % (ref 4–12)
NEUTROPHILS # BLD AUTO: 6.88 THOUSANDS/ΜL (ref 1.85–7.62)
NEUTS SEG NFR BLD AUTO: 61 % (ref 43–75)
NRBC BLD AUTO-RTO: 0 /100 WBCS
PLATELET # BLD AUTO: 422 THOUSANDS/UL (ref 149–390)
PMV BLD AUTO: 8.6 FL (ref 8.9–12.7)
POTASSIUM SERPL-SCNC: 3.8 MMOL/L (ref 3.5–5.3)
PROT SERPL-MCNC: 8.6 G/DL (ref 6.4–8.2)
RBC # BLD AUTO: 5.72 MILLION/UL (ref 3.88–5.62)
SODIUM SERPL-SCNC: 136 MMOL/L (ref 136–145)
WBC # BLD AUTO: 10.99 THOUSAND/UL (ref 4.31–10.16)

## 2022-01-31 PROCEDURE — 85025 COMPLETE CBC W/AUTO DIFF WBC: CPT

## 2022-01-31 PROCEDURE — 36415 COLL VENOUS BLD VENIPUNCTURE: CPT

## 2022-01-31 PROCEDURE — 72040 X-RAY EXAM NECK SPINE 2-3 VW: CPT

## 2022-01-31 PROCEDURE — 86140 C-REACTIVE PROTEIN: CPT

## 2022-01-31 PROCEDURE — 99213 OFFICE O/P EST LOW 20 MIN: CPT | Performed by: FAMILY MEDICINE

## 2022-01-31 PROCEDURE — 86618 LYME DISEASE ANTIBODY: CPT

## 2022-01-31 PROCEDURE — 80053 COMPREHEN METABOLIC PANEL: CPT

## 2022-01-31 PROCEDURE — 82306 VITAMIN D 25 HYDROXY: CPT

## 2022-01-31 PROCEDURE — 73030 X-RAY EXAM OF SHOULDER: CPT

## 2022-01-31 RX ORDER — METHYLPREDNISOLONE 4 MG/1
TABLET ORAL
Qty: 21 EACH | Refills: 0 | Status: SHIPPED | OUTPATIENT
Start: 2022-01-31 | End: 2022-05-18

## 2022-01-31 NOTE — PROGRESS NOTES
Assessment/Plan:  Bilateral shoulder pain with left greater than right with associated bilateral hip pain  Will x-ray the C-spine in the left shoulder and perform laboratory for Lyme disease inflammatory disease  Will empirically treat with a Medrol Dosepak pending lab results    Hypertensive cardiovascular disease with blood pressure controlled on current regimen    History of malignant neoplasm of the urinary bladder    Chronic kidney disease stage 4        Problem List Items Addressed This Visit     None           There are no diagnoses linked to this encounter  No problem-specific Assessment & Plan notes found for this encounter  PHQ-2/9 Depression Screening            Body mass index is 25 25 kg/m²  BMI Counseling: Body mass index is 25 25 kg/m²  The BMI     Subjective:      Patient ID: Kaylan Burt is a 60 y o  male  Patient complains of bilateral arm pain left worse than the right and bilateral shoulder pain on and off for at least 1 month also complains of bilateral hip pain on and off times several weeks  Also complains of fields needing fatigue      The following portions of the patient's history were reviewed and updated as appropriate:   He has a past medical history of 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, Calculus of kidney, Hyperlipidemia, Hypertension, Malignant neoplasm of ureteric orifice ([address] ), and Other male erectile dysfunction  ,  does not have a problem list on file  ,   has a past surgical history that includes Cystectomy w/ ureteroileal conduit (2009)  ,  family history includes Aneurysm in his mother; Heart failure in his father  ,   reports that he has been smoking cigarettes  He has been smoking about 0 50 packs per day  He has never used smokeless tobacco  He reports that he does not drink alcohol and does not use drugs  ,  has No Known Allergies     Current Outpatient Medications   Medication Sig Dispense Refill    alprostadil (EDEX) 20 MCG injection 20 mcg by Intracavitary route as needed for erectile dysfunction 5 vial 6    amLODIPine (NORVASC) 10 mg tablet Take 1 tablet (10 mg total) by mouth daily 90 tablet 1    dronabinol (MARINOL) 5 MG capsule TAKE 1 CAPSULE BY MOUTH EVERY DAY 90 capsule 0    metoprolol succinate (TOPROL-XL) 25 mg 24 hr tablet Take 1 tablet (25 mg total) by mouth daily 90 tablet 1    Multiple Vitamin (DAILY VALUE MULTIVITAMIN) TABS Take 1 tablet by mouth daily      Potassium Citrate ER 15 MEQ (1620 MG) TBCR Take by mouth      PROSTAGLANDIN PGE1 INJECTABLE 20 MCG/ML, STANDARD, IJ SOLN Inject 0 3mL to 1 0mL IC 20 minutes prior to intercourse  (Patient not taking: Reported on 9/25/2020) 10 mL 0    sodium bicarbonate 650 mg tablet Take by mouth       No current facility-administered medications for this visit  Review of Systems   Constitutional: Negative for chills and fever  HENT: Negative for ear pain and sore throat  Eyes: Negative for pain and visual disturbance  Respiratory: Negative for cough and shortness of breath  Cardiovascular: Negative for chest pain and palpitations  Gastrointestinal: Negative for abdominal pain and vomiting  Genitourinary: Negative for dysuria and hematuria  Musculoskeletal: Positive for arthralgias and myalgias  Negative for back pain  Left greater than right shoulder pain at times severe bilateral hip pain   Skin: Negative for color change and rash  Neurological: Negative for seizures and syncope  All other systems reviewed and are negative  Objective:    /80   Pulse 84   Temp (!) 97 2 °F (36 2 °C)   Resp 20   Ht 5' 9" (1 753 m)   Wt 77 6 kg (171 lb)   BMI 25 25 kg/m²   Body mass index is 25 25 kg/m²  Physical Exam  Constitutional:       Appearance: He is well-developed  HENT:      Head: Normocephalic  Eyes:      Pupils: Pupils are equal, round, and reactive to light  Cardiovascular:      Rate and Rhythm: Normal rate and regular rhythm        Heart sounds: Normal heart sounds  Pulmonary:      Effort: Pulmonary effort is normal       Breath sounds: Normal breath sounds  Abdominal:      General: Bowel sounds are normal       Palpations: Abdomen is soft  Tenderness: There is no abdominal tenderness  Musculoskeletal:      Cervical back: Normal range of motion  Comments: Pain on deep palpation of the biceps tendon of the left shoulder   Skin:     General: Skin is warm  Neurological:      Mental Status: He is alert and oriented to person, place, and time

## 2022-02-01 LAB
25(OH)D3 SERPL-MCNC: 24.4 NG/ML (ref 30–100)
B BURGDOR IGG+IGM SER-ACNC: 15

## 2022-02-14 ENCOUNTER — TELEPHONE (OUTPATIENT)
Dept: FAMILY MEDICINE CLINIC | Facility: CLINIC | Age: 57
End: 2022-02-14

## 2022-02-15 ENCOUNTER — TELEPHONE (OUTPATIENT)
Dept: FAMILY MEDICINE CLINIC | Facility: CLINIC | Age: 57
End: 2022-02-15

## 2022-02-26 DIAGNOSIS — I15.9 SECONDARY HYPERTENSION: ICD-10-CM

## 2022-02-28 ENCOUNTER — OFFICE VISIT (OUTPATIENT)
Dept: FAMILY MEDICINE CLINIC | Facility: CLINIC | Age: 57
End: 2022-02-28
Payer: MEDICARE

## 2022-02-28 VITALS
BODY MASS INDEX: 25.48 KG/M2 | HEART RATE: 68 BPM | HEIGHT: 69 IN | SYSTOLIC BLOOD PRESSURE: 126 MMHG | TEMPERATURE: 97.4 F | WEIGHT: 172 LBS | RESPIRATION RATE: 16 BRPM | DIASTOLIC BLOOD PRESSURE: 74 MMHG

## 2022-02-28 DIAGNOSIS — N18.4 CHRONIC KIDNEY DISEASE, STAGE 4 (SEVERE) (HCC): ICD-10-CM

## 2022-02-28 DIAGNOSIS — Z90.6 ACQUIRED ABSENCE OF OTHER PARTS OF URINARY TRACT: ICD-10-CM

## 2022-02-28 DIAGNOSIS — M25.50 POLYARTHRALGIA: Primary | ICD-10-CM

## 2022-02-28 DIAGNOSIS — C67.9 MALIGNANT NEOPLASM OF URINARY BLADDER, UNSPECIFIED SITE (HCC): ICD-10-CM

## 2022-02-28 PROCEDURE — 99213 OFFICE O/P EST LOW 20 MIN: CPT | Performed by: FAMILY MEDICINE

## 2022-02-28 RX ORDER — METOPROLOL SUCCINATE 25 MG/1
TABLET, EXTENDED RELEASE ORAL
Qty: 30 TABLET | Refills: 0 | Status: SHIPPED | OUTPATIENT
Start: 2022-02-28 | End: 2022-02-28

## 2022-02-28 RX ORDER — AMLODIPINE BESYLATE 10 MG/1
TABLET ORAL
Qty: 30 TABLET | Refills: 0 | Status: SHIPPED | OUTPATIENT
Start: 2022-02-28 | End: 2022-02-28

## 2022-02-28 RX ORDER — AMLODIPINE BESYLATE 10 MG/1
10 TABLET ORAL DAILY
Qty: 90 TABLET | Refills: 1 | Status: SHIPPED | OUTPATIENT
Start: 2022-02-28 | End: 2022-06-18

## 2022-02-28 RX ORDER — METOPROLOL SUCCINATE 25 MG/1
25 TABLET, EXTENDED RELEASE ORAL DAILY
Qty: 90 TABLET | Refills: 1 | Status: SHIPPED | OUTPATIENT
Start: 2022-02-28 | End: 2022-06-18

## 2022-02-28 NOTE — PROGRESS NOTES
Assessment/Plan:  Elevated C reactive protein at 76 on prednisone as an anti-inflammatory  Further lab has been ordered for rheumatoid arthritis  Nonsteroidal anti-inflammatories are contraindicated due to him having stage 4 chronic kidney disease  Tobacco use disorder the patient states that he has cut way back and his tobacco usage    Hypertensive cardiovascular disease with blood pressure controlled on the current regimen  Problem List Items Addressed This Visit     None           There are no diagnoses linked to this encounter  No problem-specific Assessment & Plan notes found for this encounter  PHQ-2/9 Depression Screening            Body mass index is 25 4 kg/m²  BMI Counseling: Body mass index is 25 4 kg/m²  The BMI     Subjective:      Patient ID: Zechariah Gibson is a 60 y o  male  Patient presents for checkup on his polyarthralgia      The following portions of the patient's history were reviewed and updated as appropriate:   He has a past medical history of 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, Calculus of kidney, Hyperlipidemia, Hypertension, Malignant neoplasm of ureteric orifice ([address] ), and Other male erectile dysfunction  ,  does not have a problem list on file  ,   has a past surgical history that includes Cystectomy w/ ureteroileal conduit (2009)  ,  family history includes Aneurysm in his mother; Heart failure in his father  ,   reports that he has been smoking cigarettes  He has been smoking about 0 50 packs per day  He has never used smokeless tobacco  He reports that he does not drink alcohol and does not use drugs  ,  has No Known Allergies     Current Outpatient Medications   Medication Sig Dispense Refill    alprostadil (EDEX) 20 MCG injection 20 mcg by Intracavitary route as needed for erectile dysfunction 5 vial 6    amLODIPine (NORVASC) 10 mg tablet Take 1 tablet (10 mg total) by mouth daily 90 tablet 1    dronabinol (MARINOL) 5 MG capsule TAKE 1 CAPSULE BY MOUTH EVERY DAY 90 capsule 0    methylPREDNISolone 4 MG tablet therapy pack Use as directed on package 21 each 0    metoprolol succinate (TOPROL-XL) 25 mg 24 hr tablet Take 1 tablet (25 mg total) by mouth daily 90 tablet 1    Multiple Vitamin (DAILY VALUE MULTIVITAMIN) TABS Take 1 tablet by mouth daily      Potassium Citrate ER 15 MEQ (1620 MG) TBCR Take by mouth      predniSONE 10 mg tablet Take 1 tablet (10 mg total) by mouth daily 30 tablet 0    PROSTAGLANDIN PGE1 INJECTABLE 20 MCG/ML, STANDARD, IJ SOLN Inject 0 3mL to 1 0mL IC 20 minutes prior to intercourse  (Patient not taking: Reported on 9/25/2020) 10 mL 0    sodium bicarbonate 650 mg tablet Take by mouth       No current facility-administered medications for this visit  Review of Systems   Constitutional: Negative for chills and fever  HENT: Negative for ear pain and sore throat  Eyes: Negative for pain and visual disturbance  Respiratory: Negative for cough and shortness of breath  Cardiovascular: Negative for chest pain and palpitations  Gastrointestinal: Negative for abdominal pain and vomiting  Genitourinary: Negative for dysuria and hematuria  Musculoskeletal: Negative for arthralgias and back pain  Polyarthralgias pretty much resolved continuing to take the prednisone   Skin: Negative for color change and rash  Neurological: Negative for seizures and syncope  All other systems reviewed and are negative  Objective:    /74   Pulse 68   Temp (!) 97 4 °F (36 3 °C)   Resp 16   Ht 5' 9" (1 753 m)   Wt 78 kg (172 lb)   BMI 25 40 kg/m²   Body mass index is 25 4 kg/m²  Physical Exam  Constitutional:       Appearance: He is well-developed  HENT:      Head: Normocephalic  Eyes:      Pupils: Pupils are equal, round, and reactive to light  Cardiovascular:      Rate and Rhythm: Normal rate and regular rhythm  Heart sounds: Normal heart sounds     Pulmonary:      Effort: Pulmonary effort is normal  Breath sounds: Normal breath sounds  Abdominal:      General: Bowel sounds are normal       Palpations: Abdomen is soft  Tenderness: There is no abdominal tenderness  Musculoskeletal:      Cervical back: Normal range of motion  Skin:     General: Skin is warm  Neurological:      Mental Status: He is alert and oriented to person, place, and time

## 2022-03-10 DIAGNOSIS — M25.50 POLYARTHRALGIA: ICD-10-CM

## 2022-03-10 RX ORDER — PREDNISONE 10 MG/1
TABLET ORAL
Qty: 30 TABLET | Refills: 0 | Status: SHIPPED | OUTPATIENT
Start: 2022-03-10 | End: 2022-05-18

## 2022-03-23 ENCOUNTER — APPOINTMENT (OUTPATIENT)
Dept: LAB | Facility: HOSPITAL | Age: 57
End: 2022-03-23
Attending: FAMILY MEDICINE
Payer: MEDICARE

## 2022-03-23 DIAGNOSIS — M25.50 POLYARTHRALGIA: ICD-10-CM

## 2022-03-23 LAB
BASOPHILS # BLD AUTO: 0.07 THOUSANDS/ΜL (ref 0–0.1)
BASOPHILS NFR BLD AUTO: 1 % (ref 0–1)
CRP SERPL QL: 26.2 MG/L
EOSINOPHIL # BLD AUTO: 0.38 THOUSAND/ΜL (ref 0–0.61)
EOSINOPHIL NFR BLD AUTO: 4 % (ref 0–6)
ERYTHROCYTE [DISTWIDTH] IN BLOOD BY AUTOMATED COUNT: 15.7 % (ref 11.6–15.1)
HCT VFR BLD AUTO: 40.5 % (ref 36.5–49.3)
HGB BLD-MCNC: 12.8 G/DL (ref 12–17)
IMM GRANULOCYTES # BLD AUTO: 0.04 THOUSAND/UL (ref 0–0.2)
IMM GRANULOCYTES NFR BLD AUTO: 1 % (ref 0–2)
LYMPHOCYTES # BLD AUTO: 2.19 THOUSANDS/ΜL (ref 0.6–4.47)
LYMPHOCYTES NFR BLD AUTO: 26 % (ref 14–44)
MCH RBC QN AUTO: 29.3 PG (ref 26.8–34.3)
MCHC RBC AUTO-ENTMCNC: 31.6 G/DL (ref 31.4–37.4)
MCV RBC AUTO: 93 FL (ref 82–98)
MONOCYTES # BLD AUTO: 0.93 THOUSAND/ΜL (ref 0.17–1.22)
MONOCYTES NFR BLD AUTO: 11 % (ref 4–12)
NEUTROPHILS # BLD AUTO: 4.95 THOUSANDS/ΜL (ref 1.85–7.62)
NEUTS SEG NFR BLD AUTO: 57 % (ref 43–75)
NRBC BLD AUTO-RTO: 0 /100 WBCS
PLATELET # BLD AUTO: 360 THOUSANDS/UL (ref 149–390)
PMV BLD AUTO: 8.7 FL (ref 8.9–12.7)
RBC # BLD AUTO: 4.37 MILLION/UL (ref 3.88–5.62)
WBC # BLD AUTO: 8.56 THOUSAND/UL (ref 4.31–10.16)

## 2022-03-23 PROCEDURE — 86140 C-REACTIVE PROTEIN: CPT

## 2022-03-23 PROCEDURE — 85025 COMPLETE CBC W/AUTO DIFF WBC: CPT

## 2022-03-23 PROCEDURE — 86038 ANTINUCLEAR ANTIBODIES: CPT

## 2022-03-23 PROCEDURE — 36415 COLL VENOUS BLD VENIPUNCTURE: CPT

## 2022-03-25 LAB — RYE IGE QN: NEGATIVE

## 2022-04-01 ENCOUNTER — TELEPHONE (OUTPATIENT)
Dept: FAMILY MEDICINE CLINIC | Facility: CLINIC | Age: 57
End: 2022-04-01

## 2022-04-19 ENCOUNTER — OFFICE VISIT (OUTPATIENT)
Dept: FAMILY MEDICINE CLINIC | Facility: CLINIC | Age: 57
End: 2022-04-19
Payer: MEDICARE

## 2022-04-19 VITALS
HEART RATE: 84 BPM | WEIGHT: 172 LBS | RESPIRATION RATE: 20 BRPM | DIASTOLIC BLOOD PRESSURE: 84 MMHG | TEMPERATURE: 96.2 F | BODY MASS INDEX: 25.48 KG/M2 | HEIGHT: 69 IN | SYSTOLIC BLOOD PRESSURE: 134 MMHG

## 2022-04-19 DIAGNOSIS — Z90.6 ACQUIRED ABSENCE OF OTHER PARTS OF URINARY TRACT: ICD-10-CM

## 2022-04-19 DIAGNOSIS — N18.4 CHRONIC KIDNEY DISEASE, STAGE 4 (SEVERE) (HCC): ICD-10-CM

## 2022-04-19 DIAGNOSIS — M25.50 POLYARTHRALGIA: Primary | ICD-10-CM

## 2022-04-19 DIAGNOSIS — C67.9 MALIGNANT NEOPLASM OF URINARY BLADDER, UNSPECIFIED SITE (HCC): ICD-10-CM

## 2022-04-19 PROCEDURE — 99213 OFFICE O/P EST LOW 20 MIN: CPT | Performed by: FAMILY MEDICINE

## 2022-04-19 RX ORDER — FAMOTIDINE 20 MG/1
20 TABLET, FILM COATED ORAL 2 TIMES DAILY
Qty: 60 TABLET | Refills: 2 | Status: SHIPPED | OUTPATIENT
Start: 2022-04-19 | End: 2022-07-29

## 2022-04-19 RX ORDER — PREDNISONE 10 MG/1
10 TABLET ORAL DAILY
Qty: 30 TABLET | Refills: 0 | Status: SHIPPED | OUTPATIENT
Start: 2022-04-19 | End: 2022-05-18

## 2022-04-19 NOTE — PROGRESS NOTES
Assessment/Plan:  Polyarthralgia will provide another course of prednisone and refer to Rheumatology  Will also supply Pepcid 20 mg b i d  The patient states that he had stomach upset from the prednisone last course  Nonsteroidal anti-inflammatories are contraindicated due to stage 4 chronic kidney disease  History of malignant neoplasia of the urinary bladder    Secondary hypertension with blood pressure controlled on Toprol XL 25 and Norvasc 10 mg    Problem List Items Addressed This Visit     None      Visit Diagnoses     Polyarthralgia    -  Primary    Relevant Medications    famotidine (PEPCID) 20 mg tablet    predniSONE 10 mg tablet    Other Relevant Orders    Ambulatory Referral to Rheumatology           Diagnoses and all orders for this visit:    Polyarthralgia  -     Ambulatory Referral to Rheumatology; Future  -     famotidine (PEPCID) 20 mg tablet; Take 1 tablet (20 mg total) by mouth 2 (two) times a day  -     predniSONE 10 mg tablet; Take 1 tablet (10 mg total) by mouth daily        No problem-specific Assessment & Plan notes found for this encounter  PHQ-2/9 Depression Screening    Little interest or pleasure in doing things: 0 - not at all  Feeling down, depressed, or hopeless: 0 - not at all  PHQ-2 Score: 0  PHQ-2 Interpretation: Negative depression screen          Body mass index is 25 4 kg/m²  BMI Counseling: Body mass index is 25 4 kg/m²  The BMI   Subjective:      Patient ID: Bentley Mcclain is a 60 y o  male  Patient presents with recurrence of polyarthralgias      The following portions of the patient's history were reviewed and updated as appropriate:   He has a past medical history of 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 Jyoti Peralta is a 25year old female who is seen in the clinic today for evaluation of a symptomatic mass at the volar radial wrist crease. It has been present for years. She thinks about 2 years. Currently she is not working. Symptoms to include pain are worsening. No numbness or tingling in the digits. No injury or traumatic event she can recall. Interim history: Patient returns 1 month after the above evaluation. I injected the right volar carpal ganglion cyst 1 month ago. She reports no improvement in symptoms. She reports diffuse wrist pain. No numbness or tingling in the digits. She is currently not working. Interim history: Patient returns about 2 months after the above evaluation. She comes in reporting that she is participating in hand therapy. This is helping to some extent. She now reports intermittent numbness and tingling in her hand. Her therapist felt she may be suffering from carpal tunnel syndrome. She comes in for reevaluation. She does use a wrist guard at night. Interim history: Patient returns 1 month after the above evaluation. At the last visit we provided her with a couple tunnel corticosteroid injection for the right upper extremity. She comes in today reporting minimal improvement in her symptoms. She still has intermittent hand pain. She notices it when she shampoos her hair. She does not awaken at night. She comes in seeking additional treatment. Interim history: Patient returns approximately 2 months from the above evaluation. She had her EMG test completed which showed mild to moderate right carpal tunnel syndrome and right ulnar neuropathy at the elbow level. She states that she has been wearing her braces at night and they tend to help some.   She states that she still has intermittent numbness and tingling as well as pain in [place] (2009)  ,  family history includes Aneurysm in his mother; Heart failure in his father  ,   reports that he has been smoking cigarettes  He has been smoking about 0 50 packs per day  He has never used smokeless tobacco  He reports that he does not drink alcohol and does not use drugs  ,  has No Known Allergies     Current Outpatient Medications   Medication Sig Dispense Refill    alprostadil (EDEX) 20 MCG injection 20 mcg by Intracavitary route as needed for erectile dysfunction 5 vial 6    amLODIPine (NORVASC) 10 mg tablet Take 1 tablet (10 mg total) by mouth daily 90 tablet 1    dronabinol (MARINOL) 5 MG capsule TAKE 1 CAPSULE BY MOUTH EVERY DAY 90 capsule 0    famotidine (PEPCID) 20 mg tablet Take 1 tablet (20 mg total) by mouth 2 (two) times a day 60 tablet 2    methylPREDNISolone 4 MG tablet therapy pack Use as directed on package (Patient not taking: Reported on 4/19/2022 ) 21 each 0    metoprolol succinate (TOPROL-XL) 25 mg 24 hr tablet Take 1 tablet (25 mg total) by mouth daily 90 tablet 1    Multiple Vitamin (DAILY VALUE MULTIVITAMIN) TABS Take 1 tablet by mouth daily      Potassium Citrate ER 15 MEQ (1620 MG) TBCR Take by mouth (Patient not taking: Reported on 4/19/2022 )      predniSONE 10 mg tablet TAKE 1 TABLET BY MOUTH EVERY DAY (Patient not taking: Reported on 4/19/2022) 30 tablet 0    predniSONE 10 mg tablet Take 1 tablet (10 mg total) by mouth daily 30 tablet 0    PROSTAGLANDIN PGE1 INJECTABLE 20 MCG/ML, STANDARD, IJ SOLN Inject 0 3mL to 1 0mL IC 20 minutes prior to intercourse  (Patient not taking: Reported on 9/25/2020) 10 mL 0    sodium bicarbonate 650 mg tablet Take by mouth       No current facility-administered medications for this visit  Review of Systems   Constitutional: Negative for chills and fever  HENT: Negative for ear pain and sore throat  Eyes: Negative for pain and visual disturbance  Respiratory: Negative for cough and shortness of breath  her hand. She is right-hand dominant. She states that these symptoms are worse when she is using the vacuum or other vibrating devices as well as shampooing her hair. She is also tried a corticosteroid injection in the carpal tunnel at a previous visit which provided no relief. Patient seen and examined. History per the above. She comes in with continued right hand complaints including numbness and tingling with pain radiating into her upper arm. MEDICATIONS:  Medications were reviewed and updated today. HISTORIES:    ALLERGIES:  Patient has no known allergies. Past Medical History:   Diagnosis Date   â¢ Anxiety    â¢ No known problems        Past Surgical History:   Procedure Laterality Date   â¢ Finger exploration      tendon sheath        Family History   Problem Relation Age of Onset   â¢ Patient is unaware of any medical problems Mother    â¢ Patient is unaware of any medical problems Father    â¢ Cancer, Breast Neg Hx    â¢ Cancer, Colon Neg Hx    â¢ Cancer, Ovarian Neg Hx        Social History     Tobacco Use   â¢ Smoking status: Never Smoker   â¢ Smokeless tobacco: Never Used   Substance Use Topics   â¢ Alcohol use: No     Frequency: Never       REVIEW OF SYSTEMS:    All other systems are reviewed and are negative except as documented in the HPI (History of Present Illness)    PHYSICAL EXAM    There were no vitals taken for this visit. Constitutional: Well developed, well nourished individual in no acute distress. Skin: Warm, dry, intact without rash or lesion. COR:  RRR    Lungs:  No labored respirations    Neurologic: Alert & oriented x 3, Normal gait. Psychiatric: Speech and behavior appropriate. Musculoskeletal:  Right upper extremity: Skin is intact. No ecchymosis or swelling noted. Elbow, wrist, hand motion are all intact. She is able to make full composite fist and extend digits. APB strength is intact. No thenar atrophy noted.   Positive median nerve compression test. Cardiovascular: Negative for chest pain and palpitations  Gastrointestinal: Negative for abdominal pain and vomiting  Genitourinary: Negative for dysuria and hematuria  Musculoskeletal: Positive for arthralgias and myalgias  Negative for back pain  Diffuse arthralgias myalgias   Skin: Negative for color change and rash  Neurological: Negative for seizures and syncope  All other systems reviewed and are negative  Objective:    /84   Pulse 84   Temp (!) 96 2 °F (35 7 °C)   Resp 20   Ht 5' 9" (1 753 m)   Wt 78 kg (172 lb)   BMI 25 40 kg/m²   Body mass index is 25 4 kg/m²  Physical Exam  Constitutional:       Appearance: He is well-developed  HENT:      Head: Normocephalic  Eyes:      Pupils: Pupils are equal, round, and reactive to light  Cardiovascular:      Rate and Rhythm: Normal rate and regular rhythm  Heart sounds: Normal heart sounds  Pulmonary:      Effort: Pulmonary effort is normal       Breath sounds: Normal breath sounds  Abdominal:      General: Bowel sounds are normal       Palpations: Abdomen is soft  Tenderness: There is no abdominal tenderness  Musculoskeletal:      Cervical back: Normal range of motion  Skin:     General: Skin is warm  Neurological:      Mental Status: He is alert and oriented to person, place, and time  Positive Phalen's test.  Light sensation intact distally. Brisk capillary refill present. She has no intrinsic atrophy. Negative Froment's sign. Positive elbow flexion test.      IMAGING  I have reviewed the pertinent imaging study reports. These are the pertinent findings:  Xr Wrist Min 3 Views Right    Result Date: 9/23/2020  3 views of the right wrist to include a PA, lateral, and oblique view were obtained in the office today. They demonstrate normal bony architecture. Normal study. EMG/NCS was reviewed. She has evidence of mild to moderate carpal tunnel syndrome as well as ulnar nerve slowing at the elbow level. ASSESSMENT/PLAN:    Carpal tunnel syndrome on right    Cubital tunnel syndrome on right       We discussed the results from her EMG test that showed she had mild to moderate right carpal tunnel syndrome as well as a right ulnar neuropathy at the level of the elbow. .  We discussed that due to the incomplete resolution of her symptoms with the brace and the corticosteroid injection that the next best course of treatment is a carpal tunnel release procedure and a right ulnar nerve decompression at the elbow. We discussed the benefits and the risks of this procedure to include incomplete resolution of symptoms or infection. She will call the surgery scheduler to arrange a carpal tunnel release procedure. Patient is agreement with plan. Patient was seen and examined with physician assistant. I agree with the assessment of: Right carpal tunnel syndrome and right cubital tunnel syndrome  She has failed extensive nonoperative management to include a previous corticosteroid injection and hand therapy. At this point her EMG as well as history and physical exam findings confirm the above diagnoses. My recommendation is carpal tunnel release as well as ulnar nerve decompression at the elbow. I reviewed the typical operative, postoperative course.   Risks to include infection, stiffness, incomplete relief of symptoms and need for further surgery were detailed at length. Probably she would like to proceed. We will do this as an outpatient procedure under brief sedation with local anesthesia. She will call to schedule accordingly. She was in agreement with this plan.

## 2022-05-17 DIAGNOSIS — M25.50 POLYARTHRALGIA: ICD-10-CM

## 2022-05-18 RX ORDER — PREDNISONE 10 MG/1
TABLET ORAL
Qty: 30 TABLET | Refills: 0 | Status: SHIPPED | OUTPATIENT
Start: 2022-05-18

## 2022-05-20 ENCOUNTER — TELEPHONE (OUTPATIENT)
Dept: FAMILY MEDICINE CLINIC | Facility: CLINIC | Age: 57
End: 2022-05-20

## 2022-05-20 DIAGNOSIS — M25.50 POLYARTHRALGIA: Primary | ICD-10-CM

## 2022-05-20 NOTE — TELEPHONE ENCOUNTER
Request Rheumatology referral to be faxed to Dr Arcelia Garber at [phone] [facility]'s unable to get him in for months - reordered and faxed

## 2022-05-31 LAB — HCV AB SER-ACNC: NEGATIVE

## 2022-06-18 DIAGNOSIS — I15.9 SECONDARY HYPERTENSION: ICD-10-CM

## 2022-06-18 RX ORDER — METOPROLOL SUCCINATE 25 MG/1
25 TABLET, EXTENDED RELEASE ORAL DAILY
Qty: 90 TABLET | Refills: 1 | Status: SHIPPED | OUTPATIENT
Start: 2022-06-18

## 2022-06-18 RX ORDER — AMLODIPINE BESYLATE 10 MG/1
TABLET ORAL
Qty: 90 TABLET | Refills: 1 | Status: SHIPPED | OUTPATIENT
Start: 2022-06-18

## 2022-07-29 DIAGNOSIS — M25.50 POLYARTHRALGIA: ICD-10-CM

## 2022-07-29 RX ORDER — FAMOTIDINE 20 MG/1
TABLET, FILM COATED ORAL
Qty: 60 TABLET | Refills: 2 | Status: SHIPPED | OUTPATIENT
Start: 2022-07-29

## 2023-01-24 ENCOUNTER — TELEPHONE (OUTPATIENT)
Dept: FAMILY MEDICINE CLINIC | Facility: CLINIC | Age: 58
End: 2023-01-24

## 2023-02-12 ENCOUNTER — APPOINTMENT (EMERGENCY)
Dept: CT IMAGING | Facility: HOSPITAL | Age: 58
End: 2023-02-12

## 2023-02-12 ENCOUNTER — HOSPITAL ENCOUNTER (INPATIENT)
Facility: HOSPITAL | Age: 58
LOS: 3 days | Discharge: HOME HEALTH | End: 2023-02-15
Attending: EMERGENCY MEDICINE | Admitting: SURGERY

## 2023-02-12 ENCOUNTER — ANESTHESIA EVENT (EMERGENCY)
Dept: PERIOP | Facility: HOSPITAL | Age: 58
End: 2023-02-12

## 2023-02-12 ENCOUNTER — ANESTHESIA (EMERGENCY)
Dept: PERIOP | Facility: HOSPITAL | Age: 58
End: 2023-02-12

## 2023-02-12 DIAGNOSIS — L98.8 PILONIDAL DISEASE: ICD-10-CM

## 2023-02-12 DIAGNOSIS — N17.9 AKI (ACUTE KIDNEY INJURY) (HCC): ICD-10-CM

## 2023-02-12 DIAGNOSIS — K61.0 PERIANAL ABSCESS: Primary | ICD-10-CM

## 2023-02-12 PROBLEM — Z85.51 HISTORY OF BLADDER CANCER: Status: ACTIVE | Noted: 2023-02-12

## 2023-02-12 PROBLEM — I10 PRIMARY HYPERTENSION: Status: ACTIVE | Noted: 2023-02-12

## 2023-02-12 PROBLEM — M25.50 POLYARTHRALGIA: Status: ACTIVE | Noted: 2023-02-12

## 2023-02-12 LAB
ALBUMIN SERPL BCP-MCNC: 3.8 G/DL (ref 3.5–5)
ALP SERPL-CCNC: 102 U/L (ref 34–104)
ALT SERPL W P-5'-P-CCNC: 10 U/L (ref 7–52)
ANION GAP SERPL CALCULATED.3IONS-SCNC: 11 MMOL/L (ref 4–13)
APTT PPP: 31 SECONDS (ref 23–37)
AST SERPL W P-5'-P-CCNC: 11 U/L (ref 13–39)
BASOPHILS # BLD AUTO: 0.09 THOUSANDS/ÂΜL (ref 0–0.1)
BASOPHILS NFR BLD AUTO: 1 % (ref 0–1)
BILIRUB SERPL-MCNC: 0.46 MG/DL (ref 0.2–1)
BUN SERPL-MCNC: 47 MG/DL (ref 5–25)
CALCIUM SERPL-MCNC: 9.4 MG/DL (ref 8.4–10.2)
CHLORIDE SERPL-SCNC: 106 MMOL/L (ref 96–108)
CO2 SERPL-SCNC: 18 MMOL/L (ref 21–32)
CREAT SERPL-MCNC: 3.55 MG/DL (ref 0.6–1.3)
EOSINOPHIL # BLD AUTO: 0.2 THOUSAND/ÂΜL (ref 0–0.61)
EOSINOPHIL NFR BLD AUTO: 1 % (ref 0–6)
ERYTHROCYTE [DISTWIDTH] IN BLOOD BY AUTOMATED COUNT: 14.6 % (ref 11.6–15.1)
GFR SERPL CREATININE-BSD FRML MDRD: 17 ML/MIN/1.73SQ M
GLUCOSE SERPL-MCNC: 108 MG/DL (ref 65–140)
HCT VFR BLD AUTO: 50.3 % (ref 36.5–49.3)
HGB BLD-MCNC: 16.2 G/DL (ref 12–17)
IMM GRANULOCYTES # BLD AUTO: 0.13 THOUSAND/UL (ref 0–0.2)
IMM GRANULOCYTES NFR BLD AUTO: 1 % (ref 0–2)
INR PPP: 1.08 (ref 0.84–1.19)
LACTATE SERPL-SCNC: 0.7 MMOL/L (ref 0.5–2)
LYMPHOCYTES # BLD AUTO: 1.08 THOUSANDS/ÂΜL (ref 0.6–4.47)
LYMPHOCYTES NFR BLD AUTO: 6 % (ref 14–44)
MCH RBC QN AUTO: 29.3 PG (ref 26.8–34.3)
MCHC RBC AUTO-ENTMCNC: 32.2 G/DL (ref 31.4–37.4)
MCV RBC AUTO: 91 FL (ref 82–98)
MONOCYTES # BLD AUTO: 1.48 THOUSAND/ÂΜL (ref 0.17–1.22)
MONOCYTES NFR BLD AUTO: 8 % (ref 4–12)
NEUTROPHILS # BLD AUTO: 16.65 THOUSANDS/ÂΜL (ref 1.85–7.62)
NEUTS SEG NFR BLD AUTO: 83 % (ref 43–75)
NRBC BLD AUTO-RTO: 0 /100 WBCS
PLATELET # BLD AUTO: 369 THOUSANDS/UL (ref 149–390)
PMV BLD AUTO: 8.8 FL (ref 8.9–12.7)
POTASSIUM SERPL-SCNC: 3.5 MMOL/L (ref 3.5–5.3)
PROCALCITONIN SERPL-MCNC: 0.49 NG/ML
PROT SERPL-MCNC: 8 G/DL (ref 6.4–8.4)
PROTHROMBIN TIME: 14 SECONDS (ref 11.6–14.5)
RBC # BLD AUTO: 5.53 MILLION/UL (ref 3.88–5.62)
SODIUM SERPL-SCNC: 135 MMOL/L (ref 135–147)
WBC # BLD AUTO: 19.63 THOUSAND/UL (ref 4.31–10.16)

## 2023-02-12 PROCEDURE — 0H98XZZ DRAINAGE OF BUTTOCK SKIN, EXTERNAL APPROACH: ICD-10-PCS | Performed by: SURGERY

## 2023-02-12 PROCEDURE — 0D9Q0ZZ DRAINAGE OF ANUS, OPEN APPROACH: ICD-10-PCS | Performed by: SURGERY

## 2023-02-12 RX ORDER — FENTANYL CITRATE/PF 50 MCG/ML
50 SYRINGE (ML) INJECTION
Status: CANCELLED | OUTPATIENT
Start: 2023-02-12

## 2023-02-12 RX ORDER — ONDANSETRON 2 MG/ML
4 INJECTION INTRAMUSCULAR; INTRAVENOUS ONCE AS NEEDED
Status: DISCONTINUED | OUTPATIENT
Start: 2023-02-12 | End: 2023-02-12 | Stop reason: HOSPADM

## 2023-02-12 RX ORDER — HYDROMORPHONE HCL/PF 1 MG/ML
0.5 SYRINGE (ML) INJECTION ONCE
Status: COMPLETED | OUTPATIENT
Start: 2023-02-12 | End: 2023-02-12

## 2023-02-12 RX ORDER — LIDOCAINE HYDROCHLORIDE 20 MG/ML
INJECTION, SOLUTION EPIDURAL; INFILTRATION; INTRACAUDAL; PERINEURAL AS NEEDED
Status: DISCONTINUED | OUTPATIENT
Start: 2023-02-12 | End: 2023-02-12

## 2023-02-12 RX ORDER — ACETAMINOPHEN 325 MG/1
650 TABLET ORAL EVERY 6 HOURS PRN
Status: DISCONTINUED | OUTPATIENT
Start: 2023-02-12 | End: 2023-02-15 | Stop reason: HOSPADM

## 2023-02-12 RX ORDER — ONDANSETRON 2 MG/ML
4 INJECTION INTRAMUSCULAR; INTRAVENOUS ONCE AS NEEDED
Status: CANCELLED | OUTPATIENT
Start: 2023-02-12

## 2023-02-12 RX ORDER — HYDROMORPHONE HCL/PF 1 MG/ML
0.5 SYRINGE (ML) INJECTION
Status: CANCELLED | OUTPATIENT
Start: 2023-02-12

## 2023-02-12 RX ORDER — HEPARIN SODIUM 5000 [USP'U]/ML
5000 INJECTION, SOLUTION INTRAVENOUS; SUBCUTANEOUS EVERY 8 HOURS SCHEDULED
Status: DISCONTINUED | OUTPATIENT
Start: 2023-02-12 | End: 2023-02-15 | Stop reason: HOSPADM

## 2023-02-12 RX ORDER — HEPARIN SODIUM 5000 [USP'U]/ML
INJECTION, SOLUTION INTRAVENOUS; SUBCUTANEOUS AS NEEDED
Status: DISCONTINUED | OUTPATIENT
Start: 2023-02-12 | End: 2023-02-12

## 2023-02-12 RX ORDER — ONDANSETRON 2 MG/ML
4 INJECTION INTRAMUSCULAR; INTRAVENOUS EVERY 6 HOURS PRN
Status: DISCONTINUED | OUTPATIENT
Start: 2023-02-12 | End: 2023-02-15 | Stop reason: HOSPADM

## 2023-02-12 RX ORDER — BUPIVACAINE HYDROCHLORIDE 5 MG/ML
INJECTION, SOLUTION PERINEURAL AS NEEDED
Status: DISCONTINUED | OUTPATIENT
Start: 2023-02-12 | End: 2023-02-12 | Stop reason: HOSPADM

## 2023-02-12 RX ORDER — PROPOFOL 10 MG/ML
INJECTION, EMULSION INTRAVENOUS AS NEEDED
Status: DISCONTINUED | OUTPATIENT
Start: 2023-02-12 | End: 2023-02-12

## 2023-02-12 RX ORDER — ONDANSETRON 2 MG/ML
INJECTION INTRAMUSCULAR; INTRAVENOUS AS NEEDED
Status: DISCONTINUED | OUTPATIENT
Start: 2023-02-12 | End: 2023-02-12

## 2023-02-12 RX ORDER — OXYCODONE HYDROCHLORIDE 5 MG/1
5 TABLET ORAL
Status: DISCONTINUED | OUTPATIENT
Start: 2023-02-12 | End: 2023-02-15 | Stop reason: HOSPADM

## 2023-02-12 RX ORDER — FENTANYL CITRATE 50 UG/ML
INJECTION, SOLUTION INTRAMUSCULAR; INTRAVENOUS AS NEEDED
Status: DISCONTINUED | OUTPATIENT
Start: 2023-02-12 | End: 2023-02-12

## 2023-02-12 RX ORDER — HYDROMORPHONE HCL/PF 1 MG/ML
0.5 SYRINGE (ML) INJECTION
Status: DISCONTINUED | OUTPATIENT
Start: 2023-02-12 | End: 2023-02-14

## 2023-02-12 RX ORDER — HYDROMORPHONE HCL/PF 1 MG/ML
0.2 SYRINGE (ML) INJECTION
Status: DISCONTINUED | OUTPATIENT
Start: 2023-02-12 | End: 2023-02-12 | Stop reason: HOSPADM

## 2023-02-12 RX ORDER — SUCCINYLCHOLINE/SOD CL,ISO/PF 100 MG/5ML
SYRINGE (ML) INTRAVENOUS AS NEEDED
Status: DISCONTINUED | OUTPATIENT
Start: 2023-02-12 | End: 2023-02-12

## 2023-02-12 RX ORDER — CEFEPIME HYDROCHLORIDE 2 G/50ML
2000 INJECTION, SOLUTION INTRAVENOUS ONCE
Status: COMPLETED | OUTPATIENT
Start: 2023-02-12 | End: 2023-02-12

## 2023-02-12 RX ORDER — PREDNISONE 1 MG/1
1 TABLET ORAL DAILY
Status: DISCONTINUED | OUTPATIENT
Start: 2023-02-14 | End: 2023-02-15 | Stop reason: HOSPADM

## 2023-02-12 RX ORDER — CEFEPIME HYDROCHLORIDE 1 G/50ML
1000 INJECTION, SOLUTION INTRAVENOUS EVERY 12 HOURS
Status: DISCONTINUED | OUTPATIENT
Start: 2023-02-13 | End: 2023-02-12

## 2023-02-12 RX ORDER — NICOTINE 21 MG/24HR
1 PATCH, TRANSDERMAL 24 HOURS TRANSDERMAL DAILY
Status: DISCONTINUED | OUTPATIENT
Start: 2023-02-12 | End: 2023-02-15 | Stop reason: HOSPADM

## 2023-02-12 RX ORDER — SODIUM CHLORIDE, SODIUM GLUCONATE, SODIUM ACETATE, POTASSIUM CHLORIDE, MAGNESIUM CHLORIDE, SODIUM PHOSPHATE, DIBASIC, AND POTASSIUM PHOSPHATE .53; .5; .37; .037; .03; .012; .00082 G/100ML; G/100ML; G/100ML; G/100ML; G/100ML; G/100ML; G/100ML
50 INJECTION, SOLUTION INTRAVENOUS CONTINUOUS
Status: DISCONTINUED | OUTPATIENT
Start: 2023-02-12 | End: 2023-02-14

## 2023-02-12 RX ORDER — EPHEDRINE SULFATE 50 MG/ML
INJECTION INTRAVENOUS AS NEEDED
Status: DISCONTINUED | OUTPATIENT
Start: 2023-02-12 | End: 2023-02-12

## 2023-02-12 RX ORDER — MAGNESIUM HYDROXIDE 1200 MG/15ML
LIQUID ORAL AS NEEDED
Status: DISCONTINUED | OUTPATIENT
Start: 2023-02-12 | End: 2023-02-12 | Stop reason: HOSPADM

## 2023-02-12 RX ORDER — PROMETHAZINE HYDROCHLORIDE 25 MG/ML
12.5 INJECTION, SOLUTION INTRAMUSCULAR; INTRAVENOUS ONCE AS NEEDED
Status: CANCELLED | OUTPATIENT
Start: 2023-02-12

## 2023-02-12 RX ORDER — VANCOMYCIN HYDROCHLORIDE 1 G/200ML
12.5 INJECTION, SOLUTION INTRAVENOUS ONCE AS NEEDED
Status: DISCONTINUED | OUTPATIENT
Start: 2023-02-13 | End: 2023-02-14

## 2023-02-12 RX ORDER — FENTANYL CITRATE/PF 50 MCG/ML
25 SYRINGE (ML) INJECTION
Status: DISCONTINUED | OUTPATIENT
Start: 2023-02-12 | End: 2023-02-12 | Stop reason: HOSPADM

## 2023-02-12 RX ADMIN — Medication 100 MG: at 15:57

## 2023-02-12 RX ADMIN — EPHEDRINE SULFATE 10 MG: 50 INJECTION, SOLUTION INTRAVENOUS at 16:10

## 2023-02-12 RX ADMIN — EPHEDRINE SULFATE 5 MG: 50 INJECTION, SOLUTION INTRAVENOUS at 16:06

## 2023-02-12 RX ADMIN — HYDROCORTISONE SODIUM SUCCINATE 25 MG: 100 INJECTION, POWDER, FOR SOLUTION INTRAMUSCULAR; INTRAVENOUS at 16:10

## 2023-02-12 RX ADMIN — HEPARIN SODIUM 5000 UNITS: 5000 INJECTION INTRAVENOUS; SUBCUTANEOUS at 21:42

## 2023-02-12 RX ADMIN — FENTANYL CITRATE 50 MCG: 50 INJECTION, SOLUTION INTRAMUSCULAR; INTRAVENOUS at 16:21

## 2023-02-12 RX ADMIN — ONDANSETRON 4 MG: 2 INJECTION INTRAMUSCULAR; INTRAVENOUS at 16:51

## 2023-02-12 RX ADMIN — HYDROMORPHONE HYDROCHLORIDE 0.5 MG: 1 INJECTION, SOLUTION INTRAMUSCULAR; INTRAVENOUS; SUBCUTANEOUS at 13:52

## 2023-02-12 RX ADMIN — PROPOFOL 30 MG: 10 INJECTION, EMULSION INTRAVENOUS at 16:21

## 2023-02-12 RX ADMIN — PROPOFOL 170 MG: 10 INJECTION, EMULSION INTRAVENOUS at 15:57

## 2023-02-12 RX ADMIN — CEFEPIME HYDROCHLORIDE 2000 MG: 2 INJECTION, SOLUTION INTRAVENOUS at 12:33

## 2023-02-12 RX ADMIN — SODIUM CHLORIDE 1000 ML: 0.9 INJECTION, SOLUTION INTRAVENOUS at 13:16

## 2023-02-12 RX ADMIN — SODIUM CHLORIDE, SODIUM GLUCONATE, SODIUM ACETATE, POTASSIUM CHLORIDE, MAGNESIUM CHLORIDE, SODIUM PHOSPHATE, DIBASIC, AND POTASSIUM PHOSPHATE 150 ML/HR: .53; .5; .37; .037; .03; .012; .00082 INJECTION, SOLUTION INTRAVENOUS at 17:59

## 2023-02-12 RX ADMIN — LIDOCAINE HYDROCHLORIDE 100 MG: 20 INJECTION, SOLUTION EPIDURAL; INFILTRATION; INTRACAUDAL; PERINEURAL at 15:57

## 2023-02-12 RX ADMIN — VANCOMYCIN HYDROCHLORIDE 1250 MG: 1 INJECTION, POWDER, LYOPHILIZED, FOR SOLUTION INTRAVENOUS at 12:53

## 2023-02-12 RX ADMIN — HYDROMORPHONE HYDROCHLORIDE 0.5 MG: 1 INJECTION, SOLUTION INTRAMUSCULAR; INTRAVENOUS; SUBCUTANEOUS at 12:33

## 2023-02-12 RX ADMIN — HEPARIN SODIUM 5000 UNITS: 5000 INJECTION INTRAVENOUS; SUBCUTANEOUS at 16:05

## 2023-02-12 RX ADMIN — FENTANYL CITRATE 50 MCG: 50 INJECTION, SOLUTION INTRAMUSCULAR; INTRAVENOUS at 15:57

## 2023-02-12 RX ADMIN — HYDROCORTISONE SODIUM SUCCINATE 25 MG: 100 INJECTION, POWDER, FOR SOLUTION INTRAMUSCULAR; INTRAVENOUS at 21:42

## 2023-02-12 RX ADMIN — IOHEXOL 100 ML: 350 INJECTION, SOLUTION INTRAVENOUS at 14:25

## 2023-02-12 RX ADMIN — SODIUM CHLORIDE, SODIUM GLUCONATE, SODIUM ACETATE, POTASSIUM CHLORIDE, MAGNESIUM CHLORIDE, SODIUM PHOSPHATE, DIBASIC, AND POTASSIUM PHOSPHATE 150 ML/HR: .53; .5; .37; .037; .03; .012; .00082 INJECTION, SOLUTION INTRAVENOUS at 15:10

## 2023-02-12 NOTE — ANESTHESIA POSTPROCEDURE EVALUATION
Post-Op Assessment Note    CV Status:  Stable    Pain management: satisfactory to patient     Mental Status:  Sleepy and arousable   Hydration Status:  Euvolemic   PONV Controlled:  Controlled   Airway Patency:  Patent      Post Op Vitals Reviewed: Yes      Staff: CRNA, Anesthesiologist         No notable events documented      BP   126/66   Temp      Pulse  69   Resp   16   SpO2   99

## 2023-02-12 NOTE — CONSULTS
Consultation - Nephrology   Zechariah Gibson 59 y o  male MRN: [id number]  Unit/Bed#: OR POOL Encounter: [id number]      A/P:  1  Acute kidney injury on top of chronic kidney disease   Most likely due to acute infection, patient status post 1 L normal saline volume resuscitation  We will continue volume expansion with isotonic saline in the form of Isolyte at 150 mL/hour for now  Continue to avoid other potential nephrotoxins  Patient will need to have exposure to IV contrast, please refer below  Patient also be at risk for worsening kidney function due to urgent surgical intervention that is necessary and needed to stabilize the patient's medical condition  2   Chronic kidney disease stage IV with baseline creatinine between 2 9-3 mg/dL  3  IV contrast prophylaxis   Unfortunately, at this time, the patient will be at high risk for contrast-induced nephropathy given clinical circumstances  He is aware of these risks and is willing to proceed  In order to mitigate risk is much as possible, we will provide the patient with additional isotonic saline at 150 mL/hour  We will continue closely monitor kidney function, urine output, and electrolytes over the next 24-72 hours  4   Hypertension the setting of chronic kidney disease stage IV   Blood pressures have been appropriate, patient is on amlodipine in the outpatient setting  5   Metabolic acidosis   Patient is on sodium bicarb in the outpatient setting, follow-up serum bicarb, add oral or IV supplementation depending any progresses well clinical standpoint  6   Perianal abscess   Patient for incision and drainage by our surgical colleagues  Continue antibiotics according to the recommendations, follow-up cultures  Try to maintain mean arterial pressure at or above 65 mmHg as best as possible  Continue with volume expansion as indicated needed  7  Chronic pilonidal cyst  8    History of bladder cancer status post cystectomy and ilial conduit       Thank you for allowing us to participate in the care of your patient  Please feel free to contact us regarding the care of this patient, or any other questions/concerns that may be applicable  There is no problem list on file for this patient  History of Present Illness   Physician Requesting Consult: Kerry Childers MD  Reason for Consult / Principal Problem: Acute kidney injury  Hx and PE limited by:   HPI: Zechariah Gibson is a 59y o  year old male who presented to the emergency department earlier today due to worsening pain in the sacral area from a known pilonidal cyst with perianal pain as well as drainage  Patient has this condition as a chronic problem, he was using warm compresses hoping that this would improve the situation and not warrant further evaluation  Patient presented today as he was unable to manage the infection or pain  Our surgical colleagues have evaluated the patient and will most likely proceed with incision and drainage of the abscess  From the renal standpoint, the patient has known chronic kidney disease stage IV, he has an extensive  history status post bladder cancer which necessitated chemotherapy as well as cystectomy and is status post ileal conduit  Patient appears to have a baseline creatinine between 2 9-3 mg/dL, he present with a creatinine of 3 55 mg/dL  He has a borderline low potassium at 3 5 mmol/L, serum bicarbonate slightly reduced at 18 mmol/L  In general, the patient has poor oral intake of fluids over the last 48-72 hours  He denies use of nonsteroidal anti-inflammatory medication  Patient appears to have a history of metabolic acidosis, takes sodium bicarbonate supplementation  History obtained from chart review and the patient    Constitutional ROS- Denies fatigue, fever, chills, night sweats, weight changes  HEENT ROS- Denies history of eye surgeries, glaucoma, headaches or history of trauma, blurred vision     Endocrine ROS- No history diabetes mellitus or thyroid disease  Cardiovascular ROS- Denies chest pain, palpitation, dyspnea exertion, orthopnea, claudication  Pulmonary ROS- Denies history of COPD, asthma  Denies cough, hemoptysis, shortness of breath  GI ROS- Denies abdominal pain, diarrhea, nausea, swallowing problems, vomiting, constipation, blood in stools, fecal incontinence  Hematological ROS- Denies history of easy bruising, blood clots, bleeding or blood transfusions  Genitourinary ROS- Denies recent hematuria, pyuria, flank pain, change in urinary stream, decreased urinary output, increased urinary frequency, nocturia, foamy urine, or urinary incontinence  Lymphatic ROS- Denies lymphadenopathy  Musculoskeletal ROS- Denies history of muscle weakness, joint pain  Dermatological ROS- Denies rash, wounds, ulcers, itching, jaundice  Psychiatric ROS- Denies anxiety, depression, hallucinations, disorientation  Neurological ROS- No stroke or TIA symptoms        Historical Information   Past Medical History:   Diagnosis Date   • 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    • Calculus of kidney    • Hyperlipidemia    • Hypertension    • Malignant neoplasm of ureteric orifice (HCC)    • Other male erectile dysfunction      Past Surgical History:   Procedure Laterality Date   • CYSTECTOMY W/ URETEROILEAL CONDUIT  2009     Social History   Social History     Substance and Sexual Activity   Alcohol Use No     Social History     Substance and Sexual Activity   Drug Use No     Social History     Tobacco Use   Smoking Status Every Day   • Packs/day: 0 50   • Types: Cigarettes   Smokeless Tobacco Never   Tobacco Comments    01/31/2022-- max 0 5pk a day     Family History   Problem Relation Age of Onset   • Aneurysm Mother    • Heart failure Father        Meds/Allergies   all current active meds have been reviewed, current meds:   Current Facility-Administered Medications   Medication Dose Route Frequency   • acetaminophen (TYLENOL) tablet 650 mg  650 mg Oral Q6H PRN   • [START ON 2/13/2023] cefepime (MAXIPIME) 500 mg in sodium chloride 0 9 % 50 mL IVPB  500 mg Intravenous Q24H   • heparin (porcine) subcutaneous injection 5,000 Units  5,000 Units Subcutaneous Q8H [address]   • HYDROmorphone (DILAUDID) injection 0 5 mg  0 5 mg Intravenous Q3H PRN   • multi-electrolyte (PLASMALYTE-A/ISOLYTE-S PH 7 4) IV solution  150 mL/hr Intravenous Continuous   • nicotine (NICODERM CQ) 14 mg/24hr TD 24 hr patch 1 patch  1 patch Transdermal Daily   • ondansetron (ZOFRAN) injection 4 mg  4 mg Intravenous Q6H PRN   • oxyCODONE (ROXICODONE) IR tablet 5 mg  5 mg Oral Q3H PRN   • [START ON 2/13/2023] vancomycin (VANCOCIN) IVPB (premix in dextrose) 1,000 mg 200 mL  12 5 mg/kg Intravenous Once PRN     Facility-Administered Medications Ordered in Other Encounters   Medication Dose Route Frequency   • ePHEDrine injection   Intravenous PRN   • fentanyl citrate (PF) 100 MCG/2ML   Intravenous PRN   • heparin (porcine) subcutaneous injection   Subcutaneous PRN   • hydrocortisone (Solu-CORTEF) injection   Intravenous PRN   • lidocaine (PF) (XYLOCAINE-MPF) 2 % injection   Intravenous PRN   • propofol (DIPRIVAN) 200 MG/20ML bolus injection   Intravenous PRN   • Succinylcholine Chloride 100 mg/5 mL syringe   Intravenous PRN    and PTA meds:    Medications Prior to Admission   Medication   • amLODIPine (NORVASC) 10 mg tablet   • famotidine (PEPCID) 20 mg tablet   • metoprolol succinate (TOPROL-XL) 25 mg 24 hr tablet   • Multiple Vitamin (DAILY VALUE MULTIVITAMIN) TABS   • predniSONE 10 mg tablet   • sodium bicarbonate 650 mg tablet   • alprostadil (EDEX) 20 MCG injection   • dronabinol (MARINOL) 5 MG capsule   • Potassium Citrate ER 15 MEQ (1620 MG) TBCR   • PROSTAGLANDIN PGE1 INJECTABLE 20 MCG/ML, STANDARD, IJ SOLN         No Known Allergies    Objective     Intake/Output Summary (Last 24 hours) at 2/12/2023 1621  Last data filed at 2/12/2023 1416  Gross per 24 hour   Intake 1050 ml Output --   Net 1050 ml       Invasive Devices:        Physical Exam      No intake/output data recorded  Vitals:    02/12/23 1400   BP: 127/60   Pulse: 75   Resp: 18   Temp:    SpO2: 95%       General Appearance:    No acute distress  Cooperative  Appears stated age  Head:    Normocephalic  Atraumatic  Normal jaw occlusion  Eyes:    Lids, conjunctiva normal  No scleral icterus  Ears:    Normal external ears  Nose:   Nares normal  No drainage  Mouth:   Lips, tongue normal  Mucosa normal  Phonation normal    Neck:   Supple  Symmetrical    Back:     Symmetric  No CVA tenderness  Lungs:     Normal respiratory effort  Clear to auscultation bilaterally  Chest wall:    No tenderness or deformity  Heart:    Regular rate and rhythm  Normal S1 and S2  No murmur  No JVD  No edema  Abdomen:     Soft  Non-tender  Bowel sounds active  Genitourinary:   No Elise catheter present  Extremities:   Extremities normal  Atraumatic  No cyanosis  Skin:   Warm and dry  No pallor, jaundice, rash, ecchymoses  Neurologic:   Alert and oriented to person, place, time  No focal deficit  Current Weight: Weight - Scale: 78 kg (172 lb)  First Weight: Weight - Scale: 78 kg (172 lb)    Lab Results:  I have personally reviewed pertinent labs      CBC:   Lab Results   Component Value Date    WBC 19 63 (H) 02/12/2023    HGB 16 2 02/12/2023    HCT 50 3 (H) 02/12/2023    MCV 91 02/12/2023     02/12/2023    MCH 29 3 02/12/2023    MCHC 32 2 02/12/2023    RDW 14 6 02/12/2023    MPV 8 8 (L) 02/12/2023    NRBC 0 02/12/2023     CMP:   Lab Results   Component Value Date    K 3 5 02/12/2023     02/12/2023    CO2 18 (L) 02/12/2023    BUN 47 (H) 02/12/2023    CREATININE 3 55 (H) 02/12/2023    CALCIUM 9 4 02/12/2023    AST 11 (L) 02/12/2023    ALT 10 02/12/2023    ALKPHOS 102 02/12/2023    EGFR 17 02/12/2023     Phosphorus: No results found for: PHOS  Magnesium: No results found for: MG  Urinalysis: No results found for: [place], SPECGRAV, PHUR, LEUKOCYTESUR, NITRITE, PROTEINUA, GLUCOSEU, KETONESU, BILIRUBINUR, BLOODU  Ionized Calcium: No results found for: CAION  Coagulation:   Lab Results   Component Value Date    INR 1 08 02/12/2023     Troponin: No results found for: TROPONINI  ABG: No results found for: PHART, PEG9XEP, PO2ART, WRK5MKQ, F6ZYQVRH, BEART, SOURCE    Results from last 7 days   Lab Units 02/12/23  1226   POTASSIUM mmol/L 3 5   CHLORIDE mmol/L 106   CO2 mmol/L 18*   BUN mg/dL 47*   CREATININE mg/dL 3 55*   CALCIUM mg/dL 9 4   ALK PHOS U/L 102   ALT U/L 10   AST U/L 11*       Radiology review:  Procedure: CT pelvis w contrast    Result Date: 2/12/2023  Narrative: CT PELVIS WITH IV CONTRAST INDICATION:   Large pilonidal sinus versus abscess, surgical planning  COMPARISON:  CT abdomen pelvis 6/30/2016  TECHNIQUE: CT examination of the pelvis was performed  Axial, sagittal, and coronal 2D reformatted images were created from the source data and submitted for interpretation  Radiation dose length product (DLP) for this visit:  407 mGy-cm   This examination, like all CT scans performed in the [facility], was performed utilizing techniques to minimize radiation dose exposure, including the use of iterative reconstruction and automated exposure control  IV Contrast:  100 mL of iohexol (OMNIPAQUE) Enteric Contrast:  Enteric contrast was not administered  FINDINGS: VISUALIZED KIDNEYS/URETERS:  Ileal conduit on the right  REPRODUCTIVE ORGANS:  Unremarkable for patient's age  URINARY BLADDER:  Surgically absent  APPENDIX:  No findings to suggest appendicitis  VISUALIZED BOWEL:  Right lower quadrant ostomy  ABDOMINOPELVIC CAVITY:  No ascites or free intraperitoneal air  No lymphadenopathy  VISUALIZED VESSELS:  The left external iliac artery appears nearly occluded proximally but appears patent distally    ABDOMINOPELVIC WALL/INGUINAL REGIONS: Large collection in the left posterior perineal and buttock region medially  Inferior portion of the collection measures 6 0 x 3 2 cm image 110 series 2  This is in close proximity to the anorectal junction  Associated skin thickening and infiltration  Posteriorly the skin thickening and inflammation extends adjacent to the distal coccyx  OSSEOUS STRUCTURES:  No acute fracture or destructive osseous lesion  Impression: 1  Large collection in the left posterior perineal and buttock region medially  This is in close proximity to the anorectal junction  This is most likely a perianal abscess  2   The left external iliac artery appears nearly occluded proximally but appears patent distally  The study was marked in [facility] for immediate notification  Workstation performed: Jerson Hernández DO      This consultation note was produced in part using a dictation device which may document imprecise wording from author's original intent

## 2023-02-12 NOTE — PLAN OF CARE
Problem: PAIN - ADULT  Goal: Verbalizes/displays adequate comfort level or baseline comfort level  Description: Interventions:  - Encourage patient to monitor pain and request assistance  - Assess pain using appropriate pain scale  - Administer analgesics based on type and severity of pain and evaluate response  - Implement non-pharmacological measures as appropriate and evaluate response  - Consider cultural and social influences on pain and pain management  - Notify physician/advanced practitioner if interventions unsuccessful or patient reports new pain  Outcome: Progressing     Problem: INFECTION - ADULT  Goal: Absence or prevention of progression during hospitalization  Description: INTERVENTIONS:  - Assess and monitor for signs and symptoms of infection  - Monitor lab/diagnostic results  - Monitor all insertion sites, i e  indwelling lines, tubes, and drains  - Monitor endotracheal if appropriate and nasal secretions for changes in amount and color  - Ethel appropriate cooling/warming therapies per order  - Administer medications as ordered  - Instruct and encourage patient and family to use good hand hygiene technique  - Identify and instruct in appropriate isolation precautions for identified infection/condition  Outcome: Progressing  Goal: Absence of fever/infection during neutropenic period  Description: INTERVENTIONS:  - Monitor WBC    Outcome: Progressing     Problem: SAFETY ADULT  Goal: Patient will remain free of falls  Description: INTERVENTIONS:  - Educate patient/family on patient safety including physical limitations  - Instruct patient to call for assistance with activity   - Consult OT/PT to assist with strengthening/mobility   - Keep Call bell within reach  - Keep bed low and locked with side rails adjusted as appropriate  - Keep care items and personal belongings within reach  - Initiate and maintain comfort rounds  - Make Fall Risk Sign visible to staff  - Offer Toileting every 2 Hours, in advance of need  - Initiate/Maintain bed/chair alarm  - Obtain necessary fall risk management equipment: bed in lowest and locked position, call bell within reach  - Apply yellow socks and bracelet for high fall risk patients  - Consider moving patient to room near nurses station  Outcome: Progressing  Goal: Maintain or return to baseline ADL function  Description: INTERVENTIONS:  -  Assess patient's ability to carry out ADLs; assess patient's baseline for ADL function and identify physical deficits which impact ability to perform ADLs (bathing, care of mouth/teeth, toileting, grooming, dressing, etc )  - Assess/evaluate cause of self-care deficits   - Assess range of motion  - Assess patient's mobility; develop plan if impaired  - Assess patient's need for assistive devices and provide as appropriate  - Encourage maximum independence but intervene and supervise when necessary  - Involve family in performance of ADLs  - Assess for home care needs following discharge   - Consider OT consult to assist with ADL evaluation and planning for discharge  - Provide patient education as appropriate  Outcome: Progressing  Goal: Maintains/Returns to pre admission functional level  Description: INTERVENTIONS:  - Perform BMAT or MOVE assessment daily    - Set and communicate daily mobility goal to care team and patient/family/caregiver  - Collaborate with rehabilitation services on mobility goals if consulted  - Perform Range of Motion 3 times a day  - Reposition patient every 2 hours    - Dangle patient 3 times a day  - Stand patient 3 times a day  - Ambulate patient 3 times a day  - Out of bed to chair 3 times a day   - Out of bed for meals 3 times a day  - Out of bed for toileting  - Record patient progress and toleration of activity level   Outcome: Progressing     Problem: DISCHARGE PLANNING  Goal: Discharge to home or other facility with appropriate resources  Description: INTERVENTIONS:  - Identify barriers to discharge w/patient and caregiver  - Arrange for needed discharge resources and transportation as appropriate  - Identify discharge learning needs (meds, wound care, etc )  - Arrange for interpretive services to assist at discharge as needed  - Refer to Case Management Department for coordinating discharge planning if the patient needs post-hospital services based on physician/advanced practitioner order or complex needs related to functional status, cognitive ability, or social support system  Outcome: Progressing     Problem: Knowledge Deficit  Goal: Patient/family/caregiver demonstrates understanding of disease process, treatment plan, medications, and discharge instructions  Description: Complete learning assessment and assess knowledge base    Interventions:  - Provide teaching at level of understanding  - Provide teaching via preferred learning methods  Outcome: Progressing

## 2023-02-12 NOTE — ANESTHESIA PREPROCEDURE EVALUATION
Procedure:  INCISION AND DRAINAGE (I&D) BUTTOCK, PERIANAL AND PILONIDAL ABSCESS (Buttocks)    Relevant Problems   No relevant active problems        Physical Exam    Airway    Mallampati score: II  TM Distance: >3 FB  Neck ROM: full     Dental       Cardiovascular      Pulmonary      Other Findings        Anesthesia Plan  ASA Score- 3 Emergent    Anesthesia Type- general with ASA Monitors  Additional Monitors:   Airway Plan: ETT  Comment: RSI prone          Plan Factors-Exercise tolerance (METS): >4 METS  Chart reviewed  Patient is not a current smoker  Patient did not smoke on day of surgery  Obstructive sleep apnea risk education given perioperatively  Induction- intravenous and rapid sequence induction  Postoperative Plan- Plan for postoperative opioid use  Planned trial extubation    Informed Consent- Anesthetic plan and risks discussed with patient  I personally reviewed this patient with the CRNA  Discussed and agreed on the Anesthesia Plan with the CRNA  Papi Simms Anesthesia plan and consent discussed with Jeff Shen who expressed understanding and agreement  Risks/benefits and alternatives discussed with patient including possible PONV, sore throat, damage to teeth/lips/gums and possibility of rare anesthetic and surgical emergencies

## 2023-02-12 NOTE — CONSULTS
[address] 1965, 59 y o  male MRN: [id number]  Unit/Bed#: -01 Encounter: [id number]  Primary Care Provider: Elieser Al DO   Date and time admitted to hospital: 2/12/2023 11:20 AM    Consult to internal medicine  Consult performed by: VANESA Carter  Consult ordered by: Diego Sandra PA-C          * Pilonidal disease  Assessment & Plan  Patient presented with worsening pilonidal and left perianal pain, swelling, erythema, and purulent drainage over the past 1 to 2 weeks  · He is status post I&D on 2/12/2023  · Treatment per primary team  · Follow-up with cultures  · Continue with cefepime and vancomycin    Primary hypertension  Assessment & Plan  · Due to soft BP prior to procedure, will hold off on amlodipine 10 mg daily and Toprol 25 mg daily from home for now  · May resume once BP has improved    History of bladder cancer  Assessment & Plan  · Status post cystectomy and ileal conduit  · Continue supportive care  · Monitor renal function closely    Polyarthralgia  Assessment & Plan  · Diagnosed approximately 1 year ago  · Follows with rheumatology at [facility]  · The patient was started on prednisone taper course by 1 mg every month  Currently at 1 mg daily    · Noted to have soft blood pressure readings in the OR  · The patient received 25 mg of Solu-Cortef prior to procedures  · We will give additional 25 mg IV of Solu-Cortef tonight and 50 mg in AM   · Transition to prednisone 1 mg on 2/14/2023      BALDEMAR (acute kidney injury) [facility])  Assessment & Plan  · Nephrology input appreciated  · Patient with chronic kidney disease stage IV with baseline creatinine between 2 9 to 3 mg/dL  · Presented with creatinine of 3 55 mg/dL  · BALDEMAR is likely due to acute infection   · Receiving IV fluids   · Avoid hypotension and nephrotoxins  · Monitor renal function closely        Recommendations for Discharge:  · Per Primary Service    Counseling / Coordination of Care Time: 45 minutes  Greater than 50% of total time spent on patient counseling and coordination of care  History of Present Illness:  Zechariah Gibson is a 59 y o  male who is originally admitted to general surgery service due to pilonidal abscess  We are consulted for medical management  The patient with past medical history of bladder cancer status post cystectomy and ileal conduit and chemotherapy in 2009, chronic kidney disease stage IV and polyarthralgia  The patient presented with 1 to 2 weeks history of worsening pilonidal and perianal swelling, erythema, and purulent drainage  On arrival to the ED, CAT scan shows a large collection of the left posterior perineal and buttock region suspected perianal abscess  Patient underwent I&D by surgery on 2/12/2023  Patient was seen and examined in his room  His sister is at bedside  That he is feeling much better compared to when he came in  He denies any further pressure in his rectal area  He denies any chest pain, dyspnea, nausea, vomiting, abdominal pain  Review of Systems:  Review of Systems   Constitutional: Positive for chills and fever  Negative for fatigue  HENT: Negative for congestion, ear pain, postnasal drip and sore throat  Eyes: Negative for discharge, itching and visual disturbance  Respiratory: Negative for cough, chest tightness and shortness of breath  Cardiovascular: Negative for chest pain, palpitations and leg swelling  Gastrointestinal: Positive for rectal pain  Negative for abdominal pain, nausea and vomiting  Endocrine: Negative for cold intolerance and heat intolerance  Genitourinary: Negative for difficulty urinating, dysuria and hematuria  Musculoskeletal: Negative for back pain, gait problem and neck pain  Skin: Positive for color change and wound  Negative for rash  Neurological: Positive for weakness  Negative for dizziness, speech difficulty, light-headedness and headaches  Psychiatric/Behavioral: Negative for confusion, decreased concentration and dysphoric mood  Past Medical and Surgical History:   Past Medical History:   Diagnosis Date   • 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    • Calculus of kidney    • Hyperlipidemia    • Hypertension    • Malignant neoplasm of ureteric orifice ([address] )    • Other male erectile dysfunction        Past Surgical History:   Procedure Laterality Date   • CYSTECTOMY W/ URETEROILEAL CONDUIT  2009       Meds/Allergies:  all medications and allergies reviewed    Allergies: No Known Allergies    Social History:     Marital Status: /Civil Union    Substance Use History:   Social History     Substance and Sexual Activity   Alcohol Use No     Social History     Tobacco Use   Smoking Status Every Day   • Packs/day: 0 50   • Types: Cigarettes   Smokeless Tobacco Never   Tobacco Comments    01/31/2022-- max 0 5pk a day     Social History     Substance and Sexual Activity   Drug Use No       Family History:  I have reviewed the patients family history    Physical Exam:   Vitals:   Blood Pressure: 121/75 (02/12/23 1733)  Pulse: 74 (02/12/23 1733)  Temperature: 98 1 °F (36 7 °C) (02/12/23 1658)  Temp Source: Temporal (02/12/23 1121)  Respirations: 19 (02/12/23 1733)  Height: 5' 9" (175 3 cm) (02/12/23 1121)  Weight - Scale: 78 kg (172 lb) (02/12/23 1121)  SpO2: 97 % (02/12/23 1733)    Physical Exam  Vitals and nursing note reviewed  Constitutional:       General: He is not in acute distress  Appearance: Normal appearance  HENT:      Head: Normocephalic and atraumatic  Right Ear: External ear normal       Left Ear: External ear normal       Nose: Nose normal       Mouth/Throat:      Mouth: Mucous membranes are moist       Pharynx: Oropharynx is clear  Eyes:      General:         Right eye: No discharge  Left eye: No discharge  Extraocular Movements: Extraocular movements intact        Pupils: Pupils are equal, round, and reactive to light  Cardiovascular:      Rate and Rhythm: Normal rate and regular rhythm  Pulses: Normal pulses  Heart sounds: Normal heart sounds  No murmur heard  Pulmonary:      Effort: Pulmonary effort is normal  No respiratory distress  Breath sounds: Normal breath sounds  No wheezing or rales  Abdominal:      General: Bowel sounds are normal  There is no distension  Palpations: Abdomen is soft  There is no mass  Tenderness: There is no abdominal tenderness  Musculoskeletal:         General: No swelling, tenderness or deformity  Normal range of motion  Cervical back: Normal range of motion and neck supple  No rigidity  Skin:     General: Skin is warm and dry  Capillary Refill: Capillary refill takes less than 2 seconds  Coloration: Skin is not pale  Findings: No erythema  Neurological:      General: No focal deficit present  Mental Status: He is alert and oriented to person, place, and time  Mental status is at baseline  Psychiatric:         Mood and Affect: Mood normal          Behavior: Behavior normal          Thought Content: Thought content normal          Judgment: Judgment normal          Additional Data:   Lab Results: I have personally reviewed pertinent reports        Results from last 7 days   Lab Units 02/12/23  1226   WBC Thousand/uL 19 63*   HEMOGLOBIN g/dL 16 2   HEMATOCRIT % 50 3*   PLATELETS Thousands/uL 369   NEUTROS PCT % 83*   LYMPHS PCT % 6*   MONOS PCT % 8   EOS PCT % 1     Results from last 7 days   Lab Units 02/12/23  1226   SODIUM mmol/L 135   POTASSIUM mmol/L 3 5   CHLORIDE mmol/L 106   CO2 mmol/L 18*   BUN mg/dL 47*   CREATININE mg/dL 3 55*   CALCIUM mg/dL 9 4   ALK PHOS U/L 102   ALT U/L 10   AST U/L 11*     Results from last 7 days   Lab Units 02/12/23  1226   INR  1 08         Lab Results   Component Value Date/Time    HGBA1C 5 5 01/29/2020 10:50 AM    HGBA1C 5 6 04/10/2018 10:46 AM           Imaging: I have personally reviewed pertinent reports  CT pelvis w contrast   Final Result by Ashanti Richards MD (02/12 4350)   1  Large collection in the left posterior perineal and buttock region medially  This is in close proximity to the anorectal junction  This is most likely a perianal abscess  2   The left external iliac artery appears nearly occluded proximally but appears patent distally  The study was marked in [facility] for immediate notification  Workstation performed: [id number]             EKG, Pathology, and Other Studies Reviewed on Admission:   · EKG: n/a     ** Please Note: This note has been constructed using a voice recognition system   **

## 2023-02-12 NOTE — PROGRESS NOTES
Megan Vargas is a 59 y o  male who is currently ordered Vancomycin IV with management by the Pharmacy Consult service  Relevant clinical data and objective / subjective history reviewed  Vancomycin Assessment:  Indication and Goal AUC/Trough: Soft tissue (goal -600, trough >10)  Clinical Status: stable  Micro:   pending  Renal Function:  SCr: 3 55 mg/dL  CrCl: 22 4 mL/min  Renal replacement: Not on dialysis, in BALDEMAR  Days of Therapy: 1  Vancomycin Plan:  New Dosin mg IV once, followed by pulse dosing 1000 mg iv once prn once random lvl below 15    Next Level: 0600 on 23  Renal Function Monitoring: Daily BMP and [place] will continue to follow closely for s/sx of nephrotoxicity, infusion reactions and appropriateness of therapy  BMP and CBC will be ordered per protocol  We will continue to follow the patient’s culture results and clinical progress daily      Kymberly Guerra, Pharmacist

## 2023-02-12 NOTE — ASSESSMENT & PLAN NOTE
· Status post cystectomy and ileal conduit  · Continue supportive care  · Monitor renal function closely

## 2023-02-12 NOTE — ASSESSMENT & PLAN NOTE
· Due to soft BP prior to procedure, will hold off on amlodipine 10 mg daily and Toprol 25 mg daily from home for now  · May resume once BP has improved

## 2023-02-12 NOTE — CONSULTS
Consultation - General Surgery   Leisa Mullen 59 y o  male MRN: [id number]  Unit/Bed#: TR 03 Encounter: [id number]    Assessment/Plan     57M prior bladder cancer status post chemotherapy, cystectomy, ileal conduit, additional chemotherapy 2009, known CKD4, now with acute kidney injury on top of known CKD, presenting with 1-2 weeks of worsening pilonidal and perianal infection, found to have extensive abscess, leukocytosis 19  Clinically stable  Needs urgent operative drainage, will send cultures  Informed consent obtained, last PO intake last night, discussed with OR and anesthesia  Patient to be admitted with medicine and with renal consultations  Has received vancomycin, cefepime, continuing on IV fluids  History of Present Illness     HPI:  Leisa Mullen is a 59 y o  male who presents with 1-2 weeks of worsening pilonidal and left perianal pain, swelling, erythema, purulent drainage  Some low grade temperatures at home, was trying warm soaks without relief  Prior spontaneously draining pilonidal abscess many years ago, intermittent flares, manages them at home, no prior procedures on the pilonidal or perianal region  Had bladder cancer, required chemotherapy, cystectomy with ileal conduit, additional chemo in [id number], follows for surveillance  Has significant acute on chronic kidney disease based on admission labs  WBC also 19, appears dehydrated  CT scan shows extensive left perianal abscess  Last PO intake last night  Consults    Review of Systems   Constitutional: Positive for chills  Report of low grade fever at home, afebrile here   Genitourinary:        History of bladder cancer    Acute on chronic kidney disease   Musculoskeletal:        Polymyalgia rheumatica on slow prednisone wean, remains on prednisone 2mg daily   Skin: Positive for color change and wound  Significant pilonidal and perianal infection with abscess   All other systems reviewed and are negative        Historical Information   Past Medical History:   Diagnosis Date   • 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    • Calculus of kidney    • Hyperlipidemia    • Hypertension    • Malignant neoplasm of ureteric orifice ([address] )    • Other male erectile dysfunction      Past Surgical History:   Procedure Laterality Date   • CYSTECTOMY W/ URETEROILEAL CONDUIT  2009     Social History   Social History     Substance and Sexual Activity   Alcohol Use No     Social History     Substance and Sexual Activity   Drug Use No     Social History     Tobacco Use   Smoking Status Every Day   • Packs/day: 0 50   • Types: Cigarettes   Smokeless Tobacco Never   Tobacco Comments    01/31/2022-- max 0 5pk a day     Family History: non-contributory    Meds/Allergies   all current active meds have been reviewed  No Known Allergies    Objective   First Vitals:   Blood Pressure: 143/66 (02/12/23 1121)  Pulse: 93 (02/12/23 1121)  Temperature: 98 9 °F (37 2 °C) (02/12/23 1121)  Temp Source: Temporal (02/12/23 1121)  Respirations: 18 (02/12/23 1121)  Height: 5' 9" (175 3 cm) (02/12/23 1121)  Weight - Scale: 78 kg (172 lb) (02/12/23 1121)  SpO2: 98 % (02/12/23 1125)    Current Vitals:   Blood Pressure: 127/60 (02/12/23 1400)  Pulse: 75 (02/12/23 1400)  Temperature: 98 9 °F (37 2 °C) (02/12/23 1121)  Temp Source: Temporal (02/12/23 1121)  Respirations: 18 (02/12/23 1400)  Height: 5' 9" (175 3 cm) (02/12/23 1121)  Weight - Scale: 78 kg (172 lb) (02/12/23 1121)  SpO2: 95 % (02/12/23 1400)      Intake/Output Summary (Last 24 hours) at 2/12/2023 1502  Last data filed at 2/12/2023 1416  Gross per 24 hour   Intake 1050 ml   Output --   Net 1050 ml       Invasive Devices     Peripheral Intravenous Line  Duration           Peripheral IV 02/12/23 Left Antecubital <1 day                Physical Exam  Vitals reviewed  Constitutional:       General: He is not in acute distress  Appearance: He is not toxic-appearing  HENT:      Head: Normocephalic  Mouth/Throat:      Mouth: Mucous membranes are dry  Eyes:      Pupils: Pupils are equal, round, and reactive to light  Cardiovascular:      Rate and Rhythm: Normal rate  Pulmonary:      Effort: Pulmonary effort is normal  No respiratory distress  Abdominal:      Comments: Soft nondistended nontender urostomy in place with clear urine   Genitourinary:     Comments: Significant pilonidal disease and acute abscess at the top of the gluteal cleft extending down the left buttock just lateral to the gluteal cleft along the left buttock in the perianal region, significant induration, fluctuance, erythema, warmth, some scant spontaneous purulent drainage, area is tender, rectal exam deferred until exam under anesthesia  Musculoskeletal:         General: Normal range of motion  Cervical back: Normal range of motion  Skin:     General: Skin is warm and dry  Capillary Refill: Capillary refill takes less than 2 seconds  Neurological:      General: No focal deficit present  Mental Status: He is alert  Psychiatric:         Mood and Affect: Mood normal          Lab Results:   I have personally reviewed pertinent lab results  , CBC:   Lab Results   Component Value Date    WBC 19 63 (H) 02/12/2023    HGB 16 2 02/12/2023    HCT 50 3 (H) 02/12/2023    MCV 91 02/12/2023     02/12/2023    MCH 29 3 02/12/2023    MCHC 32 2 02/12/2023    RDW 14 6 02/12/2023    MPV 8 8 (L) 02/12/2023    NRBC 0 02/12/2023   , CMP:   Lab Results   Component Value Date    SODIUM 135 02/12/2023    K 3 5 02/12/2023     02/12/2023    CO2 18 (L) 02/12/2023    BUN 47 (H) 02/12/2023    CREATININE 3 55 (H) 02/12/2023    CALCIUM 9 4 02/12/2023    AST 11 (L) 02/12/2023    ALT 10 02/12/2023    ALKPHOS 102 02/12/2023    EGFR 17 02/12/2023     Imaging: I have personally reviewed pertinent reports  and I have personally reviewed pertinent films in PACS    Signature:   Margie Nair MD  Date: 2/12/2023 Time: 3:02 PM

## 2023-02-12 NOTE — ASSESSMENT & PLAN NOTE
· Nephrology input appreciated  · Patient with chronic kidney disease stage IV with baseline creatinine between 2 9 to 3 mg/dL  · Presented with creatinine of 3 55 mg/dL  · BALDEMAR is likely due to acute infection   · Receiving IV fluids   · Avoid hypotension and nephrotoxins  · Monitor renal function closely

## 2023-02-12 NOTE — OP NOTE
OPERATIVE REPORT  PATIENT NAME: Sky Ellis    :  1965  MRN: [id number]  Pt Location: [facility] OR ROOM 02    SURGERY DATE: 2023    Surgeon(s) and Role:     Johanna Chapa MD - Primary     * Sam Ramires PA-C - Assisting    Preop Diagnosis:  BALDEMAR (acute kidney injury) ([facility] [address] ) [N17 9]  Perianal abscess [K61 0]  Pilonidal disease [L98 8]    Post-Op Diagnosis Codes:     * BALDEMAR (acute kidney injury) ([facility] [address] ) [N17 9]     * Perianal abscess [K61 0]     * Pilonidal disease [L98 8]    Procedure(s):  INCISION AND DRAINAGE (I&D) BUTTOCK  PERIANAL AND PILONIDAL ABSCESS    Specimen(s):  ID Type Source Tests Collected by Time Destination   A : tissue for culture Wound Sacrum WOUND CULTURE Silvio Prasad MD 2023 1632    B :  Wound Sacrum ANAEROBIC CULTURE AND GRAM STAIN, WOUND CULTURE Silvio Prasad MD 2023 1633        Estimated Blood Loss:   50cc    Anesthesia Type:   General  Local    Operative Indications:  BALDEMAR (acute kidney injury) ([facility] [address] ) [N17 9]  Perianal abscess [K61 0]  Pilonidal disease [L98 8]  57M with history of bladder cancer, BALDEMAR on CKD, now with extensive pilonidal and left perianal abscess, antibiotics and fluids given  Consented for urgent operative drainage  Operative Findings:  -Copious purulence within deep abscess cavity in the left perianal region extending up and including complex pilonidal disease at the top of the gluteal cleft in the midline and slightly to the right of midline  -Incision and cavity approximately 15cm long, 4cm wide, 5cm deep, 3cm tunnel inferiorally   -Cultures sent both fluid and tissue cultures sent  -3L of pulse lavage performed  -Betadine soaked kerlex pack to the cavity with overlying dry dressing    Complications:   None    Procedure and Technique:  The patient was taken to the operating room and general anesthesia induced and intubated on the stretcher  Pneumoboots on and activated, heparin prophylaxis and antibiotics given   Patient placed carefully in the prone position on the operating room table, padded and secured well to the bed  Perineum and gluteal region prepped with betadine after the area was shaved and silk tape was placed on either side for retraction  Surgical timeout performed  Extensive incision made overlying the left perianal area of induration, and fluctuance with return of copious purulence  Purulence sent for culture  Incision extended deeply and area probed aggressively into multiple abscess pockets, soft tissue opened widely to allow complete drainage  Part of the infection continued onto the midline at the top of the gluteal cleft into the area of pilonidal and the incision was continued up the entire left side of the gluteal cleft along the buttock and into the pilonidal disease  Some of the extensively scarred skin and infected tissue was excised as well and sent for tissue culture  Entire area was copiously irrigated with 3L pulse lavage until all pus drained and effluent clear  Hemostasis achieved with electrocautery  30cc of 0 5% marcaine plain were instilled into the wound  Cavity packed tightly with betadine soaked kerlex and overlying fluffs and abdominal pads placed with surgical underwear  Counts were correct  Patient repositioned onto the stretcher, extubated, taken to PACU in stable condition, tolerated well  I was present for the entire procedure and a physician assistant was required during the procedure for retraction, tissue handling,dissection and suturing      Patient Disposition:  PACU       SIGNATURE: Thanh Reyes MD  DATE: February 12, 2023  TIME: 4:58 PM

## 2023-02-12 NOTE — ED PROVIDER NOTES
History  Chief Complaint   Patient presents with   • Abscess     Pt reports a "cyst" on her left buttock  Pt reports it has been going on for three days  29-year-old male presents to the emergency department for evaluation of gluteal abscess  Patient reports having a distant history of a pilonidal abscess in the late 80s  Patient states she has never had surgery for pilonidal abscess  Patient is on chronic steroids  Patient has a history of urinary cancer  Patient states that he has noted some drainage in the area of the gluteal cleft and severe gluteal tenderness  Patient does endorse having some subjective fevers chills at home and states that he has been taking Tylenol for pain  He is accompanied by his wife  This abscess has likely been present for at least 1 week  Allergies reviewed              Prior to Admission Medications   Prescriptions Last Dose Informant Patient Reported? Taking? Multiple Vitamin (DAILY VALUE MULTIVITAMIN) TABS 2023  Yes Yes   Sig: Take 1 tablet by mouth daily   PROSTAGLANDIN PGE1 INJECTABLE 20 MCG/ML, STANDARD, IJ SOLN Not Taking  No No   Sig: Inject 0 3mL to 1 0mL IC 20 minutes prior to intercourse  Patient not taking: Reported on 2020   Potassium Citrate ER 15 MEQ (1620 MG) TBCR Not Taking  Yes No   Sig: Take by mouth   Patient not taking: Reported on 2022   alprostadil (EDEX) 20 MCG injection Unknown  No No   Si mcg by Intracavitary route as needed for erectile dysfunction   amLODIPine (NORVASC) 10 mg tablet 2023  No Yes   Sig: TAKE 1 TABLET BY MOUTH EVERY DAY   dronabinol (MARINOL) 5 MG capsule   No No   Sig: TAKE 1 CAPSULE BY MOUTH EVERY DAY   famotidine (PEPCID) 20 mg tablet 2023  No Yes   Sig: TAKE 1 TABLET BY MOUTH TWICE A DAY   metoprolol succinate (TOPROL-XL) 25 mg 24 hr tablet 2023  No Yes   Sig: TAKE 1 TABLET (25 MG TOTAL) BY MOUTH DAILY     predniSONE 10 mg tablet 2023  No Yes   Sig: TAKE 1 TABLET BY MOUTH EVERY DAY sodium bicarbonate 650 mg tablet Past Month  Yes Yes   Sig: Take by mouth      Facility-Administered Medications: None       Past Medical History:   Diagnosis Date   • 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    • Calculus of kidney    • Hyperlipidemia    • Hypertension    • Malignant neoplasm of ureteric orifice (HCC)    • Other male erectile dysfunction        Past Surgical History:   Procedure Laterality Date   • CYSTECTOMY W/ URETEROILEAL CONDUIT  2009       Family History   Problem Relation Age of Onset   • Aneurysm Mother    • Heart failure Father      I have reviewed and agree with the history as documented  E-Cigarette/Vaping   • E-Cigarette Use Never User      E-Cigarette/Vaping Substances     Social History     Tobacco Use   • Smoking status: Every Day     Packs/day: 0 50     Types: Cigarettes   • Smokeless tobacco: Never   • Tobacco comments:     01/31/2022-- max 0 5pk a day   Vaping Use   • Vaping Use: Never used   Substance Use Topics   • Alcohol use: Not Currently   • Drug use: No       Review of Systems   Constitutional: Positive for fatigue and fever  Negative for chills  HENT: Negative for congestion, ear pain, rhinorrhea, sinus pressure, sneezing and sore throat  Eyes: Negative for pain and discharge  Respiratory: Negative for cough, choking, chest tightness, shortness of breath and wheezing  Cardiovascular: Negative for chest pain and palpitations  Gastrointestinal: Negative for abdominal pain, constipation, diarrhea, nausea and vomiting  Genitourinary: Negative for difficulty urinating and dysuria  Musculoskeletal: Negative for back pain, gait problem, neck pain and neck stiffness  Neurological: Negative for dizziness, light-headedness and headaches  All other systems reviewed and are negative  Physical Exam  Physical Exam  Vitals and nursing note reviewed  Constitutional:       General: He is not in acute distress  Appearance: He is well-developed   He is not ill-appearing  HENT:      Head: Normocephalic and atraumatic  Right Ear: External ear normal       Left Ear: External ear normal       Nose: Nose normal    Eyes:      Pupils: Pupils are equal, round, and reactive to light  Cardiovascular:      Rate and Rhythm: Normal rate and regular rhythm  Heart sounds: Normal heart sounds  No murmur heard  No friction rub  No gallop  Pulmonary:      Effort: Pulmonary effort is normal  No respiratory distress  Breath sounds: Normal breath sounds  No stridor  No wheezing or rales  Abdominal:      General: Bowel sounds are normal  There is no distension  Palpations: Abdomen is soft  Tenderness: There is no abdominal tenderness  There is no guarding  Genitourinary:     Comments: Large pilonidal sinus with tracks noted and purulent drainage from the gluteal cleft  Large area of induration  Concern for possible perianal extension  Musculoskeletal:         General: No tenderness  Normal range of motion  Cervical back: Normal range of motion and neck supple  Skin:     General: Skin is warm  Capillary Refill: Capillary refill takes less than 2 seconds  Neurological:      Mental Status: He is alert and oriented to person, place, and time  Psychiatric:         Behavior: Behavior is cooperative               Vital Signs  ED Triage Vitals   Temperature Pulse Respirations Blood Pressure SpO2   02/12/23 1121 02/12/23 1121 02/12/23 1121 02/12/23 1121 02/12/23 1125   98 9 °F (37 2 °C) 93 18 143/66 98 %      Temp Source Heart Rate Source Patient Position - Orthostatic VS BP Location FiO2 (%)   02/12/23 1121 02/12/23 1121 02/12/23 1121 02/12/23 1121 --   Temporal Monitor Lying Right arm       Pain Score       02/12/23 1121       8           Vitals:    02/12/23 1710 02/12/23 1720 02/12/23 1733 02/12/23 1830   BP: 119/71 122/70 121/75 107/60   Pulse: 69 72 74 78   Patient Position - Orthostatic VS:             Visual Acuity      ED Medications  Medications   multi-electrolyte (PLASMALYTE-A/ISOLYTE-S PH 7 4) IV solution (150 mL/hr Intravenous New Bag 2/12/23 1759)   HYDROmorphone (DILAUDID) injection 0 5 mg (has no administration in time range)   acetaminophen (TYLENOL) tablet 650 mg (has no administration in time range)   oxyCODONE (ROXICODONE) IR tablet 5 mg (has no administration in time range)   nicotine (NICODERM CQ) 14 mg/24hr TD 24 hr patch 1 patch (1 patch Transdermal Not Given 2/12/23 1750)   ondansetron (ZOFRAN) injection 4 mg (has no administration in time range)   heparin (porcine) subcutaneous injection 5,000 Units (5,000 Units Subcutaneous Not Given 2/12/23 1749)   vancomycin (VANCOCIN) IVPB (premix in dextrose) 1,000 mg 200 mL (has no administration in time range)   cefepime (MAXIPIME) 500 mg in sodium chloride 0 9 % 50 mL IVPB (has no administration in time range)   predniSONE tablet 1 mg (has no administration in time range)   hydrocortisone (Solu-CORTEF) injection 25 mg (has no administration in time range)   hydrocortisone (Solu-CORTEF) injection 50 mg (has no administration in time range)   cefepime (MAXIPIME) IVPB (premix in dextrose) 2,000 mg 50 mL (0 mg Intravenous Stopped 2/12/23 1252)   vancomycin (VANCOCIN) 1,250 mg in sodium chloride 0 9 % 250 mL IVPB (1,250 mg Intravenous New Bag 2/12/23 1253)   HYDROmorphone (DILAUDID) injection 0 5 mg (0 5 mg Intravenous Given 2/12/23 1233)   sodium chloride 0 9 % bolus 1,000 mL (0 mL Intravenous Stopped 2/12/23 1416)   HYDROmorphone (DILAUDID) injection 0 5 mg (0 5 mg Intravenous Given 2/12/23 1352)   iohexol (OMNIPAQUE) 350 MG/ML injection (SINGLE-DOSE) 100 mL (100 mL Intravenous Given 2/12/23 1425)       Diagnostic Studies  Results Reviewed     Procedure Component Value Units Date/Time    Comprehensive metabolic panel [[id number]]  (Abnormal) Collected: 02/12/23 1226    Lab Status: Final result Specimen: Blood from Arm, Left Updated: 02/12/23 1338     Sodium 135 mmol/L Potassium 3 5 mmol/L      Chloride 106 mmol/L      CO2 18 mmol/L      ANION GAP 11 mmol/L      BUN 47 mg/dL      Creatinine 3 55 mg/dL      Glucose 108 mg/dL      Calcium 9 4 mg/dL      AST 11 U/L      ALT 10 U/L      Alkaline Phosphatase 102 U/L      Total Protein 8 0 g/dL      Albumin 3 8 g/dL      Total Bilirubin 0 46 mg/dL      eGFR 17 ml/min/1 73sq m     Narrative:      [place] guidelines for Chronic Kidney Disease (CKD):   •  Stage 1 with normal or high GFR (GFR > 90 mL/min/1 73 square meters)  •  Stage 2 Mild CKD (GFR = 60-89 mL/min/1 73 square meters)  •  Stage 3A Moderate CKD (GFR = 45-59 mL/min/1 73 square meters)  •  Stage 3B Moderate CKD (GFR = 30-44 mL/min/1 73 square meters)  •  Stage 4 Severe CKD (GFR = 15-29 mL/min/1 73 square meters)  •  Stage 5 End Stage CKD (GFR <15 mL/min/1 73 square meters)  Note: GFR calculation is accurate only with a steady state creatinine    Procalcitonin [[id number]]  (Abnormal) Collected: 02/12/23 1226    Lab Status: Final result Specimen: Blood from Arm, Left Updated: 02/12/23 1338     Procalcitonin 0 49 ng/ml     Lactic acid [[id number]]  (Normal) Collected: 02/12/23 1226    Lab Status: Final result Specimen: Blood from Arm, Left Updated: 02/12/23 1333     LACTIC ACID 0 7 mmol/L     Narrative:      Result may be elevated if tourniquet was used during collection      [facility] [[id number]]  (Normal) Collected: 02/12/23 1226    Lab Status: Final result Specimen: Blood from Arm, Left Updated: 02/12/23 1324     Protime 14 0 seconds      INR 1 08    APTT [[id number]]  (Normal) Collected: 02/12/23 1226    Lab Status: Final result Specimen: Blood from Arm, Left Updated: 02/12/23 1324     PTT 31 seconds     CBC and differential [[id number]]  (Abnormal) Collected: 02/12/23 1226    Lab Status: Final result Specimen: Blood from Arm, Left Updated: 02/12/23 1311     WBC 19 63 Thousand/uL      RBC 5 53 Million/uL      Hemoglobin 16 2 g/dL      Hematocrit 50 3 % MCV 91 fL      MCH 29 3 pg      MCHC 32 2 g/dL      RDW 14 6 %      MPV 8 8 fL      Platelets 250 Thousands/uL      nRBC 0 /100 WBCs      Neutrophils Relative 83 %      Immat GRANS % 1 %      Lymphocytes Relative 6 %      Monocytes Relative 8 %      Eosinophils Relative 1 %      Basophils Relative 1 %      Neutrophils Absolute 16 65 Thousands/µL      Immature Grans Absolute 0 13 Thousand/uL      Lymphocytes Absolute 1 08 Thousands/µL      Monocytes Absolute 1 48 Thousand/µL      Eosinophils Absolute 0 20 Thousand/µL      Basophils Absolute 0 09 Thousands/µL     Blood culture #2 [[id number]] Collected: 02/12/23 1226    Lab Status: In process Specimen: Blood from Arm, Right Updated: 02/12/23 1309    Blood culture #1 [[id number]] Collected: 02/12/23 1226    Lab Status: In process Specimen: Blood from Arm, Left Updated: 02/12/23 1309    UA w Reflex to Microscopic w Reflex to Culture [[id number]]     Lab Status: No result Specimen: Urine                  CT pelvis w contrast   Final Result by Bon Jones MD (02/12 6242)   1  Large collection in the left posterior perineal and buttock region medially  This is in close proximity to the anorectal junction  This is most likely a perianal abscess  2   The left external iliac artery appears nearly occluded proximally but appears patent distally  The study was marked in [facility] for immediate notification  Workstation performed: [id number]                    Procedures  Procedures         ED Course  ED Course as of 02/12/23 1909   Viamarilis Stinsonsimone Feb 12, 2023   1206 General surgery notified   [phone] Risk-benefit discussion held with patient regarding CAT scan with contrast   Patient agreeable to proceed with contrast   1348 Contrast use discussed with Dr Gagan Pelayo  Ok to proceed with contrast                                               Medical Decision Making  Impressive abscess of the gluteal region with concern for perianal extension    General surgery was contacted regarding this patient  Patient be admitted to the general surgery service  Patient to proceed to the OR for operative management  Patient is on chronic steroids  Leukocytosis with elevated procalcitonin and BALDEMAR superimposed on CKD  Patient hydrated antibiotics initiated  BALDEMAR (acute kidney injury) ([place] [address] ): self-limited or minor problem  Amount and/or Complexity of Data Reviewed  Labs: ordered  Radiology: ordered  Risk  Prescription drug management  Decision regarding hospitalization  Disposition  Final diagnoses:   BALDEMAR (acute kidney injury) ([place] [address] )     Time reflects when diagnosis was documented in both MDM as applicable and the Disposition within this note     Time User Action Codes Description Comment    2/12/2023  2:05 PM Will Queener Add [N17 9] BALDEMAR (acute kidney injury) ([place] [address] )     2/12/2023  3:01 PM Evangelina Camera Add [K61 0] Perianal abscess     2/12/2023  3:01 PM Evangelina Camera Add [L98 8] Pilonidal disease     2/12/2023  4:32 PM Purvi Mayans E Modify [N17 9] BALDEMAR (acute kidney injury) ([place] [address] )     2/12/2023  4:32 PM Yelena Lute [K61 0] Perianal abscess     2/12/2023  4:32 PM Purvi Mayans E Modify [L98 8] Pilonidal disease       ED Disposition     ED Disposition   Admit    Condition   Stable    Date/Time   Sun Feb 12, 2023  3:38 PM    Comment   Case was discussed with Dr Nery Ballesteros and the patient's admission status was agreed to be Admission Status: inpatient status to the service of Dr Nery Ballesteros   Follow-up Information    None         Current Discharge Medication List      CONTINUE these medications which have NOT CHANGED    Details   amLODIPine (NORVASC) 10 mg tablet TAKE 1 TABLET BY MOUTH EVERY DAY  Qty: 90 tablet, Refills: 1    Associated Diagnoses: Secondary hypertension      famotidine (PEPCID) 20 mg tablet TAKE 1 TABLET BY MOUTH TWICE A DAY  Qty: 60 tablet, Refills: 2    Comments: DX Code Needed      Associated Diagnoses: Polyarthralgia      metoprolol succinate (TOPROL-XL) 25 mg 24 hr tablet TAKE 1 TABLET (25 MG TOTAL) BY MOUTH DAILY  Qty: 90 tablet, Refills: 1    Associated Diagnoses: Secondary hypertension      Multiple Vitamin (DAILY VALUE MULTIVITAMIN) TABS Take 1 tablet by mouth daily      predniSONE 10 mg tablet TAKE 1 TABLET BY MOUTH EVERY DAY  Qty: 30 tablet, Refills: 0    Associated Diagnoses: Polyarthralgia      sodium bicarbonate 650 mg tablet Take by mouth      alprostadil (EDEX) 20 MCG injection 20 mcg by Intracavitary route as needed for erectile dysfunction  Qty: 5 vial, Refills: 6    Associated Diagnoses: Other male erectile dysfunction      dronabinol (MARINOL) 5 MG capsule TAKE 1 CAPSULE BY MOUTH EVERY DAY  Qty: 90 capsule, Refills: 0    Comments: This request is for a new prescription for a controlled substance as required by Federal/State law  DX Code Needed    Associated Diagnoses: Malignant neoplasm of ureteric orifice (HCC)      Potassium Citrate ER 15 MEQ (1620 MG) TBCR Take by mouth      PROSTAGLANDIN PGE1 INJECTABLE 20 MCG/ML, STANDARD, IJ SOLN Inject 0 3mL to 1 0mL IC 20 minutes prior to intercourse  Qty: 10 mL, Refills: 0    Comments: Script called into [facility]; verbal order given to Jose Argueta CPhT  Associated Diagnoses: Other male erectile dysfunction             No discharge procedures on file      PDMP Review     None          ED Provider  Electronically Signed by           Serina Dennis PA-C  02/12/23 1910

## 2023-02-12 NOTE — ASSESSMENT & PLAN NOTE
· Diagnosed approximately 1 year ago  · Follows with rheumatology at [facility]  · The patient was started on prednisone taper course by 1 mg every month  Currently at 1 mg daily    · Noted to have soft blood pressure readings in the OR  · The patient received 25 mg of Solu-Cortef prior to procedures  · We will give additional 25 mg IV of Solu-Cortef tonight and 50 mg in AM   · Transition to prednisone 1 mg on 2/14/2023

## 2023-02-12 NOTE — ASSESSMENT & PLAN NOTE
Patient presented with worsening pilonidal and left perianal pain, swelling, erythema, and purulent drainage over the past 1 to 2 weeks    · He is status post I&D on 2/12/2023  · Treatment per primary team  · Follow-up with cultures  · Continue with cefepime and vancomycin

## 2023-02-13 LAB
ALBUMIN SERPL BCP-MCNC: 3.1 G/DL (ref 3.5–5)
ALP SERPL-CCNC: 80 U/L (ref 34–104)
ALT SERPL W P-5'-P-CCNC: 8 U/L (ref 7–52)
ANION GAP SERPL CALCULATED.3IONS-SCNC: 10 MMOL/L (ref 4–13)
AST SERPL W P-5'-P-CCNC: 8 U/L (ref 13–39)
BACTERIA UR QL AUTO: ABNORMAL /HPF
BASE EX.OXY STD BLDV CALC-SCNC: 96.8 % (ref 60–80)
BASE EXCESS BLDV CALC-SCNC: -6 MMOL/L
BASOPHILS # BLD AUTO: 0.06 THOUSANDS/ÂΜL (ref 0–0.1)
BASOPHILS NFR BLD AUTO: 0 % (ref 0–1)
BETA-HYDROXYBUTYRATE: 0.1 MMOL/L
BILIRUB SERPL-MCNC: 0.29 MG/DL (ref 0.2–1)
BILIRUB UR QL STRIP: NEGATIVE
BUN SERPL-MCNC: 42 MG/DL (ref 5–25)
CALCIUM ALBUM COR SERPL-MCNC: 9.2 MG/DL (ref 8.3–10.1)
CALCIUM SERPL-MCNC: 8.5 MG/DL (ref 8.4–10.2)
CHLORIDE SERPL-SCNC: 109 MMOL/L (ref 96–108)
CLARITY UR: CLEAR
CO2 SERPL-SCNC: 16 MMOL/L (ref 21–32)
COLOR UR: ABNORMAL
CREAT SERPL-MCNC: 2.94 MG/DL (ref 0.6–1.3)
EOSINOPHIL # BLD AUTO: 0.05 THOUSAND/ÂΜL (ref 0–0.61)
EOSINOPHIL NFR BLD AUTO: 0 % (ref 0–6)
ERYTHROCYTE [DISTWIDTH] IN BLOOD BY AUTOMATED COUNT: 14.7 % (ref 11.6–15.1)
FINE GRAN CASTS URNS QL MICRO: ABNORMAL /LPF
GFR SERPL CREATININE-BSD FRML MDRD: 22 ML/MIN/1.73SQ M
GLUCOSE SERPL-MCNC: 144 MG/DL (ref 65–140)
GLUCOSE UR STRIP-MCNC: NEGATIVE MG/DL
HCO3 BLDV-SCNC: 19.4 MMOL/L (ref 24–30)
HCT VFR BLD AUTO: 41.8 % (ref 36.5–49.3)
HGB BLD-MCNC: 13.4 G/DL (ref 12–17)
HGB UR QL STRIP.AUTO: ABNORMAL
IMM GRANULOCYTES # BLD AUTO: 0.11 THOUSAND/UL (ref 0–0.2)
IMM GRANULOCYTES NFR BLD AUTO: 1 % (ref 0–2)
KETONES UR STRIP-MCNC: NEGATIVE MG/DL
LEUKOCYTE ESTERASE UR QL STRIP: ABNORMAL
LYMPHOCYTES # BLD AUTO: 1.3 THOUSANDS/ÂΜL (ref 0.6–4.47)
LYMPHOCYTES NFR BLD AUTO: 9 % (ref 14–44)
MAGNESIUM SERPL-MCNC: 2.3 MG/DL (ref 1.9–2.7)
MCH RBC QN AUTO: 28.9 PG (ref 26.8–34.3)
MCHC RBC AUTO-ENTMCNC: 32.1 G/DL (ref 31.4–37.4)
MCV RBC AUTO: 90 FL (ref 82–98)
MONOCYTES # BLD AUTO: 0.93 THOUSAND/ÂΜL (ref 0.17–1.22)
MONOCYTES NFR BLD AUTO: 7 % (ref 4–12)
NEUTROPHILS # BLD AUTO: 11.74 THOUSANDS/ÂΜL (ref 1.85–7.62)
NEUTS SEG NFR BLD AUTO: 83 % (ref 43–75)
NITRITE UR QL STRIP: NEGATIVE
NON-SQ EPI CELLS URNS QL MICRO: ABNORMAL /HPF
NRBC BLD AUTO-RTO: 0 /100 WBCS
O2 CT BLDV-SCNC: 18.3 ML/DL
PCO2 BLDV: 38.3 MM HG (ref 42–50)
PH BLDV: 7.32 [PH] (ref 7.3–7.4)
PH UR STRIP.AUTO: 7 [PH]
PHOSPHATE SERPL-MCNC: 3.7 MG/DL (ref 2.7–4.5)
PLATELET # BLD AUTO: 335 THOUSANDS/UL (ref 149–390)
PMV BLD AUTO: 8.8 FL (ref 8.9–12.7)
PO2 BLDV: 123.8 MM HG (ref 35–45)
POTASSIUM SERPL-SCNC: 3.5 MMOL/L (ref 3.5–5.3)
PROT SERPL-MCNC: 6.2 G/DL (ref 6.4–8.4)
PROT UR STRIP-MCNC: NEGATIVE MG/DL
RBC # BLD AUTO: 4.63 MILLION/UL (ref 3.88–5.62)
RBC #/AREA URNS AUTO: ABNORMAL /HPF
SODIUM SERPL-SCNC: 135 MMOL/L (ref 135–147)
SP GR UR STRIP.AUTO: 1.01
UROBILINOGEN UR QL STRIP.AUTO: 0.2 E.U./DL
VANCOMYCIN SERPL-MCNC: 9.9 UG/ML (ref 10–20)
WBC # BLD AUTO: 14.19 THOUSAND/UL (ref 4.31–10.16)
WBC #/AREA URNS AUTO: ABNORMAL /HPF

## 2023-02-13 RX ORDER — DOCUSATE SODIUM 100 MG/1
100 CAPSULE, LIQUID FILLED ORAL DAILY
Status: DISCONTINUED | OUTPATIENT
Start: 2023-02-13 | End: 2023-02-13

## 2023-02-13 RX ORDER — POLYETHYLENE GLYCOL 3350 17 G/17G
17 POWDER, FOR SOLUTION ORAL DAILY
Status: DISCONTINUED | OUTPATIENT
Start: 2023-02-13 | End: 2023-02-15 | Stop reason: HOSPADM

## 2023-02-13 RX ORDER — SODIUM BICARBONATE 650 MG/1
1300 TABLET ORAL
Status: DISCONTINUED | OUTPATIENT
Start: 2023-02-13 | End: 2023-02-15 | Stop reason: HOSPADM

## 2023-02-13 RX ORDER — METOPROLOL SUCCINATE 25 MG/1
25 TABLET, EXTENDED RELEASE ORAL DAILY
Status: DISCONTINUED | OUTPATIENT
Start: 2023-02-14 | End: 2023-02-15 | Stop reason: HOSPADM

## 2023-02-13 RX ORDER — VANCOMYCIN HYDROCHLORIDE 1 G/200ML
1000 INJECTION, SOLUTION INTRAVENOUS ONCE
Status: COMPLETED | OUTPATIENT
Start: 2023-02-13 | End: 2023-02-13

## 2023-02-13 RX ADMIN — HEPARIN SODIUM 5000 UNITS: 5000 INJECTION INTRAVENOUS; SUBCUTANEOUS at 05:02

## 2023-02-13 RX ADMIN — DOCUSATE SODIUM 100 MG: 100 CAPSULE, LIQUID FILLED ORAL at 12:27

## 2023-02-13 RX ADMIN — HYDROMORPHONE HYDROCHLORIDE 0.5 MG: 1 INJECTION, SOLUTION INTRAMUSCULAR; INTRAVENOUS; SUBCUTANEOUS at 12:27

## 2023-02-13 RX ADMIN — SODIUM BICARBONATE 1300 MG: 650 TABLET ORAL at 10:58

## 2023-02-13 RX ADMIN — CEFEPIME HYDROCHLORIDE 500 MG: 1 INJECTION, POWDER, FOR SOLUTION INTRAMUSCULAR; INTRAVENOUS at 01:04

## 2023-02-13 RX ADMIN — Medication 1 PACKET: at 12:27

## 2023-02-13 RX ADMIN — HYDROMORPHONE HYDROCHLORIDE 0.5 MG: 1 INJECTION, SOLUTION INTRAMUSCULAR; INTRAVENOUS; SUBCUTANEOUS at 08:08

## 2023-02-13 RX ADMIN — POLYETHYLENE GLYCOL 3350 17 G: 17 POWDER, FOR SOLUTION ORAL at 17:26

## 2023-02-13 RX ADMIN — HYDROMORPHONE HYDROCHLORIDE 0.5 MG: 1 INJECTION, SOLUTION INTRAMUSCULAR; INTRAVENOUS; SUBCUTANEOUS at 20:51

## 2023-02-13 RX ADMIN — VANCOMYCIN HYDROCHLORIDE 1000 MG: 1 INJECTION, SOLUTION INTRAVENOUS at 08:30

## 2023-02-13 RX ADMIN — HYDROCORTISONE SODIUM SUCCINATE 50 MG: 100 INJECTION, POWDER, FOR SOLUTION INTRAMUSCULAR; INTRAVENOUS at 08:26

## 2023-02-13 RX ADMIN — HEPARIN SODIUM 5000 UNITS: 5000 INJECTION INTRAVENOUS; SUBCUTANEOUS at 13:56

## 2023-02-13 RX ADMIN — HEPARIN SODIUM 5000 UNITS: 5000 INJECTION INTRAVENOUS; SUBCUTANEOUS at 22:49

## 2023-02-13 RX ADMIN — SODIUM CHLORIDE, SODIUM GLUCONATE, SODIUM ACETATE, POTASSIUM CHLORIDE, MAGNESIUM CHLORIDE, SODIUM PHOSPHATE, DIBASIC, AND POTASSIUM PHOSPHATE 150 ML/HR: .53; .5; .37; .037; .03; .012; .00082 INJECTION, SOLUTION INTRAVENOUS at 08:35

## 2023-02-13 RX ADMIN — HYDROMORPHONE HYDROCHLORIDE 0.5 MG: 1 INJECTION, SOLUTION INTRAMUSCULAR; INTRAVENOUS; SUBCUTANEOUS at 17:27

## 2023-02-13 RX ADMIN — SODIUM CHLORIDE, SODIUM GLUCONATE, SODIUM ACETATE, POTASSIUM CHLORIDE, MAGNESIUM CHLORIDE, SODIUM PHOSPHATE, DIBASIC, AND POTASSIUM PHOSPHATE 150 ML/HR: .53; .5; .37; .037; .03; .012; .00082 INJECTION, SOLUTION INTRAVENOUS at 01:36

## 2023-02-13 RX ADMIN — SODIUM BICARBONATE 1300 MG: 650 TABLET ORAL at 17:26

## 2023-02-13 NOTE — PROGRESS NOTES
Progress Note - Nephrology   Eloisa George 59 y o  male MRN: [id number]  Unit/Bed#: -01 Encounter: [id number]    A/P:  1  Acute kidney injury on top of chronic kidney disease              Creatinine back down to typical baseline this morning  Will reduce IV fluids to 50 mL/min, if the patient continues to keep up with fluid intake, may discontinue fluids as early as this afternoon, otherwise will look to discontinue by tomorrow morning, February 14   2   Chronic kidney disease stage IV with baseline creatinine between 2 9-3 mg/dL  3  IV contrast prophylaxis              Will continue IV fluids as noted above, but may ultimately discontinue it by this afternoon  Continue to closely monitor kidney function over the next 24-48 hours as the patient was at high risk for IV contrast induced nephropathy  4   Hypertension the setting of chronic kidney disease stage IV              Blood pressures controlled at this time, continue to monitor  5   Metabolic acidosis              Patient serum bicarbonate is down to 16 mmol/L  Is most likely due to his underlying metabolic acidosis for which he typically takes 2 tabs of sodium bicarbonate once daily  We will check a venous blood gas and a beta hydroxybutyrate to ensure there are no other issues, of note the patient's lactic acid yesterday was appropriate  We will initiate the patient on 2 tablets of sodium bicarbonate twice daily for now, and likely reduce back down to once daily once he has achieved a more appropriate bicarb level  6   Perianal abscess              Patient is status post incision and drainage by surgical colleagues yesterday  Continue antibiotics according to their recommendations  7  Chronic pilonidal cyst  8    History of bladder cancer status post cystectomy and ilial conduit      Follow up reason for today's visit: Acute kidney injury/chronic kidney disease/hypertension/acidosis    Pilonidal disease    Patient Active Problem List Diagnosis   • Perianal abscess   • Pilonidal disease   • BALDEMAR (acute kidney injury) ([facility] [address] )   • Polyarthralgia   • History of bladder cancer   • Primary hypertension         Subjective:   Pain controlled, ate breakfast with no nausea or vomiting this morning  Objective:     Vitals: Blood pressure 107/58, pulse 73, temperature 98 5 °F (36 9 °C), resp  rate 16, height 5' 9" (1 753 m), weight 78 kg (172 lb), SpO2 98 %  ,Body mass index is 25 4 kg/m²  Weight (last 2 days)     Date/Time Weight    02/12/23 1121 78 (172)            Intake/Output Summary (Last 24 hours) at 2/13/2023 0939  Last data filed at 2/13/2023 0835  Gross per 24 hour   Intake 3550 ml   Output 1950 ml   Net 1600 ml     I/O last 3 completed shifts:   In: 2550 [I V :1500; IV Piggyback:1050]  Out: 1350 [Urine:1300; Blood:50]         Physical Exam: /58   Pulse 73   Temp 98 5 °F (36 9 °C)   Resp 16   Ht 5' 9" (1 753 m)   Wt 78 kg (172 lb)   SpO2 98%   BMI 25 40 kg/m²     General Appearance:    Alert, cooperative, no distress, appears stated age   Head:    Normocephalic, without obvious abnormality, atraumatic   Eyes:    Conjunctiva/corneas clear   Ears:    Normal external ears   Nose:   Nares normal, septum midline, mucosa normal, no drainage    or sinus tenderness   Throat:   Lips, mucosa, and tongue normal; teeth and gums normal   Neck:   Supple   Back:     Symmetric, no curvature, ROM normal, no CVA tenderness   Lungs:     Clear to auscultation bilaterally, respirations unlabored   Chest wall:    No tenderness or deformity   Heart:    Regular rate and rhythm, S1 and S2 normal, no murmur, rub   or gallop   Abdomen:     Soft, non-tender, bowel sounds active   Extremities:   Extremities normal, atraumatic, no cyanosis or edema   Skin:   Skin color, texture, turgor normal, no rashes or lesions   Lymph nodes:   Cervical normal   Neurologic:   CNII-XII intact            Lab, Imaging and other studies: I have personally reviewed pertinent labs   CBC:   Lab Results   Component Value Date    WBC 14 19 (H) 02/13/2023    HGB 13 4 02/13/2023    HCT 41 8 02/13/2023    MCV 90 02/13/2023     02/13/2023    MCH 28 9 02/13/2023    MCHC 32 1 02/13/2023    RDW 14 7 02/13/2023    MPV 8 8 (L) 02/13/2023    NRBC 0 02/13/2023     CMP:   Lab Results   Component Value Date    K 3 5 02/13/2023     (H) 02/13/2023    CO2 16 (L) 02/13/2023    BUN 42 (H) 02/13/2023    CREATININE 2 94 (H) 02/13/2023    CALCIUM 8 5 02/13/2023    AST 8 (L) 02/13/2023    ALT 8 02/13/2023    ALKPHOS 80 02/13/2023    EGFR 22 02/13/2023         Results from last 7 days   Lab Units 02/13/23  0451 02/12/23  1226   POTASSIUM mmol/L 3 5 3 5   CHLORIDE mmol/L 109* 106   CO2 mmol/L 16* 18*   BUN mg/dL 42* 47*   CREATININE mg/dL 2 94* 3 55*   CALCIUM mg/dL 8 5 9 4   ALK PHOS U/L 80 102   ALT U/L 8 10   AST U/L 8* 11*         Phosphorus:   Lab Results   Component Value Date    PHOS 3 7 02/13/2023     Magnesium:   Lab Results   Component Value Date    MG 2 3 02/13/2023     Urinalysis:   Lab Results   Component Value Date    COLORU Straw 02/13/2023    CLARITYU Clear 02/13/2023    SPECGRAV 1 010 02/13/2023    PHUR 7 0 02/13/2023    LEUKOCYTESUR 1+ (A) 02/13/2023    NITRITE Negative 02/13/2023    GLUCOSEU Negative 02/13/2023    KETONESU Negative 02/13/2023    BILIRUBINUR Negative 02/13/2023    BLOODU Trace-Intact (A) 02/13/2023     Ionized Calcium: No results found for: CAION  Coagulation:   Lab Results   Component Value Date    INR 1 08 02/12/2023     Troponin: No results found for: TROPONINI  ABG: No results found for: PHART, RFJ9XZG, PO2ART, QZP7OVL, Y1FOKCDY, BEART, SOURCE  Radiology review:     IMAGING  Procedure: CT pelvis w contrast    Result Date: 2/12/2023  Narrative: CT PELVIS WITH IV CONTRAST INDICATION:   Large pilonidal sinus versus abscess, surgical planning  COMPARISON:  CT abdomen pelvis 6/30/2016  TECHNIQUE: CT examination of the pelvis was performed    Axial, sagittal, and coronal 2D reformatted images were created from the source data and submitted for interpretation  Radiation dose length product (DLP) for this visit:  407 mGy-cm   This examination, like all CT scans performed in the [facility], was performed utilizing techniques to minimize radiation dose exposure, including the use of iterative reconstruction and automated exposure control  IV Contrast:  100 mL of iohexol (OMNIPAQUE) Enteric Contrast:  Enteric contrast was not administered  FINDINGS: VISUALIZED KIDNEYS/URETERS:  Ileal conduit on the right  REPRODUCTIVE ORGANS:  Unremarkable for patient's age  URINARY BLADDER:  Surgically absent  APPENDIX:  No findings to suggest appendicitis  VISUALIZED BOWEL:  Right lower quadrant ostomy  ABDOMINOPELVIC CAVITY:  No ascites or free intraperitoneal air  No lymphadenopathy  VISUALIZED VESSELS:  The left external iliac artery appears nearly occluded proximally but appears patent distally  ABDOMINOPELVIC WALL/INGUINAL REGIONS: Large collection in the left posterior perineal and buttock region medially  Inferior portion of the collection measures 6 0 x 3 2 cm image 110 series 2  This is in close proximity to the anorectal junction  Associated skin thickening and infiltration  Posteriorly the skin thickening and inflammation extends adjacent to the distal coccyx  OSSEOUS STRUCTURES:  No acute fracture or destructive osseous lesion  Impression: 1  Large collection in the left posterior perineal and buttock region medially  This is in close proximity to the anorectal junction  This is most likely a perianal abscess  2   The left external iliac artery appears nearly occluded proximally but appears patent distally  The study was marked in [facility] for immediate notification   Workstation performed: [id number]       Current Facility-Administered Medications   Medication Dose Route Frequency   • acetaminophen (TYLENOL) tablet 650 mg  650 mg Oral Q6H PRN   • cefepime (MAXIPIME) 500 mg in sodium chloride 0 9 % 50 mL IVPB  500 mg Intravenous Q24H   • heparin (porcine) subcutaneous injection 5,000 Units  5,000 Units Subcutaneous Q8H [facility]   • HYDROmorphone (DILAUDID) injection 0 5 mg  0 5 mg Intravenous Q3H PRN   • [START ON 2/14/2023] metoprolol succinate (TOPROL-XL) 24 hr tablet 25 mg  25 mg Oral Daily   • multi-electrolyte (PLASMALYTE-A/ISOLYTE-S PH 7 4) IV solution  150 mL/hr Intravenous Continuous   • nicotine (NICODERM CQ) 14 mg/24hr TD 24 hr patch 1 patch  1 patch Transdermal Daily   • ondansetron (ZOFRAN) injection 4 mg  4 mg Intravenous Q6H PRN   • oxyCODONE (ROXICODONE) IR tablet 5 mg  5 mg Oral Q3H PRN   • [START ON 2/14/2023] predniSONE tablet 1 mg  1 mg Oral Daily   • vancomycin (VANCOCIN) IVPB (premix in dextrose) 1,000 mg 200 mL  12 5 mg/kg Intravenous Once PRN     Medications Discontinued During This Encounter   Medication Reason   • cefepime (MAXIPIME) IVPB (premix in dextrose) 1,000 mg 50 mL    • bupivacaine (MARCAINE) 0 5 % injection Patient Discharge   • sodium chloride 0 9 % irrigation solution Patient Discharge   • fentaNYL (SUBLIMAZE) injection 25 mcg Patient Transfer   • HYDROmorphone (DILAUDID) injection 0 2 mg Patient Transfer   • ondansetron (ZOFRAN) injection 4 mg Patient Transfer   • hydrocortisone (Solu-CORTEF) injection 50 mg        Suresh Myers,       This progress note was produced in part using a dictation device which may document imprecise wording from author's original intent

## 2023-02-13 NOTE — ASSESSMENT & PLAN NOTE
Patient presented with worsening pilonidal and left perianal pain, swelling, erythema, and purulent drainage over the past 1 to 2 weeks    · He is status post I&D on 2/12/2023  · Treatment per primary team  · Follow-up with cultures  · Continue with cefepime and vancomycin, will optimize based on culture results

## 2023-02-13 NOTE — PLAN OF CARE
Problem: PAIN - ADULT  Goal: Verbalizes/displays adequate comfort level or baseline comfort level  Description: Interventions:  - Encourage patient to monitor pain and request assistance  - Assess pain using appropriate pain scale  - Administer analgesics based on type and severity of pain and evaluate response  - Implement non-pharmacological measures as appropriate and evaluate response  - Consider cultural and social influences on pain and pain management  - Notify physician/advanced practitioner if interventions unsuccessful or patient reports new pain  Outcome: Progressing     Problem: INFECTION - ADULT  Goal: Absence or prevention of progression during hospitalization  Description: INTERVENTIONS:  - Assess and monitor for signs and symptoms of infection  - Monitor lab/diagnostic results  - Monitor all insertion sites, i e  indwelling lines, tubes, and drains  - Monitor endotracheal if appropriate and nasal secretions for changes in amount and color  - [place] appropriate cooling/warming therapies per order  - Administer medications as ordered  - Instruct and encourage patient and family to use good hand hygiene technique  - Identify and instruct in appropriate isolation precautions for identified infection/condition  Outcome: Progressing  Goal: Absence of fever/infection during neutropenic period  Description: INTERVENTIONS:  - Monitor WBC    Outcome: Progressing     Problem: SAFETY ADULT  Goal: Patient will remain free of falls  Description: INTERVENTIONS:  - Educate patient/family on patient safety including physical limitations  - Instruct patient to call for assistance with activity   - Consult OT/PT to assist with strengthening/mobility   - Keep Call bell within reach  - Keep bed low and locked with side rails adjusted as appropriate  - Keep care items and personal belongings within reach  - Initiate and maintain comfort rounds  - Make Fall Risk Sign visible to staff  - Offer Toileting every 2 Hours, in advance of need  - Initiate/Maintain bed/chair alarm  - Obtain necessary fall risk management equipment: bed in lowest and locked position, call bell within reach  - Apply yellow socks and bracelet for high fall risk patients  - Consider moving patient to room near nurses station  Outcome: Progressing  Goal: Maintain or return to baseline ADL function  Description: INTERVENTIONS:  -  Assess patient's ability to carry out ADLs; assess patient's baseline for ADL function and identify physical deficits which impact ability to perform ADLs (bathing, care of mouth/teeth, toileting, grooming, dressing, etc )  - Assess/evaluate cause of self-care deficits   - Assess range of motion  - Assess patient's mobility; develop plan if impaired  - Assess patient's need for assistive devices and provide as appropriate  - Encourage maximum independence but intervene and supervise when necessary  - Involve family in performance of ADLs  - Assess for home care needs following discharge   - Consider OT consult to assist with ADL evaluation and planning for discharge  - Provide patient education as appropriate  Outcome: Progressing  Goal: Maintains/Returns to pre admission functional level  Description: INTERVENTIONS:  - Perform BMAT or MOVE assessment daily    - Set and communicate daily mobility goal to care team and patient/family/caregiver  - Collaborate with rehabilitation services on mobility goals if consulted  - Perform Range of Motion 3 times a day  - Reposition patient every 2 hours    - Dangle patient 3 times a day  - Stand patient 3 times a day  - Ambulate patient 3 times a day  - Out of bed to chair 3 times a day   - Out of bed for meals 3 times a day  - Out of bed for toileting  - Record patient progress and toleration of activity level   Outcome: Progressing     Problem: DISCHARGE PLANNING  Goal: Discharge to home or other facility with appropriate resources  Description: INTERVENTIONS:  - Identify barriers to discharge w/patient and caregiver  - Arrange for needed discharge resources and transportation as appropriate  - Identify discharge learning needs (meds, wound care, etc )  - Arrange for interpretive services to assist at discharge as needed  - Refer to Case Management Department for coordinating discharge planning if the patient needs post-hospital services based on physician/advanced practitioner order or complex needs related to functional status, cognitive ability, or social support system  Outcome: Progressing     Problem: Knowledge Deficit  Goal: Patient/family/caregiver demonstrates understanding of disease process, treatment plan, medications, and discharge instructions  Description: Complete learning assessment and assess knowledge base  Interventions:  - Provide teaching at level of understanding  - Provide teaching via preferred learning methods  Outcome: Progressing     Problem: Nutrition/Hydration-ADULT  Goal: Nutrient/Hydration intake appropriate for improving, restoring or maintaining nutritional needs  Description: Monitor and assess patient's nutrition/hydration status for malnutrition  Collaborate with interdisciplinary team and initiate plan and interventions as ordered  Monitor patient's weight and dietary intake as ordered or per policy  Utilize nutrition screening tool and intervene as necessary  Determine patient's food preferences and provide high-protein, high-caloric foods as appropriate       INTERVENTIONS:  - Monitor oral intake, urinary output, labs, and treatment plans  - Assess nutrition and hydration status and recommend course of action  - Evaluate amount of meals eaten  - Assist patient with eating if necessary   - Allow adequate time for meals  - Recommend/ encourage appropriate diets, oral nutritional supplements, and vitamin/mineral supplements  - Order, calculate, and assess calorie counts as needed  - Recommend, monitor, and adjust tube feedings and TPN/PPN based on assessed needs  - Assess need for intravenous fluids  - Provide specific nutrition/hydration education as appropriate  - Include patient/family/caregiver in decisions related to nutrition  Outcome: Progressing

## 2023-02-13 NOTE — PROGRESS NOTES
Progress Note - General Surgery   Hector Sun 59 y o  male MRN: [id number]  Unit/Bed#: -[facility] Encounter: [id number]    ASSESSMENT:  58 y/o M w/ hx prior bladder cancer status post chemotherapy, cystectomy, ileal conduit, additional chemotherapy 2009, known CKD4, now with acute kidney injury on top of known CKD who presented with worsening pilonidal disease and development of perianal abscess  Pilonidal disease   Perianal abscess, now POD#1 s/p I&D in the OR  -AF, VSS  -Leukocytosis improving, WBC 14 19 from 19 63  -Intraop wound cx: GNR, G+ cocci  -Anaerobic cx NGTD  -Blood cx NGTD    BALDEMAR on CKD   -Cr 2 94 from 3 55  -UO: 1 3L, urine yellow/clear in urostomy bag  -Int Med and Nephro following, appreciate recs     Adrenal Insufficiency  -On long term steroids for polymyalgia rheumatica, currently being weaned off with taper, follows with rheum outpatient  -Recieved stress dose during surgery  -Int Med following for management, appreciate assistance    PLAN:  -IV abx, FU C&S adjust as needed  -D/C IVF this afternoon vs tomorrow AM, appreciate Nephro involvement  -Regular diet as tolerated  -Analgesia prn, transition to po, breakthrough IV as needed  -DVT ppx, SQH  -Start daily metamucil and colace   -AM labs, trend WBC, Cr  -Daily packing changes  Will coordinate with nursing and pt's wife to administer analgesia prior to change and to educate wife on wound care    -Dispo planning: will discuss w/ CM regarding VNA for packing changes       SUBJECTIVE:  Pain well controlled with medication, significantly improved from presentation  Tolerating diet, no n/v  Having urine output from ileal conduit  Passing flatus, last BM was 4 days ago  Pt says wife is very involved with his medical care and would be interested in learning out to do packing change  OBJECTIVE:   Vitals:  Blood pressure 107/58, pulse 73, temperature 98 5 °F (36 9 °C), resp  rate 16, height 5' 9" (1 753 m), weight 78 kg (172 lb), SpO2 98 %  ,Body mass index is 25 4 kg/m²  I/Os:    Intake/Output Summary (Last 24 hours) at 2/13/2023 1040  Last data filed at 2/13/2023 0835  Gross per 24 hour   Intake 3550 ml   Output 1950 ml   Net 1600 ml       Lines/Drains:  Invasive Devices     Peripheral Intravenous Line  Duration           Peripheral IV 02/12/23 Left Antecubital <1 day    Peripheral IV 02/12/23 Right Antecubital <1 day          Drain  Duration           Urostomy RLQ -- days                Physical Exam  Vitals reviewed  Constitutional:       General: He is not in acute distress  Appearance: He is not toxic-appearing  HENT:      Head: Normocephalic and atraumatic  Cardiovascular:      Rate and Rhythm: Normal rate and regular rhythm  Pulmonary:      Effort: Pulmonary effort is normal    Abdominal:      General: Bowel sounds are normal  There is no distension  Palpations: Abdomen is soft  Tenderness: There is no abdominal tenderness  There is no guarding or rebound  Genitourinary:     Comments: Ileal conduit, urostomy bag with clear yellow urine  Neurological:      General: No focal deficit present  Mental Status: He is alert  Diagnostics:  I have personally reviewed pertinent lab results  CT pelvis w contrast    Result Date: 2/12/2023  Impression: 1  Large collection in the left posterior perineal and buttock region medially  This is in close proximity to the anorectal junction  This is most likely a perianal abscess  2   The left external iliac artery appears nearly occluded proximally but appears patent distally  The study was marked in [facility] for immediate notification   Workstation performed: [id number]     Recent Results (from the past 36 hour(s))   CBC and differential    Collection Time: 02/12/23 12:26 PM   Result Value Ref Range    WBC 19 63 (H) 4 31 - 10 16 Thousand/uL    RBC 5 53 3 88 - 5 62 Million/uL    Hemoglobin 16 2 12 0 - 17 0 g/dL    Hematocrit 50 3 (H) 36 5 - 49 3 %    MCV 91 82 - 98 fL    MCH 29 3 26 8 - 34 3 pg    MCHC 32 2 31 4 - 37 4 g/dL    RDW 14 6 11 6 - 15 1 %    MPV 8 8 (L) 8 9 - 12 7 fL    Platelets 022 851 - 065 Thousands/uL    nRBC 0 /100 WBCs    Neutrophils Relative 83 (H) 43 - 75 %    Immat GRANS % 1 0 - 2 %    Lymphocytes Relative 6 (L) 14 - 44 %    Monocytes Relative 8 4 - 12 %    Eosinophils Relative 1 0 - 6 %    Basophils Relative 1 0 - 1 %    Neutrophils Absolute 16 65 (H) 1 85 - 7 62 Thousands/µL    Immature Grans Absolute 0 13 0 00 - 0 20 Thousand/uL    Lymphocytes Absolute 1 08 0 60 - 4 47 Thousands/µL    Monocytes Absolute 1 48 (H) 0 17 - 1 22 Thousand/µL    Eosinophils Absolute 0 20 0 00 - 0 61 Thousand/µL    Basophils Absolute 0 09 0 00 - 0 10 Thousands/µL   Comprehensive metabolic panel    Collection Time: 02/12/23 12:26 PM   Result Value Ref Range    Sodium 135 135 - 147 mmol/L    Potassium 3 5 3 5 - 5 3 mmol/L    Chloride 106 96 - 108 mmol/L    CO2 18 (L) 21 - 32 mmol/L    ANION GAP 11 4 - 13 mmol/L    BUN 47 (H) 5 - 25 mg/dL    Creatinine 3 55 (H) 0 60 - 1 30 mg/dL    Glucose 108 65 - 140 mg/dL    Calcium 9 4 8 4 - 10 2 mg/dL    AST 11 (L) 13 - 39 U/L    ALT 10 7 - 52 U/L    Alkaline Phosphatase 102 34 - 104 U/L    Total Protein 8 0 6 4 - 8 4 g/dL    Albumin 3 8 3 5 - 5 0 g/dL    Total Bilirubin 0 46 0 20 - 1 00 mg/dL    eGFR 17 ml/min/1 73sq m   Lactic acid    Collection Time: 02/12/23 12:26 PM   Result Value Ref Range    LACTIC ACID 0 7 0 5 - 2 0 mmol/L   Procalcitonin    Collection Time: 02/12/23 12:26 PM   Result Value Ref Range    Procalcitonin 0 49 (H) <=0 25 ng/ml   Protime-INR    Collection Time: 02/12/23 12:26 PM   Result Value Ref Range    Protime 14 0 11 6 - 14 5 seconds    INR 1 08 0 84 - 1 19   APTT    Collection Time: 02/12/23 12:26 PM   Result Value Ref Range    PTT 31 23 - 37 seconds   Blood culture #1    Collection Time: 02/12/23 12:26 PM    Specimen: Arm, Left; Blood   Result Value Ref Range    Blood Culture Received in Microbiology Lab  Culture in Progress  Blood culture #2    Collection Time: 02/12/23 12:26 PM    Specimen: Arm, Right; Blood   Result Value Ref Range    Blood Culture Received in Microbiology Lab  Culture in Progress  Wound culture and Gram stain    Collection Time: 02/12/23  4:32 PM    Specimen: Sacrum; Wound   Result Value Ref Range    Gram Stain Result 2+ Polys     Gram Stain Result No organisms seen    Anaerobic culture and Gram stain    Collection Time: 02/12/23  4:33 PM    Specimen: Sacrum; Wound   Result Value Ref Range    Anaerobic Culture Culture results to follow  Wound culture and Gram stain    Collection Time: 02/12/23  4:33 PM    Specimen: Sacrum;  Wound   Result Value Ref Range    Gram Stain Result 3+ Gram negative rods (A)     Gram Stain Result 2+ Gram positive cocci in pairs (A)     Gram Stain Result 2+ Polys (A)    CBC and differential    Collection Time: 02/13/23  4:51 AM   Result Value Ref Range    WBC 14 19 (H) 4 31 - 10 16 Thousand/uL    RBC 4 63 3 88 - 5 62 Million/uL    Hemoglobin 13 4 12 0 - 17 0 g/dL    Hematocrit 41 8 36 5 - 49 3 %    MCV 90 82 - 98 fL    MCH 28 9 26 8 - 34 3 pg    MCHC 32 1 31 4 - 37 4 g/dL    RDW 14 7 11 6 - 15 1 %    MPV 8 8 (L) 8 9 - 12 7 fL    Platelets 521 235 - 781 Thousands/uL    nRBC 0 /100 WBCs    Neutrophils Relative 83 (H) 43 - 75 %    Immat GRANS % 1 0 - 2 %    Lymphocytes Relative 9 (L) 14 - 44 %    Monocytes Relative 7 4 - 12 %    Eosinophils Relative 0 0 - 6 %    Basophils Relative 0 0 - 1 %    Neutrophils Absolute 11 74 (H) 1 85 - 7 62 Thousands/µL    Immature Grans Absolute 0 11 0 00 - 0 20 Thousand/uL    Lymphocytes Absolute 1 30 0 60 - 4 47 Thousands/µL    Monocytes Absolute 0 93 0 17 - 1 22 Thousand/µL    Eosinophils Absolute 0 05 0 00 - 0 61 Thousand/µL    Basophils Absolute 0 06 0 00 - 0 10 Thousands/µL   Comprehensive metabolic panel    Collection Time: 02/13/23  4:51 AM   Result Value Ref Range    Sodium 135 135 - 147 mmol/L    Potassium 3 5 3 5 - 5 3 mmol/L    Chloride 109 (H) 96 - 108 mmol/L    CO2 16 (L) 21 - 32 mmol/L    ANION GAP 10 4 - 13 mmol/L    BUN 42 (H) 5 - 25 mg/dL    Creatinine 2 94 (H) 0 60 - 1 30 mg/dL    Glucose 144 (H) 65 - 140 mg/dL    Calcium 8 5 8 4 - 10 2 mg/dL    Corrected Calcium 9 2 8 3 - 10 1 mg/dL    AST 8 (L) 13 - 39 U/L    ALT 8 7 - 52 U/L    Alkaline Phosphatase 80 34 - 104 U/L    Total Protein 6 2 (L) 6 4 - 8 4 g/dL    Albumin 3 1 (L) 3 5 - 5 0 g/dL    Total Bilirubin 0 29 0 20 - 1 00 mg/dL    eGFR 22 ml/min/1 73sq m   Magnesium    Collection Time: 02/13/23  4:51 AM   Result Value Ref Range    Magnesium 2 3 1 9 - 2 7 mg/dL   Phosphorus    Collection Time: 02/13/23  4:51 AM   Result Value Ref Range    Phosphorus 3 7 2 7 - 4 5 mg/dL   Vancomycin, random    Collection Time: 02/13/23  4:51 AM   Result Value Ref Range    Vancomycin Rm 9 9 (L) 10 0 - 20 0 ug/mL   UA w Reflex to Microscopic w Reflex to Culture    Collection Time: 02/13/23  7:41 AM    Specimen: Urine, Other   Result Value Ref Range    Color, UA Straw Yellow, Straw    Clarity, UA Clear Hazy, Clear    Specific [place], UA 1 010 >1 005 - <1 030    pH, UA 7 0 5 0, 5 5, 6 0, 6 5, 7 0, 7 5    Leukocytes, UA 1+ (A) Negative    Nitrite, UA Negative Negative    Protein, UA Negative Negative, Interference- unable to analyze mg/dl    Glucose, UA Negative Negative mg/dl    Ketones, UA Negative Negative mg/dl    Urobilinogen, UA 0 2 0 2, 1 0 E U /dl E U /dl    Bilirubin, UA Negative Negative    Occult Blood, UA Trace-Intact (A) Negative       Current Medications:  Scheduled Meds:  Current Facility-Administered Medications   Medication Dose Route Frequency Provider Last Rate   • acetaminophen  650 mg Oral Q6H PRN Foreign Hitchcock MD     • cefepime  500 mg Intravenous Q24H Foreign Hitchcock  mg (02/13/23 0104)   • heparin (porcine)  5,000 Units Subcutaneous Q8H [facility] Foreign Hitchcock MD     • HYDROmorphone  0 5 mg Intravenous Q3H PRN Foreign Hitchcock MD     • [START ON 2/14/2023] metoprolol succinate  25 mg Oral Daily Sumair Preeti Palmer MD     • multi-electrolyte  50 mL/hr Intravenous Continuous [facility], DO 50 mL/hr (02/13/23 1010)   • nicotine  1 patch Transdermal Daily Joao Renner MD     • ondansetron  4 mg Intravenous Q6H PRN Joao Renner MD     • oxyCODONE  5 mg Oral Q3H PRN Joao Renner MD     • [START ON 2/14/2023] predniSONE  1 mg Oral Daily VANESA Matt     • sodium bicarbonate  1,300 mg Oral BID after meals [facility], DO     • vancomycin  12 5 mg/kg Intravenous Once PRN Joao Renner MD       Continuous Infusions:multi-electrolyte, 50 mL/hr, Last Rate: 50 mL/hr (02/13/23 1010)      PRN Meds:  •  acetaminophen  •  HYDROmorphone  •  ondansetron  •  oxyCODONE  •  vancomycin      LOU Singh  2/13/2023

## 2023-02-13 NOTE — ASSESSMENT & PLAN NOTE
· Due to soft BP prior to procedure, will hold off on amlodipine 10 mg daily and Toprol 25 mg daily from home for now  · Toprol can be resumed tomorrow

## 2023-02-13 NOTE — PROGRESS NOTES
Rowan Garcia is a 59 y o  male who is currently ordered Vancomycin IV with management by the Pharmacy Consult service  Relevant clinical data and objective / subjective history reviewed  Vancomycin Assessment:  Indication and Goal AUC/Trough: Soft tissue (goal -600, trough >10), -600, trough >10  Clinical Status: stable  Micro:   pending  Renal Function:  SCr: 2 94 mg/dL  CrCl: 27 1 mL/min  Renal replacement: Not on dialysis  Days of Therapy: 2  Current Dose: 1g daily prn when random level <15  Vancomycin Plan:  New Dosin mg IV once, followed by pulse dosing 1000 mg iv once prn once random lvl below 15  Next Level:  6am  Renal Function Monitoring: Daily BMP and [place] will continue to follow closely for s/sx of nephrotoxicity, infusion reactions and appropriateness of therapy  BMP and CBC will be ordered per protocol  We will continue to follow the patient’s culture results and clinical progress daily      Marcos Teran, Pharmacist

## 2023-02-13 NOTE — ASSESSMENT & PLAN NOTE
· Diagnosed approximately 1 year ago  · Follows with rheumatology at [facility]  · The patient was started on prednisone taper course by 1 mg every month  Currently at 1 mg daily    · Noted to have soft blood pressure readings in the OR  · Patient has been receiving stress dose steroids  · We will give Solu-Cortef 50 mg today  · Transition to prednisone 1 mg on 2/14/2023

## 2023-02-13 NOTE — APP STUDENT NOTE
INTERNAL MEDICINE RESIDENCY PROGRESS NOTE     Name: Merlyn Tom   Age & Sex: 59 y o  male   MRN: [id number]  Unit/Bed#: -[facility]   Encounter: [id number]  Team: {ASHWIN RUBIN:[id number]}    PATIENT INFORMATION     Name: Merlyn Tom   Age & Sex: 59 y o  male   MRN: [id number]  Hospital Stay Days: 1    ASSESSMENT/PLAN     Principal Problem:    Pilonidal disease  Active Problems:    Perianal abscess    BALDEMAR (acute kidney injury) ([address] )    Polyarthralgia    History of bladder cancer    Primary hypertension    Assessment/Plan    Pilonidal disease  - Pt presented to ER with worsening perianal pain, swelling drainage  - Post I&D on 2/12/2023  - WBC trending down   - Wound culture and Gram stain shows    3+ gram neg rods   2+ gram pos cocci in pairs   2+ polys   - Continue with cefepime and vancomycin     BALDEMAR  - Hx of CKD and baseline creatinine of 2 9-3mg/dL   - Creatinine in ED was 3 55 - trending down today to 2 94  - Continue IV fluids  - Repeat BMP in AM     Primary hypertension  - BP stable at this time  - Hold antihypertensives at this time due to BALDEMAR      Polyarthralgia  - Follows with rheumatology   - currently on prednisone 1mg daily   - received 50mg Solu-CORTEF this AM     History of bladder cancer   - Pt has hx of cystectomy and ileal conduit  - monitor kidney function     SUBJECTIVE     Patient seen and examined  No acute events overnight  Pt post I&D for pilonidal cyst  States his pain is controlled at 4/10 and he is comfortable  Pt is afebrile and denies any chills, SOB, chest pain, headaches, N/V/D, abdominal pain  Pt states he ate breakfast this AM and has an appetite  Pt states he is passing gas but has not had a BM today       OBJECTIVE     Vitals:    02/12/23 1733 02/12/23 1830 02/12/23 2230 02/13/23 0735   BP: 121/75 107/60 100/53 107/58   BP Location:       Pulse: 74 78 55 73   Resp: 19 18 18 16   Temp:   98 3 °F (36 8 °C) 98 5 °F (36 9 °C)   TempSrc:       SpO2: 97% 98% 97% 98%   Weight:       Height: Temperature:   Temp (24hrs), Av 5 °F (36 9 °C), Min:98 1 °F (36 7 °C), Max:98 9 °F (37 2 °C)    Temperature: 98 5 °F (36 9 °C)  Intake & Output:  I/O        0701  /12 0700 / 0701  / 0700 / 0701  /14 0700    I V  (mL/kg)  1500 (19 2) 1000 (12 8)    IV Piggyback  1050     Total Intake(mL/kg)  2550 (32 7) 1000 (12 8)    Urine (mL/kg/hr)  1300 600 (3 4)    Blood  50     Total Output  1350 600    Net  +1200 +400               Weights:   IBW (Ideal Body Weight): 70 7 kg    Body mass index is 25 4 kg/m²  Weight (last 2 days)     Date/Time Weight    23 1121 78 (172)        Physical Exam  Vitals and nursing note reviewed  Constitutional:       General: He is not in acute distress  Appearance: Normal appearance  He is not ill-appearing, toxic-appearing or diaphoretic  HENT:      Head: Normocephalic and atraumatic  Mouth/Throat:      Mouth: Mucous membranes are moist       Pharynx: Oropharynx is clear  Eyes:      Extraocular Movements: Extraocular movements intact  Conjunctiva/sclera: Conjunctivae normal       Pupils: Pupils are equal, round, and reactive to light  Cardiovascular:      Rate and Rhythm: Normal rate and regular rhythm  Pulses: Normal pulses  Heart sounds: Normal heart sounds  Pulmonary:      Effort: Pulmonary effort is normal  No respiratory distress  Breath sounds: Normal breath sounds  No wheezing, rhonchi or rales  Abdominal:      General: Abdomen is flat  Bowel sounds are normal  There is no distension  Palpations: Abdomen is soft  There is no mass  Tenderness: There is no abdominal tenderness  Musculoskeletal:      Cervical back: Normal range of motion  Skin:     Capillary Refill: Capillary refill takes less than 2 seconds  Neurological:      General: No focal deficit present  Mental Status: He is alert and oriented to person, place, and time     Psychiatric:         Mood and Affect: Mood normal  Behavior: Behavior normal        LABORATORY DATA     Labs: I have personally reviewed pertinent reports  Results from last 7 days   Lab Units 02/13/23  0451 02/12/23  1226   WBC Thousand/uL 14 19* 19 63*   HEMOGLOBIN g/dL 13 4 16 2   HEMATOCRIT % 41 8 50 3*   PLATELETS Thousands/uL 335 369   NEUTROS PCT % 83* 83*   MONOS PCT % 7 8      Results from last 7 days   Lab Units 02/13/23  0451 02/12/23  1226   POTASSIUM mmol/L 3 5 3 5   CHLORIDE mmol/L 109* 106   CO2 mmol/L 16* 18*   BUN mg/dL 42* 47*   CREATININE mg/dL 2 94* 3 55*   CALCIUM mg/dL 8 5 9 4   ALK PHOS U/L 80 102   ALT U/L 8 10   AST U/L 8* 11*     Results from last 7 days   Lab Units 02/13/23  0451   MAGNESIUM mg/dL 2 3     Results from last 7 days   Lab Units 02/13/23  0451   PHOSPHORUS mg/dL 3 7      Results from last 7 days   Lab Units 02/12/23  1226   INR  1 08   PTT seconds 31     Results from last 7 days   Lab Units 02/12/23  1226   LACTIC ACID mmol/L 0 7           IMAGING & DIAGNOSTIC TESTING     Radiology Results:   CT pelvis w contrast    Result Date: 2/12/2023  Impression: 1  Large collection in the left posterior perineal and buttock region medially  This is in close proximity to the anorectal junction  This is most likely a perianal abscess  2   The left external iliac artery appears nearly occluded proximally but appears patent distally  The study was marked in [facility] for immediate notification   Workstation performed: [id number]     ACTIVE MEDICATIONS     Current Facility-Administered Medications   Medication Dose Route Frequency   • acetaminophen (TYLENOL) tablet 650 mg  650 mg Oral Q6H PRN   • cefepime (MAXIPIME) 500 mg in sodium chloride 0 9 % 50 mL IVPB  500 mg Intravenous Q24H   • heparin (porcine) subcutaneous injection 5,000 Units  5,000 Units Subcutaneous Q8H [address]   • HYDROmorphone (DILAUDID) injection 0 5 mg  0 5 mg Intravenous Q3H PRN   • multi-electrolyte (PLASMALYTE-A/ISOLYTE-S PH 7 4) IV solution  150 mL/hr Intravenous Continuous   • nicotine (NICODERM CQ) 14 mg/24hr TD 24 hr patch 1 patch  1 patch Transdermal Daily   • ondansetron (ZOFRAN) injection 4 mg  4 mg Intravenous Q6H PRN   • oxyCODONE (ROXICODONE) IR tablet 5 mg  5 mg Oral Q3H PRN   • [START ON 2/14/2023] predniSONE tablet 1 mg  1 mg Oral Daily   • vancomycin (VANCOCIN) IVPB (premix in dextrose) 1,000 mg 200 mL  12 5 mg/kg Intravenous Once PRN   • vancomycin (VANCOCIN) IVPB (premix in dextrose) 1,000 mg 200 mL  1,000 mg Intravenous Once       VTE Pharmacologic Prophylaxis: {Pharmacologic VTE Prophylaxis:550292598}  VTE Mechanical Prophylaxis: {Mechanical VTE Prophylaxis:99846}    ==  Nahomi WATTS   [address] [place] [address]

## 2023-02-13 NOTE — ASSESSMENT & PLAN NOTE
· Likely multifactorial due to infection as well as dehydration  · Patient also received IV contrast for CT imaging  · Creatinine appears to be back to baseline  · Patient with chronic kidney disease stage IV with baseline creatinine between 2 9 to 3 mg/dL  · Nephrology following  · Avoid hypotension and nephrotoxins  · Monitor renal function closely

## 2023-02-13 NOTE — PROGRESS NOTES
[facility] 43  Progress Note - Erin Mistry 1965, 59 y o  male MRN: [id number]  Unit/Bed#: -01 Encounter: [id number]  Primary Care Provider: Zane Mcarthur DO   Date and time admitted to hospital: 2/12/2023 11:20 AM    * Pilonidal disease  Assessment & Plan  Patient presented with worsening pilonidal and left perianal pain, swelling, erythema, and purulent drainage over the past 1 to 2 weeks  · He is status post I&D on 2/12/2023  · Treatment per primary team  · Follow-up with cultures  · Continue with cefepime and vancomycin, will optimize based on culture results    BALDEMAR (acute kidney injury) ([facility] [address] )  Assessment & Plan  · Likely multifactorial due to infection as well as dehydration  · Patient also received IV contrast for CT imaging  · Creatinine appears to be back to baseline  · Patient with chronic kidney disease stage IV with baseline creatinine between 2 9 to 3 mg/dL  · Nephrology following  · Avoid hypotension and nephrotoxins  · Monitor renal function closely    Polyarthralgia  Assessment & Plan  · Diagnosed approximately 1 year ago  · Follows with rheumatology at [facility]  · The patient was started on prednisone taper course by 1 mg every month  Currently at 1 mg daily  · Noted to have soft blood pressure readings in the OR  · Patient has been receiving stress dose steroids  · We will give Solu-Cortef 50 mg today  · Transition to prednisone 1 mg on 2/14/2023      Primary hypertension  Assessment & Plan  · Due to soft BP prior to procedure, will hold off on amlodipine 10 mg daily and Toprol 25 mg daily from home for now  · Toprol can be resumed tomorrow    History of bladder cancer  Assessment & Plan  · Status post cystectomy and ileal conduit  · Continue supportive care      VTE Prophylaxis:  Heparin    Patient Centered Rounds: I have performed bedside rounds with nursing staff today      Discussions with Specialists or Other Care Team Provider: yes  Education and Discussions with Family / Patient: Updated patient regarding plan of care    Current Length of Stay: 1 day(s)    Current Patient Status: Inpatient   Certification Statement: The patient will continue to require additional inpatient hospital stay due to Pilonidal disease    Discharge Plan: Per primary team    Code Status: Level 1 - Full Code    Subjective:   No overnight events noted  Patient states he is feeling well this morning  Currently afebrile  Tolerating diet    Objective:     Vitals:   Temp (24hrs), Av 5 °F (36 9 °C), Min:98 1 °F (36 7 °C), Max:98 9 °F (37 2 °C)    Temp:  [98 1 °F (36 7 °C)-98 9 °F (37 2 °C)] 98 5 °F (36 9 °C)  HR:  [55-93] 73  Resp:  [13-19] 16  BP: (100-143)/(53-75) 107/58  SpO2:  [95 %-99 %] 98 %  Body mass index is 25 4 kg/m²  Input and Output Summary (last 24 hours): Intake/Output Summary (Last 24 hours) at 2023 0943  Last data filed at 2023 0835  Gross per 24 hour   Intake 3550 ml   Output 1950 ml   Net 1600 ml       Physical Exam:   Physical Exam  Constitutional:       General: He is not in acute distress  HENT:      Head: Normocephalic and atraumatic  Nose: Nose normal       Mouth/Throat:      Mouth: Mucous membranes are moist    Eyes:      Extraocular Movements: Extraocular movements intact  Conjunctiva/sclera: Conjunctivae normal    Cardiovascular:      Rate and Rhythm: Normal rate and regular rhythm  Pulmonary:      Effort: Pulmonary effort is normal  No respiratory distress  Abdominal:      Palpations: Abdomen is soft  Tenderness: There is no abdominal tenderness  Comments: Ileal conduit bag noted   Musculoskeletal:         General: Normal range of motion  Cervical back: Normal range of motion and neck supple  Skin:     General: Skin is warm and dry  Neurological:      General: No focal deficit present  Mental Status: He is alert  Mental status is at baseline  Cranial Nerves: No cranial nerve deficit  Psychiatric:         Mood and Affect: Mood normal          Behavior: Behavior normal          Additional Data:     Labs:    Results from last 7 days   Lab Units 02/13/23  0451   WBC Thousand/uL 14 19*   HEMOGLOBIN g/dL 13 4   HEMATOCRIT % 41 8   PLATELETS Thousands/uL 335   NEUTROS PCT % 83*   LYMPHS PCT % 9*   MONOS PCT % 7   EOS PCT % 0     Results from last 7 days   Lab Units 02/13/23  0451   SODIUM mmol/L 135   POTASSIUM mmol/L 3 5   CHLORIDE mmol/L 109*   CO2 mmol/L 16*   BUN mg/dL 42*   CREATININE mg/dL 2 94*   CALCIUM mg/dL 8 5   ALK PHOS U/L 80   ALT U/L 8   AST U/L 8*     Results from last 7 days   Lab Units 02/12/23  1226   INR  1 08               * I Have Reviewed All Lab Data Listed Above  * Additional Pertinent Lab Tests Reviewed: [address] Admission  Reviewed    Imaging:  Imaging Reports Reviewed Today Include: No new imaging    Recent Cultures (last 7 days):     Results from last 7 days   Lab Units 02/12/23  1633 02/12/23  1632 02/12/23  1226   BLOOD CULTURE   --   --  Received in Microbiology Lab  Culture in Progress  Received in Microbiology Lab  Culture in Progress     GRAM STAIN RESULT  3+ Gram negative rods*  2+ Gram positive cocci in pairs*  2+ Polys* 2+ Polys  No organisms seen  --        Last 24 Hours Medication List:   Current Facility-Administered Medications   Medication Dose Route Frequency Provider Last Rate   • acetaminophen  650 mg Oral Q6H PRN Zaki Granda MD     • cefepime  500 mg Intravenous Q24H Zaki Granda  mg (02/13/23 0104)   • heparin (porcine)  5,000 Units Subcutaneous Q8H Augusta Ko MD     • HYDROmorphone  0 5 mg Intravenous Q3H PRN Zaki Granda MD     • [START ON 2/14/2023] metoprolol succinate  25 mg Oral Daily Andi Ponce MD     • multi-electrolyte  50 mL/hr Intravenous Continuous Marie De Leon  mL/hr (02/13/23 6129)   • nicotine  1 patch Transdermal Daily Zaki Granda MD     • ondansetron  4 mg Intravenous Q6H PRN Kiera Xie MD     • oxyCODONE  5 mg Oral Q3H PRN Kiera Xie MD     • [START ON 2/14/2023] predniSONE  1 mg Oral Daily VANESA Poole     • sodium bicarbonate  1,300 mg Oral BID after meals Lake Heimlich, DO     • vancomycin  12 5 mg/kg Intravenous Once PRN Kiera Xie MD          Today, Patient Was Seen By: Savanna Henson MD    ** Please Note: Dictation voice to text software may have been used in the creation of this document   **

## 2023-02-14 ENCOUNTER — HOME HEALTH ADMISSION (OUTPATIENT)
Dept: HOME HEALTH SERVICES | Facility: HOME HEALTHCARE | Age: 58
End: 2023-02-14

## 2023-02-14 LAB
ANION GAP SERPL CALCULATED.3IONS-SCNC: 9 MMOL/L (ref 4–13)
ATRIAL RATE: 78 BPM
BASOPHILS # BLD AUTO: 0.13 THOUSANDS/ÂΜL (ref 0–0.1)
BASOPHILS NFR BLD AUTO: 1 % (ref 0–1)
BUN SERPL-MCNC: 37 MG/DL (ref 5–25)
CALCIUM SERPL-MCNC: 8.4 MG/DL (ref 8.4–10.2)
CHLORIDE SERPL-SCNC: 112 MMOL/L (ref 96–108)
CO2 SERPL-SCNC: 19 MMOL/L (ref 21–32)
CREAT SERPL-MCNC: 2.62 MG/DL (ref 0.6–1.3)
EOSINOPHIL # BLD AUTO: 0.61 THOUSAND/ÂΜL (ref 0–0.61)
EOSINOPHIL NFR BLD AUTO: 6 % (ref 0–6)
ERYTHROCYTE [DISTWIDTH] IN BLOOD BY AUTOMATED COUNT: 14.6 % (ref 11.6–15.1)
GFR SERPL CREATININE-BSD FRML MDRD: 25 ML/MIN/1.73SQ M
GLUCOSE SERPL-MCNC: 94 MG/DL (ref 65–140)
HCT VFR BLD AUTO: 39.5 % (ref 36.5–49.3)
HGB BLD-MCNC: 12.5 G/DL (ref 12–17)
IMM GRANULOCYTES # BLD AUTO: 0.09 THOUSAND/UL (ref 0–0.2)
IMM GRANULOCYTES NFR BLD AUTO: 1 % (ref 0–2)
LYMPHOCYTES # BLD AUTO: 2.8 THOUSANDS/ÂΜL (ref 0.6–4.47)
LYMPHOCYTES NFR BLD AUTO: 26 % (ref 14–44)
MCH RBC QN AUTO: 28.9 PG (ref 26.8–34.3)
MCHC RBC AUTO-ENTMCNC: 31.6 G/DL (ref 31.4–37.4)
MCV RBC AUTO: 91 FL (ref 82–98)
MONOCYTES # BLD AUTO: 1.13 THOUSAND/ÂΜL (ref 0.17–1.22)
MONOCYTES NFR BLD AUTO: 10 % (ref 4–12)
NEUTROPHILS # BLD AUTO: 6.15 THOUSANDS/ÂΜL (ref 1.85–7.62)
NEUTS SEG NFR BLD AUTO: 56 % (ref 43–75)
NRBC BLD AUTO-RTO: 0 /100 WBCS
P AXIS: 77 DEGREES
PLATELET # BLD AUTO: 327 THOUSANDS/UL (ref 149–390)
PMV BLD AUTO: 8.8 FL (ref 8.9–12.7)
POTASSIUM SERPL-SCNC: 3.3 MMOL/L (ref 3.5–5.3)
PR INTERVAL: 196 MS
QRS AXIS: 64 DEGREES
QRSD INTERVAL: 78 MS
QT INTERVAL: 374 MS
QTC INTERVAL: 426 MS
RBC # BLD AUTO: 4.32 MILLION/UL (ref 3.88–5.62)
SODIUM SERPL-SCNC: 140 MMOL/L (ref 135–147)
T WAVE AXIS: 71 DEGREES
VANCOMYCIN SERPL-MCNC: 13.6 UG/ML (ref 10–20)
VENTRICULAR RATE: 78 BPM
WBC # BLD AUTO: 10.91 THOUSAND/UL (ref 4.31–10.16)

## 2023-02-14 RX ORDER — OXYCODONE HYDROCHLORIDE 10 MG/1
10 TABLET ORAL EVERY 6 HOURS PRN
Status: DISCONTINUED | OUTPATIENT
Start: 2023-02-14 | End: 2023-02-15 | Stop reason: HOSPADM

## 2023-02-14 RX ORDER — FAMOTIDINE 20 MG/1
20 TABLET, FILM COATED ORAL DAILY
Status: DISCONTINUED | OUTPATIENT
Start: 2023-02-14 | End: 2023-02-15 | Stop reason: HOSPADM

## 2023-02-14 RX ORDER — POTASSIUM CHLORIDE 20 MEQ/1
20 TABLET, EXTENDED RELEASE ORAL ONCE
Status: COMPLETED | OUTPATIENT
Start: 2023-02-14 | End: 2023-02-14

## 2023-02-14 RX ORDER — VANCOMYCIN HYDROCHLORIDE 1 G/200ML
1000 INJECTION, SOLUTION INTRAVENOUS ONCE
Status: COMPLETED | OUTPATIENT
Start: 2023-02-14 | End: 2023-02-14

## 2023-02-14 RX ORDER — FAMOTIDINE 20 MG/1
20 TABLET, FILM COATED ORAL 2 TIMES DAILY
Status: DISCONTINUED | OUTPATIENT
Start: 2023-02-14 | End: 2023-02-14

## 2023-02-14 RX ORDER — HYDROMORPHONE HCL IN WATER/PF 6 MG/30 ML
0.2 PATIENT CONTROLLED ANALGESIA SYRINGE INTRAVENOUS EVERY 4 HOURS PRN
Status: DISCONTINUED | OUTPATIENT
Start: 2023-02-14 | End: 2023-02-15 | Stop reason: HOSPADM

## 2023-02-14 RX ORDER — HYDROMORPHONE HCL/PF 1 MG/ML
0.5 SYRINGE (ML) INJECTION ONCE
Status: COMPLETED | OUTPATIENT
Start: 2023-02-14 | End: 2023-02-14

## 2023-02-14 RX ADMIN — OXYCODONE HYDROCHLORIDE 10 MG: 10 TABLET ORAL at 13:00

## 2023-02-14 RX ADMIN — PREDNISONE 1 MG: 1 TABLET ORAL at 08:02

## 2023-02-14 RX ADMIN — POTASSIUM CHLORIDE 20 MEQ: 1500 TABLET, EXTENDED RELEASE ORAL at 13:01

## 2023-02-14 RX ADMIN — HYDROMORPHONE HYDROCHLORIDE 0.5 MG: 1 INJECTION, SOLUTION INTRAMUSCULAR; INTRAVENOUS; SUBCUTANEOUS at 07:47

## 2023-02-14 RX ADMIN — POLYETHYLENE GLYCOL 3350 17 G: 17 POWDER, FOR SOLUTION ORAL at 08:02

## 2023-02-14 RX ADMIN — PIPERACILLIN AND TAZOBACTAM 2.25 G: 2; .25 INJECTION, POWDER, FOR SOLUTION INTRAVENOUS at 15:13

## 2023-02-14 RX ADMIN — FAMOTIDINE 20 MG: 20 TABLET, FILM COATED ORAL at 13:01

## 2023-02-14 RX ADMIN — POTASSIUM CHLORIDE 20 MEQ: 1500 TABLET, EXTENDED RELEASE ORAL at 08:57

## 2023-02-14 RX ADMIN — HYDROMORPHONE HYDROCHLORIDE 0.5 MG: 1 INJECTION, SOLUTION INTRAMUSCULAR; INTRAVENOUS; SUBCUTANEOUS at 01:51

## 2023-02-14 RX ADMIN — SODIUM CHLORIDE, SODIUM GLUCONATE, SODIUM ACETATE, POTASSIUM CHLORIDE, MAGNESIUM CHLORIDE, SODIUM PHOSPHATE, DIBASIC, AND POTASSIUM PHOSPHATE 50 ML/HR: .53; .5; .37; .037; .03; .012; .00082 INJECTION, SOLUTION INTRAVENOUS at 02:25

## 2023-02-14 RX ADMIN — HYDROMORPHONE HYDROCHLORIDE 0.5 MG: 1 INJECTION, SOLUTION INTRAMUSCULAR; INTRAVENOUS; SUBCUTANEOUS at 13:03

## 2023-02-14 RX ADMIN — SODIUM BICARBONATE 1300 MG: 650 TABLET ORAL at 17:30

## 2023-02-14 RX ADMIN — Medication 1 PACKET: at 08:02

## 2023-02-14 RX ADMIN — HEPARIN SODIUM 5000 UNITS: 5000 INJECTION INTRAVENOUS; SUBCUTANEOUS at 13:00

## 2023-02-14 RX ADMIN — OXYCODONE HYDROCHLORIDE 10 MG: 10 TABLET ORAL at 22:03

## 2023-02-14 RX ADMIN — VANCOMYCIN HYDROCHLORIDE 1000 MG: 1 INJECTION, SOLUTION INTRAVENOUS at 07:50

## 2023-02-14 RX ADMIN — CEFEPIME HYDROCHLORIDE 500 MG: 1 INJECTION, POWDER, FOR SOLUTION INTRAMUSCULAR; INTRAVENOUS at 01:30

## 2023-02-14 RX ADMIN — METOPROLOL SUCCINATE 25 MG: 25 TABLET, EXTENDED RELEASE ORAL at 08:02

## 2023-02-14 RX ADMIN — HEPARIN SODIUM 5000 UNITS: 5000 INJECTION INTRAVENOUS; SUBCUTANEOUS at 22:01

## 2023-02-14 RX ADMIN — SODIUM BICARBONATE 1300 MG: 650 TABLET ORAL at 07:52

## 2023-02-14 RX ADMIN — PIPERACILLIN AND TAZOBACTAM 2.25 G: 2; .25 INJECTION, POWDER, FOR SOLUTION INTRAVENOUS at 21:00

## 2023-02-14 RX ADMIN — HEPARIN SODIUM 5000 UNITS: 5000 INJECTION INTRAVENOUS; SUBCUTANEOUS at 05:52

## 2023-02-14 NOTE — PROGRESS NOTES
Progress Note - Nephrology   Sarah Vizcaino 59 y o  male MRN: [id number]  Unit/Bed#: -01 Encounter: [id number]    A/P:  1   Acute kidney injury on top of chronic kidney disease              Creatinine reviewed at 2 62 mg/dL, continue current nephrotoxins and optimize care in general   IV fluids were discontinued earlier today  2   Chronic kidney disease stage IV with baseline creatinine between 2 9-3 mg/dL  3   Hypertension the setting of chronic kidney disease stage IV              Blood pressure is well controlled at this time, continue current medications  4   Metabolic acidosis              Patient placed on sodium bicarbonate 2 tablets twice daily, serum bicarb has improved up to 19 mmol/L  We will continue with this dosing for 1 more day, look to reduce down to 1 tablet twice daily if clinically indicated by tomorrow  5   Perianal abscess              Transition to piperacillin/tazobactam, status post incision and drainage by our surgical colleagues  Continue local care according to the recommendations  6  Chronic pilonidal cyst  7   History of bladder cancer status post cystectomy and ilial conduit      Follow up reason for today's visit: Acute kidney injury/chronic kidney disease/hypertension    Pilonidal disease    Patient Active Problem List   Diagnosis   • Perianal abscess   • Pilonidal disease   • BALDEMAR (acute kidney injury) ([address] )   • Polyarthralgia   • History of bladder cancer   • Primary hypertension         Subjective:   Patient feeling significantly improved in general   Denies nausea or vomiting  Objective:     Vitals: Blood pressure 115/75, pulse 68, temperature 97 6 °F (36 4 °C), resp  rate 16, height 5' 9" (1 753 m), weight 78 kg (172 lb), SpO2 93 %  ,Body mass index is 25 4 kg/m²      Weight (last 2 days)     Date/Time Weight    02/12/23 1121 78 (172)            Intake/Output Summary (Last 24 hours) at 2/14/2023 1455  Last data filed at 2/14/2023 1303  Gross per 24 hour   Intake 2151 67 ml   Output 4050 ml   Net -1898 33 ml     I/O last 3 completed shifts: In: 3100 [I V :2900; IV Piggyback:200]  Out: 9148 [Urine:5350]         Physical Exam: /75   Pulse 68   Temp 97 6 °F (36 4 °C)   Resp 16   Ht 5' 9" (1 753 m)   Wt 78 kg (172 lb)   SpO2 93%   BMI 25 40 kg/m²     General Appearance:    Alert, cooperative, no distress, appears stated age   Head:    Normocephalic, without obvious abnormality, atraumatic   Eyes:    Conjunctiva/corneas clear   Ears:    Normal external ears   Nose:   Nares normal, septum midline, mucosa normal, no drainage    or sinus tenderness   Throat:   Lips, mucosa, and tongue normal; teeth and gums normal   Neck:   Supple   Back:     Symmetric, no curvature, ROM normal, no CVA tenderness   Lungs:     Clear to auscultation bilaterally, respirations unlabored   Chest wall:    No tenderness or deformity   Heart:    Regular rate and rhythm, S1 and S2 normal, no murmur, rub   or gallop   Abdomen:     Soft, non-tender, bowel sounds active   Extremities:   Extremities normal, atraumatic, no cyanosis or edema   Skin:   Skin color, texture, turgor normal, no rashes or lesions   Lymph nodes:   Cervical normal   Neurologic:   CNII-XII intact            Lab, Imaging and other studies: I have personally reviewed pertinent labs  CBC:   Lab Results   Component Value Date    WBC 10 91 (H) 02/14/2023    HGB 12 5 02/14/2023    HCT 39 5 02/14/2023    MCV 91 02/14/2023     02/14/2023    MCH 28 9 02/14/2023    MCHC 31 6 02/14/2023    RDW 14 6 02/14/2023    MPV 8 8 (L) 02/14/2023    NRBC 0 02/14/2023     CMP:   Lab Results   Component Value Date    K 3 3 (L) 02/14/2023     (H) 02/14/2023    CO2 19 (L) 02/14/2023    BUN 37 (H) 02/14/2023    CREATININE 2 62 (H) 02/14/2023    CALCIUM 8 4 02/14/2023    EGFR 25 02/14/2023           Results from last 7 days   Lab Units 02/14/23  0430 02/13/23  0451 02/12/23  1226   POTASSIUM mmol/L 3 3* 3 5 3 5   CHLORIDE mmol/L 112* 109* 106   CO2 mmol/L 19* 16* 18*   BUN mg/dL 37* 42* 47*   CREATININE mg/dL 2 62* 2 94* 3 55*   CALCIUM mg/dL 8 4 8 5 9 4   ALK PHOS U/L  --  80 102   ALT U/L  --  8 10   AST U/L  --  8* 11*         Phosphorus: No results found for: PHOS  Magnesium: No results found for: MG  Urinalysis: No results found for: COLORU, CLARITYU, SPECGRAV, PHUR, LEUKOCYTESUR, NITRITE, PROTEINUA, GLUCOSEU, KETONESU, BILIRUBINUR, BLOODU  Ionized Calcium: No results found for: CAION  Coagulation: No results found for: PT, INR, APTT  Troponin: No results found for: TROPONINI  ABG: No results found for: PHART, YFB0BBI, PO2ART, YBH1OSQ, F6DCNVZF, BEART, SOURCE  Radiology review:     IMAGING  Procedure: CT pelvis w contrast    Result Date: 2/12/2023  Narrative: CT PELVIS WITH IV CONTRAST INDICATION:   Large pilonidal sinus versus abscess, surgical planning  COMPARISON:  CT abdomen pelvis 6/30/2016  TECHNIQUE: CT examination of the pelvis was performed  Axial, sagittal, and coronal 2D reformatted images were created from the source data and submitted for interpretation  Radiation dose length product (DLP) for this visit:  407 mGy-cm   This examination, like all CT scans performed in the [facility], was performed utilizing techniques to minimize radiation dose exposure, including the use of iterative reconstruction and automated exposure control  IV Contrast:  100 mL of iohexol (OMNIPAQUE) Enteric Contrast:  Enteric contrast was not administered  FINDINGS: VISUALIZED KIDNEYS/URETERS:  Ileal conduit on the right  REPRODUCTIVE ORGANS:  Unremarkable for patient's age  URINARY BLADDER:  Surgically absent  APPENDIX:  No findings to suggest appendicitis  VISUALIZED BOWEL:  Right lower quadrant ostomy  ABDOMINOPELVIC CAVITY:  No ascites or free intraperitoneal air  No lymphadenopathy  VISUALIZED VESSELS:  The left external iliac artery appears nearly occluded proximally but appears patent distally    ABDOMINOPELVIC WALL/INGUINAL REGIONS: Large collection in the left posterior perineal and buttock region medially  Inferior portion of the collection measures 6 0 x 3 2 cm image 110 series 2  This is in close proximity to the anorectal junction  Associated skin thickening and infiltration  Posteriorly the skin thickening and inflammation extends adjacent to the distal coccyx  OSSEOUS STRUCTURES:  No acute fracture or destructive osseous lesion  Impression: 1  Large collection in the left posterior perineal and buttock region medially  This is in close proximity to the anorectal junction  This is most likely a perianal abscess  2   The left external iliac artery appears nearly occluded proximally but appears patent distally  The study was marked in [facility] for immediate notification   Workstation performed: [id number]       Current Facility-Administered Medications   Medication Dose Route Frequency   • acetaminophen (TYLENOL) tablet 650 mg  650 mg Oral Q6H PRN   • famotidine (PEPCID) tablet 20 mg  20 mg Oral Daily   • heparin (porcine) subcutaneous injection 5,000 Units  5,000 Units Subcutaneous Q8H [facility]   • HYDROmorphone HCl (DILAUDID) injection 0 2 mg  0 2 mg Intravenous Q4H PRN   • metoprolol succinate (TOPROL-XL) 24 hr tablet 25 mg  25 mg Oral Daily   • nicotine (NICODERM CQ) 14 mg/24hr TD 24 hr patch 1 patch  1 patch Transdermal Daily   • ondansetron (ZOFRAN) injection 4 mg  4 mg Intravenous Q6H PRN   • oxyCODONE (ROXICODONE) immediate release tablet 10 mg  10 mg Oral Q6H PRN   • oxyCODONE (ROXICODONE) IR tablet 5 mg  5 mg Oral Q3H PRN   • piperacillin-tazobactam (ZOSYN) IVPB 2 25 g  2 25 g Intravenous Q6H   • polyethylene glycol (MIRALAX) packet 17 g  17 g Oral Daily   • predniSONE tablet 1 mg  1 mg Oral Daily   • psyllium (METAMUCIL) 1 packet  1 packet Oral Daily   • sodium bicarbonate tablet 1,300 mg  1,300 mg Oral BID after meals     Medications Discontinued During This Encounter   Medication Reason   • cefepime (MAXIPIME) IVPB (premix in dextrose) 1,000 mg 50 mL    • bupivacaine (MARCAINE) 0 5 % injection Patient Discharge   • sodium chloride 0 9 % irrigation solution Patient Discharge   • fentaNYL (SUBLIMAZE) injection 25 mcg Patient Transfer   • HYDROmorphone (DILAUDID) injection 0 2 mg Patient Transfer   • ondansetron (ZOFRAN) injection 4 mg Patient Transfer   • hydrocortisone (Solu-CORTEF) injection 50 mg    • docusate sodium (COLACE) capsule 100 mg    • multi-electrolyte (PLASMALYTE-A/ISOLYTE-S PH 7 4) IV solution    • cefepime (MAXIPIME) 500 mg in sodium chloride 0 9 % 50 mL IVPB    • vancomycin (VANCOCIN) IVPB (premix in dextrose) 1,000 mg 200 mL    • HYDROmorphone (DILAUDID) injection 0 5 mg    • famotidine (PEPCID) tablet 20 mg        Daryl James DO      This progress note was produced in part using a dictation device which may document imprecise wording from author's original intent

## 2023-02-14 NOTE — ASSESSMENT & PLAN NOTE
· Due to soft BP prior to procedure, will hold off on amlodipine 10 mg daily  · Toprol resumed with holding parameters

## 2023-02-14 NOTE — ASSESSMENT & PLAN NOTE
Patient presented with worsening pilonidal and left perianal pain, swelling, erythema, and purulent drainage over the past 1 to 2 weeks  · He is status post I&D on 2/12/2023  · Treatment per primary team  · Cefepime/vancomycin discontinued    Patient started on Zosyn based on culture results

## 2023-02-14 NOTE — PROGRESS NOTES
Progress Note - General Surgery   [facility] 59 [facility] MRN: [id number]  Unit/Bed#: -Giovani Encounter: [id number]    ASSESSMENT:  58 y/o M w/ hx prior bladder cancer status post chemotherapy, cystectomy, ileal conduit, additional chemotherapy , known CKD4, who presented with acute kidney injury on top of known CKD and worsening pilonidal disease with development of perianal abscess      Pilonidal disease   Perianal abscess, now POD#2 s/p I&D in the OR  -AF, VSS  -Leukocytosis resolving: WBC trend 10 91,14 19, 19 63  -Hypokalemia, 3 3  -Intraop wound cx: GNR, G+ cocci  -Anaerobic cx: bacteroides fragilis  -Blood cx NGTD     BALDEMAR on CKD   -Cr down-trendin 62, 2 94, 3 55  -UO: 4L, urine yellow/clear in urostomy bag  -Int Med and Nephro following, appreciate recs      Adrenal Insufficiency  -On long term steroids for polymyalgia rheumatica, currently being weaned off with taper, follows with rheum outpatient  -Recieved stress dose during surgery  -Int Med following for management, appreciate assistance    PLAN:  -IV abx, pharmacy recommended 2 25 zosyn q 6 hrs based off preliminary culture results/anaerobic growth of Bacteriodes  FU final C&S  -IV fluids d/c today, pt making good urine output, Cr much improved  -Regular diet as tolerated  -Analgesia prn, transitioned to PO with IV for breakthrough  One time dose of IV will be given prior to packing change  -DVT ppx, SQH  -Continue daily metamucil and colace  Monitor for bowel function    -Daily packing changes  To meet with patient's wife at 1pm today for education on wound care     -Dispo planning: Appreciate CM assistance  Pt is set up with [facility] for packing changes upon d/c  Anticipate d/c within next 24-48 hrs once final cultures result  SUBJECTIVE:  Doing well, no acute complaints  Some soreness of the L buttock, improves with pain medication  Tolerating regular diet, no n/v  Voiding well  Passing flatus, no BM yet       OBJECTIVE: Vitals:  Blood pressure 115/75, pulse 68, temperature 97 6 °F (36 4 °C), resp  rate 16, height 5' 9" (1 753 m), weight 78 kg (172 lb), SpO2 93 %  ,Body mass index is 25 4 kg/m²  I/Os:    Intake/Output Summary (Last 24 hours) at 2/14/2023 1238  Last data filed at 2/14/2023 1153  Gross per 24 hour   Intake 1420 ml   Output 3350 ml   Net -1930 ml       Lines/Drains:  Invasive Devices     Peripheral Intravenous Line  Duration           Peripheral IV 02/12/23 Left Antecubital 2 days    Peripheral IV 02/12/23 Right Antecubital 1 day          Drain  Duration           Urostomy RLQ -- days                Physical Exam  Constitutional:       General: He is not in acute distress  HENT:      Head: Normocephalic and atraumatic  Cardiovascular:      Rate and Rhythm: Normal rate and regular rhythm  Heart sounds: No murmur heard  Pulmonary:      Effort: Pulmonary effort is normal       Breath sounds: No wheezing, rhonchi or rales  Abdominal:      General: There is no distension  Palpations: Abdomen is soft  Tenderness: There is no abdominal tenderness  There is no guarding or rebound  Musculoskeletal:         General: No tenderness  Right lower leg: No edema  Left lower leg: No edema  Skin:     General: Skin is warm and dry  Neurological:      Mental Status: He is alert  Diagnostics:  I have personally reviewed pertinent lab results  CT pelvis w contrast    Result Date: 2/12/2023  Impression: 1  Large collection in the left posterior perineal and buttock region medially  This is in close proximity to the anorectal junction  This is most likely a perianal abscess  2   The left external iliac artery appears nearly occluded proximally but appears patent distally  The study was marked in [facility] for immediate notification   Workstation performed: [id number]     Recent Results (from the past 36 hour(s))   CBC and differential    Collection Time: 02/13/23  4:51 AM   Result Value Ref Range    WBC 14 19 (H) 4 31 - 10 16 Thousand/uL    RBC 4 63 3 88 - 5 62 Million/uL    Hemoglobin 13 4 12 0 - 17 0 g/dL    Hematocrit 41 8 36 5 - 49 3 %    MCV 90 82 - 98 fL    MCH 28 9 26 8 - 34 3 pg    MCHC 32 1 31 4 - 37 4 g/dL    RDW 14 7 11 6 - 15 1 %    MPV 8 8 (L) 8 9 - 12 7 fL    Platelets 925 298 - 611 Thousands/uL    nRBC 0 /100 WBCs    Neutrophils Relative 83 (H) 43 - 75 %    Immat GRANS % 1 0 - 2 %    Lymphocytes Relative 9 (L) 14 - 44 %    Monocytes Relative 7 4 - 12 %    Eosinophils Relative 0 0 - 6 %    Basophils Relative 0 0 - 1 %    Neutrophils Absolute 11 74 (H) 1 85 - 7 62 Thousands/µL    Immature Grans Absolute 0 11 0 00 - 0 20 Thousand/uL    Lymphocytes Absolute 1 30 0 60 - 4 47 Thousands/µL    Monocytes Absolute 0 93 0 17 - 1 22 Thousand/µL    Eosinophils Absolute 0 05 0 00 - 0 61 Thousand/µL    Basophils Absolute 0 06 0 00 - 0 10 Thousands/µL   Comprehensive metabolic panel    Collection Time: 02/13/23  4:51 AM   Result Value Ref Range    Sodium 135 135 - 147 mmol/L    Potassium 3 5 3 5 - 5 3 mmol/L    Chloride 109 (H) 96 - 108 mmol/L    CO2 16 (L) 21 - 32 mmol/L    ANION GAP 10 4 - 13 mmol/L    BUN 42 (H) 5 - 25 mg/dL    Creatinine 2 94 (H) 0 60 - 1 30 mg/dL    Glucose 144 (H) 65 - 140 mg/dL    Calcium 8 5 8 4 - 10 2 mg/dL    Corrected Calcium 9 2 8 3 - 10 1 mg/dL    AST 8 (L) 13 - 39 U/L    ALT 8 7 - 52 U/L    Alkaline Phosphatase 80 34 - 104 U/L    Total Protein 6 2 (L) 6 4 - 8 4 g/dL    Albumin 3 1 (L) 3 5 - 5 0 g/dL    Total Bilirubin 0 29 0 20 - 1 00 mg/dL    eGFR 22 ml/min/1 73sq m   Magnesium    Collection Time: 02/13/23  4:51 AM   Result Value Ref Range    Magnesium 2 3 1 9 - 2 7 mg/dL   Phosphorus    Collection Time: 02/13/23  4:51 AM   Result Value Ref Range    Phosphorus 3 7 2 7 - 4 5 mg/dL   Vancomycin, random    Collection Time: 02/13/23  4:51 AM   Result Value Ref Range    Vancomycin Rm 9 9 (L) 10 0 - 20 0 ug/mL   UA w Reflex to Microscopic w Reflex to Culture    Collection Time: 02/13/23  7:41 AM    Specimen: Urine, Other   Result Value Ref Range    Color, UA Straw Yellow, Straw    Clarity, UA Clear Hazy, Clear    Specific [place], UA 1 010 >1 005 - <1 030    pH, UA 7 0 5 0, 5 5, 6 0, 6 5, 7 0, 7 5    Leukocytes, UA 1+ (A) Negative    Nitrite, UA Negative Negative    Protein, UA Negative Negative, Interference- unable to analyze mg/dl    Glucose, UA Negative Negative mg/dl    Ketones, UA Negative Negative mg/dl    Urobilinogen, UA 0 2 0 2, 1 0 E U /dl E U /dl    Bilirubin, UA Negative Negative    Occult Blood, UA Trace-Intact (A) Negative   Urine Microscopic    Collection Time: 02/13/23  7:41 AM   Result Value Ref Range    RBC, UA 0-1 None Seen, 0-1, 1-2, 2-4, 0-5 /hpf    WBC, UA 4-10 (A) None Seen, 0-1, 1-2, 0-5, 2-4 /hpf    Epithelial Cells Occasional None Seen, Occasional /hpf    Bacteria, UA None Seen None Seen, Occasional /hpf    Fine granular casts 0-1 /lpf   Beta Hydroxybutyrate    Collection Time: 02/13/23 12:35 PM   Result Value Ref Range    BETA-HYDROXYBUTYRATE 0 1 <0 6 mmol/L   Blood gas, venous    Collection Time: 02/13/23  8:47 PM   Result Value Ref Range    pH, Jerry 7 323 7 300 - 7 400    pCO2, Jerry 38 3 (L) 42 0 - 50 0 mm Hg    pO2, Jerry 123 8 (H) 35 0 - 45 0 mm Hg    HCO3, Jerry 19 4 (L) 24 - 30 mmol/L    Base Excess, Jerry -6 0 mmol/L    O2 Content, Jerry 18 3 ml/dL    O2 HGB, VENOUS 96 8 (H) 60 0 - 80 0 %   CBC and differential    Collection Time: 02/14/23  4:30 AM   Result Value Ref Range    WBC 10 91 (H) 4 31 - 10 16 Thousand/uL    RBC 4 32 3 88 - 5 62 Million/uL    Hemoglobin 12 5 12 0 - 17 0 g/dL    Hematocrit 39 5 36 5 - 49 3 %    MCV 91 82 - 98 fL    MCH 28 9 26 8 - 34 3 pg    MCHC 31 6 31 4 - 37 4 g/dL    RDW 14 6 11 6 - 15 1 %    MPV 8 8 (L) 8 9 - 12 7 fL    Platelets 430 788 - 833 Thousands/uL    nRBC 0 /100 WBCs    Neutrophils Relative 56 43 - 75 %    Immat GRANS % 1 0 - 2 %    Lymphocytes Relative 26 14 - 44 %    Monocytes Relative 10 4 - 12 %    Eosinophils Relative 6 0 - 6 %    Basophils Relative 1 0 - 1 %    Neutrophils Absolute 6 15 1 85 - 7 62 Thousands/µL    Immature Grans Absolute 0 09 0 00 - 0 20 Thousand/uL    Lymphocytes Absolute 2 80 0 60 - 4 47 Thousands/µL    Monocytes Absolute 1 13 0 17 - 1 22 Thousand/µL    Eosinophils Absolute 0 61 0 00 - 0 61 Thousand/µL    Basophils Absolute 0 13 (H) 0 00 - 0 10 Thousands/µL   Basic metabolic panel    Collection Time: 02/14/23  4:30 AM   Result Value Ref Range    Sodium 140 135 - 147 mmol/L    Potassium 3 3 (L) 3 5 - 5 3 mmol/L    Chloride 112 (H) 96 - 108 mmol/L    CO2 19 (L) 21 - 32 mmol/L    ANION GAP 9 4 - 13 mmol/L    BUN 37 (H) 5 - 25 mg/dL    Creatinine 2 62 (H) 0 60 - 1 30 mg/dL    Glucose 94 65 - 140 mg/dL    Calcium 8 4 8 4 - 10 2 mg/dL    eGFR 25 ml/min/1 73sq m   Vancomycin, random    Collection Time: 02/14/23  4:33 AM   Result Value Ref Range    Vancomycin Rm 13 6 10 0 - 20 0 ug/mL       Current Medications:  Scheduled Meds:  Current Facility-Administered Medications   Medication Dose Route Frequency Provider Last Rate   • acetaminophen  650 mg Oral Q6H PRN Emelia Interiano MD     • cefepime  500 mg Intravenous Q24H Emelia Interiano  mg (02/14/23 0130)   • heparin (porcine)  5,000 Units Subcutaneous Q8H [facility] Emelia Interiano MD     • HYDROmorphone  0 5 mg Intravenous Q3H PRN Emelia Interiano MD     • metoprolol succinate  25 mg Oral Daily Herman Titus MD     • nicotine  1 patch Transdermal Daily Emelia Interiano MD     • ondansetron  4 mg Intravenous Q6H PRN Emelia Interiano MD     • oxyCODONE  5 mg Oral Q3H PRN Emelia Interiano MD     • polyethylene glycol  17 g Oral Daily Selin Brink PA-C     • potassium chloride  20 mEq Oral Once LOU Singh     • predniSONE  1 mg Oral Daily VANESA Gonsalez     • psyllium  1 packet Oral Daily Selin Brink PA-C     • sodium bicarbonate  1,300 mg Oral BID after meals Amirah Keith DO     • vancomycin  12 5 mg/kg Intravenous Once PRN Emelia Interiano MD       Continuous Infusions:   PRN Meds:  •  acetaminophen  •  HYDROmorphone  •  ondansetron  •  oxyCODONE  •  vancomycin      LOU Singh  2/14/2023

## 2023-02-14 NOTE — PROGRESS NOTES
[facility]  Progress Note - Jeffry Sade 1965, 59 y o  male MRN: [id number]  Unit/Bed#: -01 Encounter: [id number]  Primary Care Provider: Audelia Conner DO   Date and time admitted to hospital: 2/12/2023 11:20 AM    * Pilonidal disease  Assessment & Plan  Patient presented with worsening pilonidal and left perianal pain, swelling, erythema, and purulent drainage over the past 1 to 2 weeks  · He is status post I&D on 2/12/2023  · Treatment per primary team  · Cefepime/vancomycin discontinued  Patient started on Zosyn based on culture results    BALDEMAR (acute kidney injury) ([facility] [address] )  Assessment & Plan  · Likely multifactorial due to infection as well as dehydration  · Patient also received IV contrast for CT imaging  · Creatinine appears to be back to baseline  · Patient with chronic kidney disease stage IV with baseline creatinine between 2 9 to 3 mg/dL  · Nephrology following  · Avoid hypotension and nephrotoxins  · Monitor renal function closely    [address] [place] [address]  · Diagnosed approximately 1 year ago  · Follows with [facility]ology at [facility]  · The patient was started on prednisone taper course by 1 mg every month  Currently at 1 mg daily  · Noted to have soft blood pressure readings in the OR  · Patient has been receiving stress dose steroids  · Home prednisone dose resumed on 2/14/2023      Primary hypertension  Assessment & Plan  · Due to soft BP prior to procedure, will hold off on amlodipine 10 mg daily  · Toprol resumed with holding parameters    History of bladder cancer  Assessment & Plan  · Status post cystectomy and ileal conduit  · Continue supportive care      VTE Prophylaxis:  Heparin    Patient Centered Rounds: I have performed bedside rounds with nursing staff today      Discussions with Specialists or Other Care Team Provider: yes  Education and Discussions with Family / Patient: Updated patient regarding plan of care    Current Length of Stay: 2 day(s)    Current Patient Status: Inpatient   Certification Statement: The patient will continue to require additional inpatient hospital stay due to Perianal abscess    Discharge Plan: Per primary team    Code Status: Level 1 - Full Code    Subjective:   Antibiotics transition to Zosyn  Patient states he is feeling well today  Objective:     Vitals:   Temp (24hrs), Av 1 °F (36 7 °C), Min:97 6 °F (36 4 °C), Max:98 6 °F (37 °C)    Temp:  [97 6 °F (36 4 °C)-98 6 °F (37 °C)] 97 6 °F (36 4 °C)  HR:  [62-80] 68  Resp:  [16-20] 16  BP: ()/(53-77) 115/75  SpO2:  [93 %-97 %] 93 %  Body mass index is 25 4 kg/m²  Input and Output Summary (last 24 hours): Intake/Output Summary (Last 24 hours) at 2023 1428  Last data filed at 2023 1303  Gross per 24 hour   Intake 2151 67 ml   Output 4050 ml   Net -1898 33 ml       Physical Exam:   Physical Exam  Constitutional:       General: He is not in acute distress  HENT:      Head: Normocephalic and atraumatic  Nose: Nose normal       Mouth/Throat:      Mouth: Mucous membranes are moist    Eyes:      Extraocular Movements: Extraocular movements intact  Conjunctiva/sclera: Conjunctivae normal    Cardiovascular:      Rate and Rhythm: Normal rate and regular rhythm  Pulmonary:      Effort: Pulmonary effort is normal  No respiratory distress  Abdominal:      Palpations: Abdomen is soft  Tenderness: There is no abdominal tenderness  Musculoskeletal:         General: Normal range of motion  Cervical back: Normal range of motion and neck supple  Skin:     General: Skin is warm and dry  Neurological:      General: No focal deficit present  Mental Status: He is alert  Mental status is at baseline  Cranial Nerves: No cranial nerve deficit     Psychiatric:         Mood and Affect: Mood normal          Behavior: Behavior normal          Additional Data:     Labs:    Results from last 7 days   Lab Units 23  0430   WBC Thousand/uL 10 91*   HEMOGLOBIN g/dL 12 5   HEMATOCRIT % 39 5   PLATELETS Thousands/uL 327   NEUTROS PCT % 56   LYMPHS PCT % 26   MONOS PCT % 10   EOS PCT % 6     Results from last 7 days   Lab Units 02/14/23  0430 02/13/23  0451   SODIUM mmol/L 140 135   POTASSIUM mmol/L 3 3* 3 5   CHLORIDE mmol/L 112* 109*   CO2 mmol/L 19* 16*   BUN mg/dL 37* 42*   CREATININE mg/dL 2 62* 2 94*   CALCIUM mg/dL 8 4 8 5   ALK PHOS U/L  --  80   ALT U/L  --  8   AST U/L  --  8*     Results from last 7 days   Lab Units 02/12/23  1226   INR  1 08               * I Have Reviewed All Lab Data Listed Above  * Additional Pertinent Lab Tests Reviewed: [address] Admission  Reviewed    Imaging:  Imaging Reports Reviewed Today Include: No new imaging    Recent Cultures (last 7 days):     Results from last 7 days   Lab Units 02/12/23  1633 02/12/23  1632 02/12/23  1226   BLOOD CULTURE   --   --  No Growth at 24 hrs  No Growth at 24 hrs  GRAM STAIN RESULT  3+ Gram negative rods*  2+ Gram positive cocci in pairs*  2+ Polys* 2+ Polys  No organisms seen  --    WOUND CULTURE  Culture results to follow  Culture results to follow    --        Last 24 Hours Medication List:   Current Facility-Administered Medications   Medication Dose Route Frequency Provider Last Rate   • acetaminophen  650 mg Oral Q6H PRN Joao Renner MD     • famotidine  20 mg Oral Daily Selin Brink PA-C     • heparin (porcine)  5,000 Units Subcutaneous [facility] Manual MD Zurdo     • HYDROmorphone  0 2 mg Intravenous Q4H PRN Selin Brink PA-C     • metoprolol succinate  25 mg Oral Daily Perlita Rushing MD     • nicotine  1 patch Transdermal Daily Joao Renner MD     • ondansetron  4 mg Intravenous Q6H PRN Joao Renner MD     • oxyCODONE  10 mg Oral Q6H PRN Selin Brink PA-C     • oxyCODONE  5 mg Oral Q3H PRN Joao Renner MD     • piperacillin-tazobactam  2 25 g Intravenous Q6H Selin Brink PA-C     • polyethylene glycol  17 g Oral Daily Selin Brink PA-C     • predniSONE  1 mg Oral Daily VANESA Miranda     • psyllium  1 packet Oral Daily Selin Brink PA-C     • sodium bicarbonate  1,300 mg Oral BID after meals Maryan Jackson DO          Today, Patient Was Seen By: Josie Camp MD    ** Please Note: Dictation voice to text software may have been used in the creation of this document   **

## 2023-02-14 NOTE — PLAN OF CARE
Problem: PAIN - ADULT  Goal: Verbalizes/displays adequate comfort level or baseline comfort level  Description: Interventions:  - Encourage patient to monitor pain and request assistance  - Assess pain using appropriate pain scale  - Administer analgesics based on type and severity of pain and evaluate response  - Implement non-pharmacological measures as appropriate and evaluate response  - Consider cultural and social influences on pain and pain management  - Notify physician/advanced practitioner if interventions unsuccessful or patient reports new pain  Outcome: Progressing     Problem: INFECTION - ADULT  Goal: Absence or prevention of progression during hospitalization  Description: INTERVENTIONS:  - Assess and monitor for signs and symptoms of infection  - Monitor lab/diagnostic results  - Monitor all insertion sites, i e  indwelling lines, tubes, and drains  - Monitor endotracheal if appropriate and nasal secretions for changes in amount and color  - [place] appropriate cooling/warming therapies per order  - Administer medications as ordered  - Instruct and encourage patient and family to use good hand hygiene technique  - Identify and instruct in appropriate isolation precautions for identified infection/condition  Outcome: Progressing  Goal: Absence of fever/infection during neutropenic period  Description: INTERVENTIONS:  - Monitor WBC    Outcome: Progressing     Problem: SAFETY ADULT  Goal: Patient will remain free of falls  Description: INTERVENTIONS:  - Educate patient/family on patient safety including physical limitations  - Instruct patient to call for assistance with activity   - Consult OT/PT to assist with strengthening/mobility   - Keep Call bell within reach  - Keep bed low and locked with side rails adjusted as appropriate  - Keep care items and personal belongings within reach  - Initiate and maintain comfort rounds  - Make Fall Risk Sign visible to staff  - Offer Toileting every 2 Hours, in advance of need  - Initiate/Maintain bed/chair alarm  - Obtain necessary fall risk management equipment: bed in lowest and locked position, call bell within reach  - Apply yellow socks and bracelet for high fall risk patients  - Consider moving patient to room near nurses station  Outcome: Progressing  Goal: Maintain or return to baseline ADL function  Description: INTERVENTIONS:  -  Assess patient's ability to carry out ADLs; assess patient's baseline for ADL function and identify physical deficits which impact ability to perform ADLs (bathing, care of mouth/teeth, toileting, grooming, dressing, etc )  - Assess/evaluate cause of self-care deficits   - Assess range of motion  - Assess patient's mobility; develop plan if impaired  - Assess patient's need for assistive devices and provide as appropriate  - Encourage maximum independence but intervene and supervise when necessary  - Involve family in performance of ADLs  - Assess for home care needs following discharge   - Consider OT consult to assist with ADL evaluation and planning for discharge  - Provide patient education as appropriate  Outcome: Progressing  Goal: Maintains/Returns to pre admission functional level  Description: INTERVENTIONS:  - Perform BMAT or MOVE assessment daily    - Set and communicate daily mobility goal to care team and patient/family/caregiver  - Collaborate with rehabilitation services on mobility goals if consulted  - Perform Range of Motion 3 times a day  - Reposition patient every 2 hours    - Dangle patient 3 times a day  - Stand patient 3 times a day  - Ambulate patient 3 times a day  - Out of bed to chair 3 times a day   - Out of bed for meals 3 times a day  - Out of bed for toileting  - Record patient progress and toleration of activity level   Outcome: Progressing     Problem: DISCHARGE PLANNING  Goal: Discharge to home or other facility with appropriate resources  Description: INTERVENTIONS:  - Identify barriers to discharge w/patient and caregiver  - Arrange for needed discharge resources and transportation as appropriate  - Identify discharge learning needs (meds, wound care, etc )  - Arrange for interpretive services to assist at discharge as needed  - Refer to Case Management Department for coordinating discharge planning if the patient needs post-hospital services based on physician/advanced practitioner order or complex needs related to functional status, cognitive ability, or social support system  Outcome: Progressing     Problem: Knowledge Deficit  Goal: Patient/family/caregiver demonstrates understanding of disease process, treatment plan, medications, and discharge instructions  Description: Complete learning assessment and assess knowledge base  Interventions:  - Provide teaching at level of understanding  - Provide teaching via preferred learning methods  Outcome: Progressing     Problem: Nutrition/Hydration-ADULT  Goal: Nutrient/Hydration intake appropriate for improving, restoring or maintaining nutritional needs  Description: Monitor and assess patient's nutrition/hydration status for malnutrition  Collaborate with interdisciplinary team and initiate plan and interventions as ordered  Monitor patient's weight and dietary intake as ordered or per policy  Utilize nutrition screening tool and intervene as necessary  Determine patient's food preferences and provide high-protein, high-caloric foods as appropriate       INTERVENTIONS:  - Monitor oral intake, urinary output, labs, and treatment plans  - Assess nutrition and hydration status and recommend course of action  - Evaluate amount of meals eaten  - Assist patient with eating if necessary   - Allow adequate time for meals  - Recommend/ encourage appropriate diets, oral nutritional supplements, and vitamin/mineral supplements  - Order, calculate, and assess calorie counts as needed  - Recommend, monitor, and adjust tube feedings and TPN/PPN based on assessed needs  - Assess need for intravenous fluids  - Provide specific nutrition/hydration education as appropriate  - Include patient/family/caregiver in decisions related to nutrition  Outcome: Progressing

## 2023-02-14 NOTE — CASE MANAGEMENT
Case Management Discharge Planning Note    Patient name Sabrina Bell /-27 MRN [id number]  : 1965 Date 2023       Current Admission Date: 2023  Current Admission Diagnosis:Pilonidal disease   Patient Active Problem List    Diagnosis Date Noted   • BALDEMAR (acute kidney injury) ([facility] [address] ) 2023   • Polyarthralgia 2023   • History of bladder cancer 2023   • Primary hypertension 2023   • Perianal abscess    • Pilonidal disease       LOS (days): 2  Geometric Mean LOS (GMLOS) (days): 4 00  Days to GMLOS:2 1     OBJECTIVE:  Risk of Unplanned Readmission Score: 13 04         Current admission status: Inpatient   Preferred Pharmacy:   [facility]/pharmacy #[id number]- [address], [address]  [address] [facility] [address]  Phone: [phone] Fax: [address] [place] [address] Mikel Campos [address] [facility]  Phone: [phone] Fax: [phone]    Primary Care Provider: Dianne Esquivel DO    Primary Insurance: MEDICARE  Secondary Insurance:     DISCHARGE DETAILS:    Discharge planning discussed with[de-identified] Patient          [place] Name[de-identified] [id number]         Other Referral/Resources/Interventions Provided:  Interventions: [facility]  Referral Comments: Pt has been accepted by [facility] for [place] [address] services  Pt aware and in agreement  CM reserved [facility] via [employer]

## 2023-02-14 NOTE — PLAN OF CARE
Problem: SAFETY ADULT  Goal: Patient will remain free of falls  Description: INTERVENTIONS:  - Educate patient/family on patient safety including physical limitations  - Instruct patient to call for assistance with activity   - Consult OT/PT to assist with strengthening/mobility   - Keep Call bell within reach  - Keep bed low and locked with side rails adjusted as appropriate  - Keep care items and personal belongings within reach  - Initiate and maintain comfort rounds  - Make Fall Risk Sign visible to staff  - Offer Toileting every 2 Hours, in advance of need  - Initiate/Maintain bed/chair alarm  - Obtain necessary fall risk management equipment: bed in lowest and locked position, call bell within reach  - Apply yellow socks and bracelet for high fall risk patients  - Consider moving patient to room near nurses station  Outcome: Progressing  Goal: Maintain or return to baseline ADL function  Description: INTERVENTIONS:  -  Assess patient's ability to carry out ADLs; assess patient's baseline for ADL function and identify physical deficits which impact ability to perform ADLs (bathing, care of mouth/teeth, toileting, grooming, dressing, etc )  - Assess/evaluate cause of self-care deficits   - Assess range of motion  - Assess patient's mobility; develop plan if impaired  - Assess patient's need for assistive devices and provide as appropriate  - Encourage maximum independence but intervene and supervise when necessary  - Involve family in performance of ADLs  - Assess for home care needs following discharge   - Consider OT consult to assist with ADL evaluation and planning for discharge  - Provide patient education as appropriate  Outcome: Progressing

## 2023-02-14 NOTE — NUTRITION
02/14/23 1515   Biochemical Data,Medical Tests, and Procedures   Biochemical Data/Medical Tests/Procedures Lab values reviewed; Meds reviewed   Nutrition-Focused Physical Exam   Nutrition-Focused Physical Exam Findings RN skin assessment reviewed; No edema documented   Current PO Intake   Current Diet Order Regular diet   Current Meal Intake Adequate   PES Statement   Problem No nutrition diagnosis   Recommendations/Interventions   Summary Nutrition Risk - poor PO  Presents for evaluation of gluteal abscess  Past medical history significant for CKD, HTN, history of bladder cancer  Urostomy in place  Found to have BALDEMAR - Nephrology following  Incision and drainage of perianal and pilonidal abscess procedure 2/12  Pt reports having a normal appetite  He states he is enjoying the food here in the hospital  Per RN, pt is eating very well  Pt states he has 2 meals daily, lunch and dinner - he follows a regular diet  Weight is stable with not significant changes to note in the past year  Per pt report of adequate intake, anticipate nutrient needs are met at this time     Interventions/Recommendations Continue current diet order   Education Assessment   Education Education not indicated at this time

## 2023-02-14 NOTE — PROGRESS NOTES
Michael Pal is a 59 y o  male who is currently ordered Vancomycin IV with management by the Pharmacy Consult service  Relevant clinical data and objective / subjective history reviewed  Vancomycin Assessment:  Indication and Goal AUC/Trough: Soft tissue (goal -600, trough >10), -600, trough >10  Clinical Status: stable  Micro:   pending  Renal Function:  SCr: 2 62 mg/dL  CrCl: 30 4 mL/min  Renal replacement: Not on dialysis  Days of Therapy: 3  Current Dose: 1g daily prn when random level <15  Vancomycin Plan:  New Dosing: todays random level is 13 6 will give a one time dose of 1g  Next Level: 2/15 6am  Renal Function Monitoring: Daily BMP and [place] will continue to follow closely for s/sx of nephrotoxicity, infusion reactions and appropriateness of therapy  BMP and CBC will be ordered per protocol  We will continue to follow the patient’s culture results and clinical progress daily      Pancho Lira, Pharmacist

## 2023-02-14 NOTE — APP STUDENT NOTE
INTERNAL MEDICINE RESIDENCY PROGRESS NOTE     Name: Hector Sun   Age & Sex: 59 y o  male   MRN: [id number]  Unit/Bed#: -01   Encounter: [id number]  Team: {[id number]}    PATIENT INFORMATION     Name: Hector Sun   Age & Sex: 59 y o  male   MRN: [id number]  Hospital Stay Days: 2    ASSESSMENT/PLAN     Principal Problem:    Pilonidal disease  Active Problems:    Perianal abscess    BALDEMAR (acute kidney injury) ([facility] [address] )    Polyarthralgia    History of bladder cancer    Primary hypertension        Assessment/Plan    Pilonidal disease  - Pt presented to ER with worsening pilonidal pain, erythema, drainage for past 1-2 weeks  - I&D done on 2/12  - Continue vancomycin and cefepime   - waiting for culture results     Perianal abscess  - I&D done 2/12   - awaiting cultures   - Continue vancomycin and cefepime     BALDEMAR (acute kidney injury)   - Hx of CKD stage IV with baseline creatinine between 2 9-3   - likely due to dehydration and infection  - Creatinine is back to baseline  - nephrology following  - avoid hypotension and nephrotoxic medications  - Repeat BMP in the AM     Polyarthralgia  - Pt follows with [facility]ology at [facility]  - Currently on 1mg prednisone daily, on prednisone taper   - Stress dose steroids given yesterday - 50mg solu-cortef     Primary hypertension  - BP controlled currently   - metoprolol 25mg once daily started today     History of bladder cancer  - post cystectomy and ileal conduit      SUBJECTIVE     Patient seen and examined  No acute events overnight  Pt states he feels okay this morning  States pain is controlled and slightly worsening due to sleeping position  Denies any SOB, chest pain, abdominal pain, N/V/D  Pt is passing flatus and has not had a BM yet  Pt has appetite and is tolerating fluids       OBJECTIVE     Vitals:    02/13/23 2245 02/13/23 2246 02/14/23 0740 02/14/23 0741   BP: 131/77 131/77  115/75   Pulse: 80 70 62 68   Resp: 20 18  16   Temp: 98 6 °F (37 °C) 98 6 °F (37 °C) 97 6 °F (36 4 °C)    TempSrc:       SpO2: 97% 96%  93%   Weight:       Height:          Temperature:   Temp (24hrs), Av 2 °F (36 8 °C), Min:97 6 °F (36 4 °C), Max:98 6 °F (37 °C)    Temperature: 97 6 °F (36 4 °C)  Intake & Output:  I/O        0701   0700  0701   0700  0701  02/15 0700    P  O    360    I V  (mL/kg) 1500 (19 2) 2000 (25 6)     IV Piggyback 1050 200     Total Intake(mL/kg) 2550 (32 7) 2200 (28 2) 360 (4 6)    Urine (mL/kg/hr) 1300 4050 (2 2) 450 (1 5)    Blood 50      Total Output 1350 4050 450    Net +1200 -1850 -90               Weights:   IBW (Ideal Body Weight): 70 7 kg    Body mass index is 25 4 kg/m²  Weight (last 2 days)     Date/Time Weight    23 1121 78 (172)        Physical Exam  Vitals and nursing note reviewed  Constitutional:       General: He is not in acute distress  Appearance: Normal appearance  He is not ill-appearing, toxic-appearing or diaphoretic  HENT:      Head: Normocephalic and atraumatic  Mouth/Throat:      Mouth: Mucous membranes are moist    Eyes:      Extraocular Movements: Extraocular movements intact  Conjunctiva/sclera: Conjunctivae normal    Cardiovascular:      Rate and Rhythm: Normal rate and regular rhythm  Pulses: Normal pulses  Heart sounds: Normal heart sounds  No murmur heard  No friction rub  No gallop  Pulmonary:      Effort: Pulmonary effort is normal  No respiratory distress  Breath sounds: No wheezing, rhonchi or rales  Abdominal:      General: Abdomen is flat  Bowel sounds are normal  There is no distension  Palpations: Abdomen is soft  Tenderness: There is no abdominal tenderness  Neurological:      General: No focal deficit present  Mental Status: He is alert and oriented to person, place, and time  Psychiatric:         Mood and Affect: Mood normal          Behavior: Behavior normal        LABORATORY DATA     Labs:  I have personally reviewed pertinent reports  Results from last 7 days   Lab Units 02/14/23  0430 02/13/23  0451 02/12/23  1226   WBC Thousand/uL 10 91* 14 19* 19 63*   HEMOGLOBIN g/dL 12 5 13 4 16 2   HEMATOCRIT % 39 5 41 8 50 3*   PLATELETS Thousands/uL 327 335 369   NEUTROS PCT % 56 83* 83*   MONOS PCT % 10 7 8      Results from last 7 days   Lab Units 02/14/23  0430 02/13/23  0451 02/12/23  1226   POTASSIUM mmol/L 3 3* 3 5 3 5   CHLORIDE mmol/L 112* 109* 106   CO2 mmol/L 19* 16* 18*   BUN mg/dL 37* 42* 47*   CREATININE mg/dL 2 62* 2 94* 3 55*   CALCIUM mg/dL 8 4 8 5 9 4   ALK PHOS U/L  --  80 102   ALT U/L  --  8 10   AST U/L  --  8* 11*     Results from last 7 days   Lab Units 02/13/23  0451   MAGNESIUM mg/dL 2 3     Results from last 7 days   Lab Units 02/13/23  0451   PHOSPHORUS mg/dL 3 7      Results from last 7 days   Lab Units 02/12/23  1226   INR  1 08   PTT seconds 31     Results from last 7 days   Lab Units 02/12/23  1226   LACTIC ACID mmol/L 0 7           IMAGING & DIAGNOSTIC TESTING     Radiology Results:   CT pelvis w contrast    Result Date: 2/12/2023  Impression: 1  Large collection in the left posterior perineal and buttock region medially  This is in close proximity to the anorectal junction  This is most likely a perianal abscess  2   The left external iliac artery appears nearly occluded proximally but appears patent distally  The study was marked in [facility] for immediate notification   Workstation performed: [id number]       ACTIVE MEDICATIONS     Current Facility-Administered Medications   Medication Dose Route Frequency   • acetaminophen (TYLENOL) tablet 650 mg  650 mg Oral Q6H PRN   • cefepime (MAXIPIME) 500 mg in sodium chloride 0 9 % 50 mL IVPB  500 mg Intravenous Q24H   • heparin (porcine) subcutaneous injection 5,000 Units  5,000 Units Subcutaneous Q8H [address]   • HYDROmorphone (DILAUDID) injection 0 5 mg  0 5 mg Intravenous Q3H PRN   • metoprolol succinate (TOPROL-XL) 24 hr tablet 25 mg  25 mg Oral Daily   • multi-electrolyte (PLASMALYTE-A/ISOLYTE-S PH 7 4) IV solution  50 mL/hr Intravenous Continuous   • nicotine (NICODERM CQ) 14 mg/24hr TD 24 hr patch 1 patch  1 patch Transdermal Daily   • ondansetron (ZOFRAN) injection 4 mg  4 mg Intravenous Q6H PRN   • oxyCODONE (ROXICODONE) IR tablet 5 mg  5 mg Oral Q3H PRN   • polyethylene glycol (MIRALAX) packet 17 g  17 g Oral Daily   • potassium chloride (K-DUR,KLOR-CON) CR tablet 20 mEq  20 mEq Oral Once   • predniSONE tablet 1 mg  1 mg Oral Daily   • psyllium (METAMUCIL) 1 packet  1 packet Oral Daily   • sodium bicarbonate tablet 1,300 mg  1,300 mg Oral BID after meals   • vancomycin (VANCOCIN) IVPB (premix in dextrose) 1,000 mg 200 mL  12 5 mg/kg Intravenous Once PRN       VTE Pharmacologic Prophylaxis: Heparin  VTE Mechanical Prophylaxis: sequential compression device      ==  Nahomi WATTS   [employer]

## 2023-02-14 NOTE — ASSESSMENT & PLAN NOTE
· Diagnosed approximately 1 year ago  · Follows with rheumatology at [facility]  · The patient was started on prednisone taper course by 1 mg every month  Currently at 1 mg daily    · Noted to have soft blood pressure readings in the OR  · Patient has been receiving stress dose steroids  · Home prednisone dose resumed on 2/14/2023

## 2023-02-14 NOTE — CONSULTS
The patient’s Vancomycin therapy has been completed / discontinued  Thank you for this consult; Pharmacy will sign-off now      [place] [id number]

## 2023-02-14 NOTE — CASE MANAGEMENT
Case Management Assessment & Discharge Planning Note    Patient name Michael Pal  Location /-75 MRN [id number]  : 1965 Date 2023       Current Admission Date: 2023  Current Admission Diagnosis:Pilonidal disease   Patient Active Problem List    Diagnosis Date Noted   • BALDEMAR (acute kidney injury) ([facility] [address] ) 2023   • Polyarthralgia 2023   • History of bladder cancer 2023   • Primary hypertension 2023   • Perianal abscess    • Pilonidal disease       LOS (days): 2  Geometric Mean LOS (GMLOS) (days): 4 00  Days to GMLOS:2 3     OBJECTIVE:    Risk of Unplanned Readmission Score: 12 87         Current admission status: Inpatient  Referral Reason: Other (Discharge planning)    Preferred Pharmacy:   [id number]  [address] [place] [address]  Phone: [phone] Fax: [address] [place] [address] Mikel Campos [facility]  Phone: [phone] Fax: [phone]    Primary Care Provider: Leticia Sandoval DO    Primary Insurance: MEDICARE  Secondary Insurance:     ASSESSMENT:  Lenore Castellanos Proxies    There are no active Health Care Proxies on file  Advance Directives  Does patient have a [address]?: No  Was patient offered paperwork?: Yes  Does patient currently have a Health Care decision maker?: No  Does patient have Advance Directives?: No  Was patient offered paperwork?: Yes  Primary Contact: Veto Choudhary - spouse         Readmission Root Cause  30 Day Readmission: No    Patient Information  Admitted from[de-identified] Home  Mental Status: Alert  During Assessment patient was accompanied by: Not accompanied during assessment  Assessment information provided by[de-identified] Patient  Primary Caregiver: Self  Support Systems: Children, Family members  [place] of Residence: [address] [place] [address] Avenue do you live in?: [address] [facility] entry access options  Select all that apply  Lalita Huynh Stairs  Type of Current Residence: 2 story home  In the last 12 months, was there a time when you were not able to pay the mortgage or rent on time?: No  In the last 12 months, how many places have you lived?: 1  In the last 12 months, was there a time when you did not have a steady place to sleep or slept in a shelter (including now)?: No  Homeless/housing insecurity resource given?: N/A  Living Arrangements: Lives w/ Spouse/significant other  Is patient a ?: No    Activities of Daily Living Prior to Admission  Functional Status: Independent  Completes ADLs independently?: Yes  Ambulates independently?: Yes  Does patient use assisted devices?: No  Does patient currently own DME?: No  Does patient have a history of Outpatient Therapy (PT/OT)?: No  Does the patient have a history of Short-Term Rehab?: No  Does patient have a history of HHC?: No  Does patient currently have [employer]?: No         Patient Information Continued  Income Source: Unemployed  Does patient have prescription coverage?: Yes  Within the past 12 months, you worried that your food would run out before you got the money to buy more : Never true  Within the past 12 months, the food you bought just didn't last and you didn't have money to get more : Never true  Food insecurity resource given?: N/A  Does patient receive dialysis treatments?: No  Does patient have a history of substance abuse?: No  Does patient have a history of Mental Health Diagnosis?: No         Means of Transportation  Means of Transport to Appts[de-identified] Drives Self  In the past 12 months, has lack of transportation kept you from medical appointments or from getting medications?: No  In the past 12 months, has lack of transportation kept you from meetings, work, or from getting things needed for daily living?: No  Was application for public transport provided?: N/A        DISCHARGE DETAILS:    Discharge planning discussed with[de-identified] Patient  Freedom of Choice: Yes Requested [employer]         Is the patient interested in [facility] at discharge?: Yes  [address] requested[de-identified] [facility] Provider[de-identified] PCP  Home Health Services Needed[de-identified] Wound/Ostomy Care  Homebound Criteria Met[de-identified] Requires the Assistance of Another Person for Safe Ambulation or to Leave the Home  Supporting Clincal Findings[de-identified] Limited Endurance, Fatigues Easliy in [employer]    DME Referral Provided  Referral made for DME?: No    Other Referral/Resources/Interventions Provided:  Interventions: HHC  Referral Comments: C recommended for wound care  Pt aware and agreeable  Requested [facility], referral sent  Awaiting determination      Would you like to participate in our [address] service program?  : No - Declined    Treatment Team Recommendation: Home with [employer]  Discharge Destination Plan[de-identified] Home with [place] at Discharge : Family

## 2023-02-15 VITALS
BODY MASS INDEX: 25.48 KG/M2 | TEMPERATURE: 98.3 F | DIASTOLIC BLOOD PRESSURE: 76 MMHG | HEART RATE: 75 BPM | RESPIRATION RATE: 17 BRPM | WEIGHT: 172 LBS | OXYGEN SATURATION: 93 % | SYSTOLIC BLOOD PRESSURE: 119 MMHG | HEIGHT: 69 IN

## 2023-02-15 LAB
ANION GAP SERPL CALCULATED.3IONS-SCNC: 6 MMOL/L (ref 4–13)
BACTERIA SPEC ANAEROBE CULT: ABNORMAL
BACTERIA WND AEROBE CULT: ABNORMAL
BACTERIA WND AEROBE CULT: NORMAL
BASOPHILS # BLD AUTO: 0.11 THOUSANDS/ÂΜL (ref 0–0.1)
BASOPHILS NFR BLD AUTO: 1 % (ref 0–1)
BUN SERPL-MCNC: 35 MG/DL (ref 5–25)
CALCIUM SERPL-MCNC: 8.5 MG/DL (ref 8.4–10.2)
CHLORIDE SERPL-SCNC: 112 MMOL/L (ref 96–108)
CO2 SERPL-SCNC: 21 MMOL/L (ref 21–32)
CREAT SERPL-MCNC: 2.6 MG/DL (ref 0.6–1.3)
EOSINOPHIL # BLD AUTO: 0.64 THOUSAND/ÂΜL (ref 0–0.61)
EOSINOPHIL NFR BLD AUTO: 6 % (ref 0–6)
ERYTHROCYTE [DISTWIDTH] IN BLOOD BY AUTOMATED COUNT: 14.8 % (ref 11.6–15.1)
GFR SERPL CREATININE-BSD FRML MDRD: 25 ML/MIN/1.73SQ M
GLUCOSE SERPL-MCNC: 88 MG/DL (ref 65–140)
GRAM STN SPEC: ABNORMAL
GRAM STN SPEC: NORMAL
GRAM STN SPEC: NORMAL
HCT VFR BLD AUTO: 41.3 % (ref 36.5–49.3)
HGB BLD-MCNC: 13.5 G/DL (ref 12–17)
IMM GRANULOCYTES # BLD AUTO: 0.11 THOUSAND/UL (ref 0–0.2)
IMM GRANULOCYTES NFR BLD AUTO: 1 % (ref 0–2)
LYMPHOCYTES # BLD AUTO: 2.73 THOUSANDS/ÂΜL (ref 0.6–4.47)
LYMPHOCYTES NFR BLD AUTO: 26 % (ref 14–44)
MAGNESIUM SERPL-MCNC: 1.8 MG/DL (ref 1.9–2.7)
MCH RBC QN AUTO: 29.9 PG (ref 26.8–34.3)
MCHC RBC AUTO-ENTMCNC: 32.7 G/DL (ref 31.4–37.4)
MCV RBC AUTO: 91 FL (ref 82–98)
MONOCYTES # BLD AUTO: 1.21 THOUSAND/ÂΜL (ref 0.17–1.22)
MONOCYTES NFR BLD AUTO: 12 % (ref 4–12)
NEUTROPHILS # BLD AUTO: 5.66 THOUSANDS/ÂΜL (ref 1.85–7.62)
NEUTS SEG NFR BLD AUTO: 54 % (ref 43–75)
NRBC BLD AUTO-RTO: 0 /100 WBCS
PLATELET # BLD AUTO: 367 THOUSANDS/UL (ref 149–390)
PMV BLD AUTO: 8.7 FL (ref 8.9–12.7)
POTASSIUM SERPL-SCNC: 3.8 MMOL/L (ref 3.5–5.3)
RBC # BLD AUTO: 4.52 MILLION/UL (ref 3.88–5.62)
SODIUM SERPL-SCNC: 139 MMOL/L (ref 135–147)
VANCOMYCIN TROUGH SERPL-MCNC: 18.2 UG/ML (ref 10–20)
WBC # BLD AUTO: 10.46 THOUSAND/UL (ref 4.31–10.16)

## 2023-02-15 RX ORDER — DOCUSATE SODIUM 100 MG/1
100 CAPSULE, LIQUID FILLED ORAL 2 TIMES DAILY
Refills: 0 | COMMUNITY
Start: 2023-02-15

## 2023-02-15 RX ORDER — OXYCODONE HYDROCHLORIDE 5 MG/1
5 TABLET ORAL EVERY 6 HOURS PRN
Qty: 12 TABLET | Refills: 0 | Status: SHIPPED | OUTPATIENT
Start: 2023-02-15 | End: 2023-02-18

## 2023-02-15 RX ADMIN — HYDROMORPHONE HYDROCHLORIDE 0.2 MG: 0.2 INJECTION, SOLUTION INTRAMUSCULAR; INTRAVENOUS; SUBCUTANEOUS at 08:22

## 2023-02-15 RX ADMIN — FAMOTIDINE 20 MG: 20 TABLET, FILM COATED ORAL at 08:23

## 2023-02-15 RX ADMIN — METOPROLOL SUCCINATE 25 MG: 25 TABLET, EXTENDED RELEASE ORAL at 08:22

## 2023-02-15 RX ADMIN — PIPERACILLIN AND TAZOBACTAM 2.25 G: 2; .25 INJECTION, POWDER, FOR SOLUTION INTRAVENOUS at 08:24

## 2023-02-15 RX ADMIN — POLYETHYLENE GLYCOL 3350 17 G: 17 POWDER, FOR SOLUTION ORAL at 08:23

## 2023-02-15 RX ADMIN — Medication 1 PACKET: at 08:23

## 2023-02-15 RX ADMIN — HEPARIN SODIUM 5000 UNITS: 5000 INJECTION INTRAVENOUS; SUBCUTANEOUS at 05:15

## 2023-02-15 RX ADMIN — OXYCODONE HYDROCHLORIDE 10 MG: 10 TABLET ORAL at 05:21

## 2023-02-15 RX ADMIN — SODIUM BICARBONATE 1300 MG: 650 TABLET ORAL at 08:22

## 2023-02-15 RX ADMIN — PIPERACILLIN AND TAZOBACTAM 2.25 G: 2; .25 INJECTION, POWDER, FOR SOLUTION INTRAVENOUS at 03:17

## 2023-02-15 RX ADMIN — PREDNISONE 1 MG: 1 TABLET ORAL at 08:22

## 2023-02-15 NOTE — DISCHARGE SUMMARY
Discharge Summary - Tea Darling 59 y o  male MRN: [id number]    Unit/Bed#: -01 Encounter: [id number]    Admission Date:   Admission Orders (From admission, onward)     Ordered        02/12/23 [facility]  Once                    Discharge date: 2/15/23    Admitting Diagnosis: Perianal abscess [K61 0]  Abscess [L02 91]  BALDEMAR (acute kidney injury) ([facility] [address] ) [N17 9]  Pilonidal disease [L98 8]    HPI as per Dr Milady Acosta "Tea Darling is a 59 y o  male who presents with 1-2 weeks of worsening pilonidal and left perianal pain, swelling, erythema, purulent drainage  Some low grade temperatures at home, was trying warm soaks without relief  Prior spontaneously draining pilonidal abscess many years ago, intermittent flares, manages them at home, no prior procedures on the pilonidal or perianal region  Had bladder cancer, required chemotherapy, cystectomy with ileal conduit, additional chemo in [id number], follows for surveillance  Has significant acute on chronic kidney disease based on admission labs  WBC also 19, appears dehydrated  CT scan shows extensive left perianal abscess  Last PO intake last night "    Procedures Performed: No orders of the defined types were placed in this encounter  Summary of Hospital Course: patient presented to [address] [place] [address] on 2/12/23 for evaluation of gluteal abscess  He underwent workup in ED including imaging showing a large collection in left perineal and buttock region  WBC elevated at 19 6  BALDEMAR on CKD at time of presentation  Patient was admitted to general surgery service and was taken to the OR on 2/12/23 for an I&D of a perianal and pilonidal abscess  Post-operatively patient was closely monitored with daily labs, vital sign monitoring, and daily examinations/dressing changes  On day of discharge patient was hemodynamically stable, pain well controlled, BALDEMAR resolved and was eager to return home with familial support and [facility] for dressing changes      Family was educated on procedure for dressing changes and written instructions were provided in discharge materials  CM arranged for VNA services to be provided  A prescription for Oxycodone 5mg disp #12 tablets, #0 refills was sent to preferred pharmacy  Pt also instructed to continue bowel regimen/stool softener while recovering at help with bowel movements  Patient will follow up with Dr Julius Bach in 1-2 weeks  Significant Findings, Care, Treatment and Services Provided:   2/12/23 I&D perianal and pilonidal abscess (Dr Madrid Shown)     Complications: none    Discharge Diagnosis: perianal abscess s/p I&D    Condition at Discharge: good       Discharge instructions/Information to patient and family:   See after visit summary for information provided to patient and family  Provisions for Follow-Up Care:  See after visit summary for information related to follow-up care and any pertinent home health orders  PCP: Caryn Sheehan DO    Disposition: See After Visit Summary for discharge disposition information  Planned Readmission: No    Discharge Statement   I spent 30 minutes discharging the patient  This time was spent on the day of discharge  I had direct contact with the patient on the day of discharge  Additional documentation is required if more than 30 minutes were spent on discharge  Discharge Medications:  See after visit summary for reconciled discharge medications provided to patient and family  Samson Rodriguez  02/15/23  **Please Note: This note may have been constructed using a voice recognition system  **

## 2023-02-15 NOTE — PLAN OF CARE
Problem: INFECTION - ADULT  Goal: Absence or prevention of progression during hospitalization  Description: INTERVENTIONS:  - Assess and monitor for signs and symptoms of infection  - Monitor lab/diagnostic results  - Monitor all insertion sites, i e  indwelling lines, tubes, and drains  - Monitor endotracheal if appropriate and nasal secretions for changes in amount and color  - Rowan appropriate cooling/warming therapies per order  - Administer medications as ordered  - Instruct and encourage patient and family to use good hand hygiene technique  - Identify and instruct in appropriate isolation precautions for identified infection/condition  Outcome: Progressing

## 2023-02-15 NOTE — PROGRESS NOTES
Progress Note - General Surgery   Nicholeliantonio Stable 59 y o  male MRN: [id number]  Unit/Bed#: -01 Encounter: [id number]    Assessment:  58 y/o M w/ hx prior bladder cancer status post chemotherapy, cystectomy, ileal conduit, additional chemotherapy 2009, known CKD4, who presented with BALDEMAR on CKD and worsening pilonidal disease with development of perianal abscess  - afeb, VSS   - leukocytosis improving, WBC 10 4 from 10 9  - Hemoglobin stable   - Cr 2 6 (2 6)    Plan:  - patient stable from surgical perspective, will discharge to home with [facility] today   - dressing changed at bedside today  Instructions for dressing changes reviewed, written instructions in discharge paperwork   - PRN analgesia   - cont metamucil and colace on dc    Subjective/Objective   Subjective: no acute events overnight  Patient states he feels well today, eager to return home  Tolerating diet, pain well controlled  Voices understanding about dressing changes  Objective:   Blood pressure 119/76, pulse 75, temperature 98 3 °F (36 8 °C), temperature source Oral, resp  rate 17, height 5' 9" (1 753 m), weight 78 kg (172 lb), SpO2 93 %  ,Body mass index is 25 4 kg/m²  Intake/Output Summary (Last 24 hours) at 2/15/2023 0856  Last data filed at 2/15/2023 0797  Gross per 24 hour   Intake 1001 67 ml   Output 3550 ml   Net -2548 33 ml       Invasive Devices     Peripheral Intravenous Line  Duration           Peripheral IV 02/12/23 Left Antecubital 2 days    Peripheral IV 02/12/23 Right Antecubital 2 days          Drain  Duration           Urostomy RLQ -- days                Physical Exam  Vitals reviewed  Constitutional:       General: He is not in acute distress  Appearance: He is not ill-appearing  HENT:      Head: Normocephalic and atraumatic  Cardiovascular:      Rate and Rhythm: Normal rate and regular rhythm  Pulmonary:      Effort: Pulmonary effort is normal  No respiratory distress     Abdominal:      General: Bowel sounds are normal       Palpations: Abdomen is soft  Tenderness: There is no abdominal tenderness  Skin:     General: Skin is warm and dry  Comments: Packing removed from surgical site on right medal buttock, no necrosis, not malodorous, mildly tender, no surrounding erythema, no copious drainage    Neurological:      General: No focal deficit present  Mental Status: He is alert and oriented to person, place, and time  Lab, Imaging and other studies:  I have personally reviewed pertinent lab results  , CBC:   Lab Results   Component Value Date    WBC 10 46 (H) 02/15/2023    HGB 13 5 02/15/2023    HCT 41 3 02/15/2023    MCV 91 02/15/2023     02/15/2023    MCH 29 9 02/15/2023    MCHC 32 7 02/15/2023    RDW 14 8 02/15/2023    MPV 8 7 (L) 02/15/2023    NRBC 0 02/15/2023   , CMP:   Lab Results   Component Value Date    SODIUM 139 02/15/2023    K 3 8 02/15/2023     (H) 02/15/2023    CO2 21 02/15/2023    BUN 35 (H) 02/15/2023    CREATININE 2 60 (H) 02/15/2023    CALCIUM 8 5 02/15/2023    EGFR 25 02/15/2023     VTE Pharmacologic Prophylaxis: Heparin  VTE Mechanical Prophylaxis: sequential compression device    Venora Ranjithus  02/15/23  **Please Note: This note may have been constructed using a voice recognition system  **

## 2023-02-15 NOTE — DISCHARGE INSTR - OTHER ORDERS
Remove packing from wound bed  Cleanse area with soap and water   Repack with betadine soaked kerlix, cover with ABD pad and mesh underwear    Dressing changes to be completed at least twice daily  You may fine it easier to time dressing changes around showers  Dressing should be changed after every bowel movement

## 2023-02-15 NOTE — NURSING NOTE
Discharge instructions and prescriptions reviewed with pt  His questions were answered and he stated understanding

## 2023-02-15 NOTE — PLAN OF CARE
Problem: INFECTION - ADULT  Goal: Absence or prevention of progression during hospitalization  Description: INTERVENTIONS:  - Assess and monitor for signs and symptoms of infection  - Monitor lab/diagnostic results  - Monitor all insertion sites, i e  indwelling lines, tubes, and drains  - Monitor endotracheal if appropriate and nasal secretions for changes in amount and color  - [place] appropriate cooling/warming therapies per order  - Administer medications as ordered  - Instruct and encourage patient and family to use good hand hygiene technique  - Identify and instruct in appropriate isolation precautions for identified infection/condition  Outcome: Progressing  Goal: Absence of fever/infection during neutropenic period  Description: INTERVENTIONS:  - Monitor WBC    Outcome: Progressing     Problem: SAFETY ADULT  Goal: Patient will remain free of falls  Description: INTERVENTIONS:  - Educate patient/family on patient safety including physical limitations  - Instruct patient to call for assistance with activity   - Consult OT/PT to assist with strengthening/mobility   - Keep Call bell within reach  - Keep bed low and locked with side rails adjusted as appropriate  - Keep care items and personal belongings within reach  - Initiate and maintain comfort rounds  - Make Fall Risk Sign visible to staff  - Offer Toileting every 2 Hours, in advance of need  - Initiate/Maintain bed/chair alarm  - Obtain necessary fall risk management equipment: bed in lowest and locked position, call bell within reach  - Apply yellow socks and bracelet for high fall risk patients  - Consider moving patient to room near nurses station  Outcome: Progressing  Goal: Maintain or return to baseline ADL function  Description: INTERVENTIONS:  -  Assess patient's ability to carry out ADLs; assess patient's baseline for ADL function and identify physical deficits which impact ability to perform ADLs (bathing, care of mouth/teeth, toileting, grooming, dressing, etc )  - Assess/evaluate cause of self-care deficits   - Assess range of motion  - Assess patient's mobility; develop plan if impaired  - Assess patient's need for assistive devices and provide as appropriate  - Encourage maximum independence but intervene and supervise when necessary  - Involve family in performance of ADLs  - Assess for home care needs following discharge   - Consider OT consult to assist with ADL evaluation and planning for discharge  - Provide patient education as appropriate  Outcome: Progressing  Goal: Maintains/Returns to pre admission functional level  Description: INTERVENTIONS:  - Perform BMAT or MOVE assessment daily    - Set and communicate daily mobility goal to care team and patient/family/caregiver  - Collaborate with rehabilitation services on mobility goals if consulted  - Perform Range of Motion 3 times a day  - Reposition patient every 2 hours    - Dangle patient 3 times a day  - Stand patient 3 times a day  - Ambulate patient 3 times a day  - Out of bed to chair 3 times a day   - Out of bed for meals 3 times a day  - Out of bed for toileting  - Record patient progress and toleration of activity level   Outcome: Progressing     Problem: PAIN - ADULT  Goal: Verbalizes/displays adequate comfort level or baseline comfort level  Description: Interventions:  - Encourage patient to monitor pain and request assistance  - Assess pain using appropriate pain scale  - Administer analgesics based on type and severity of pain and evaluate response  - Implement non-pharmacological measures as appropriate and evaluate response  - Consider cultural and social influences on pain and pain management  - Notify physician/advanced practitioner if interventions unsuccessful or patient reports new pain  Outcome: Progressing

## 2023-02-15 NOTE — CASE MANAGEMENT
Case Management Discharge Planning Note    Patient name Michael Pal  Location /-70 MRN [id number]  : 1965 Date 2/15/2023       Current Admission Date: 2023  Current Admission Diagnosis:Pilonidal disease   Patient Active Problem List    Diagnosis Date Noted   • BALDEMAR (acute kidney injury) ([facility] [address] ) 2023   • Polyarthralgia 2023   • History of bladder cancer 2023   • Primary hypertension 2023   • Perianal abscess    • Pilonidal disease       LOS (days): 3  Geometric Mean LOS (GMLOS) (days): 4 00  Days to GMLOS:1 3     OBJECTIVE:  Risk of Unplanned Readmission Score: 13 06         Current admission status: Inpatient   Preferred Pharmacy:   [facility]/pharmacy #[id number]- [address] [address]  Phone: [phone] Fax: [address] [place] [address] Mikel Campos [facility]  Phone: [phone] Fax: [phone]    Primary Care Provider: Leticia Sandoval DO    Primary Insurance: MEDICARE  Secondary Insurance:     DISCHARGE DETAILS:    Discharge planning discussed with[de-identified] Patient  Freedom of Choice: Yes  Comments - Freedom of Choice: Pt stable for discharge today per surgery  Pt will return home with family support and [facility] for wound care  Family to transport  No other CM discharge needs identified at this time

## 2023-02-16 ENCOUNTER — HOME CARE VISIT (OUTPATIENT)
Dept: HOME HEALTH SERVICES | Facility: HOME HEALTHCARE | Age: 58
End: 2023-02-16

## 2023-02-16 ENCOUNTER — TRANSITIONAL CARE MANAGEMENT (OUTPATIENT)
Dept: FAMILY MEDICINE CLINIC | Facility: CLINIC | Age: 58
End: 2023-02-16

## 2023-02-16 VITALS
DIASTOLIC BLOOD PRESSURE: 70 MMHG | BODY MASS INDEX: 23.19 KG/M2 | HEART RATE: 73 BPM | SYSTOLIC BLOOD PRESSURE: 132 MMHG | OXYGEN SATURATION: 97 % | HEIGHT: 70 IN | RESPIRATION RATE: 18 BRPM | WEIGHT: 162 LBS | TEMPERATURE: 98.8 F

## 2023-02-17 ENCOUNTER — RA CDI HCC (OUTPATIENT)
Dept: OTHER | Facility: HOSPITAL | Age: 58
End: 2023-02-17

## 2023-02-17 LAB
BACTERIA BLD CULT: NORMAL
BACTERIA BLD CULT: NORMAL

## 2023-02-17 NOTE — PROGRESS NOTES
[facility] [address]  coding opportunities       Chart reviewed, no opportunity found: CHART REVIEWED, NO OPPORTUNITY FOUND        Patients Insurance     Medicare Insurance: Medicare

## 2023-02-20 ENCOUNTER — HOME CARE VISIT (OUTPATIENT)
Dept: HOME HEALTH SERVICES | Facility: HOME HEALTHCARE | Age: 58
End: 2023-02-20

## 2023-02-23 ENCOUNTER — HOME CARE VISIT (OUTPATIENT)
Dept: HOME HEALTH SERVICES | Facility: HOME HEALTHCARE | Age: 58
End: 2023-02-23

## 2023-02-23 ENCOUNTER — TELEPHONE (OUTPATIENT)
Dept: SURGERY | Facility: CLINIC | Age: 58
End: 2023-02-23

## 2023-02-23 VITALS
DIASTOLIC BLOOD PRESSURE: 54 MMHG | OXYGEN SATURATION: 98 % | HEART RATE: 72 BPM | TEMPERATURE: 99.1 F | RESPIRATION RATE: 16 BRPM | SYSTOLIC BLOOD PRESSURE: 118 MMHG

## 2023-02-23 NOTE — TELEPHONE ENCOUNTER
Left message for the patient to call our office in regards to an appointment for increasing redness at his perianal wound

## 2023-02-23 NOTE — TELEPHONE ENCOUNTER
Daisha Bethea called from home health care in regards to the patient  having increasing redness at the perianal wound site  Patient is a s/p I & D from 02/12/2023  Tiger Text sent to Dr Rik Rosario

## 2023-02-24 ENCOUNTER — OFFICE VISIT (OUTPATIENT)
Dept: FAMILY MEDICINE CLINIC | Facility: CLINIC | Age: 58
End: 2023-02-24

## 2023-02-24 ENCOUNTER — OFFICE VISIT (OUTPATIENT)
Dept: SURGERY | Facility: CLINIC | Age: 58
End: 2023-02-24

## 2023-02-24 VITALS
BODY MASS INDEX: 24.88 KG/M2 | DIASTOLIC BLOOD PRESSURE: 66 MMHG | SYSTOLIC BLOOD PRESSURE: 116 MMHG | RESPIRATION RATE: 20 BRPM | WEIGHT: 168 LBS | HEIGHT: 69 IN | HEART RATE: 84 BPM | TEMPERATURE: 97.3 F

## 2023-02-24 VITALS
RESPIRATION RATE: 18 BRPM | OXYGEN SATURATION: 99 % | DIASTOLIC BLOOD PRESSURE: 78 MMHG | WEIGHT: 172 LBS | HEART RATE: 76 BPM | BODY MASS INDEX: 24.68 KG/M2 | TEMPERATURE: 97.7 F | SYSTOLIC BLOOD PRESSURE: 123 MMHG

## 2023-02-24 DIAGNOSIS — Z90.6 ACQUIRED ABSENCE OF OTHER PARTS OF URINARY TRACT: ICD-10-CM

## 2023-02-24 DIAGNOSIS — L98.8 PILONIDAL DISEASE: ICD-10-CM

## 2023-02-24 DIAGNOSIS — K61.0 PERIANAL ABSCESS: Primary | ICD-10-CM

## 2023-02-24 DIAGNOSIS — M25.50 POLYARTHRALGIA: ICD-10-CM

## 2023-02-24 DIAGNOSIS — N18.4 CHRONIC KIDNEY DISEASE, STAGE 4 (SEVERE) (HCC): ICD-10-CM

## 2023-02-24 DIAGNOSIS — C67.9 MALIGNANT NEOPLASM OF URINARY BLADDER, UNSPECIFIED SITE (HCC): ICD-10-CM

## 2023-02-24 DIAGNOSIS — M35.3 PMR (POLYMYALGIA RHEUMATICA) (HCC): ICD-10-CM

## 2023-02-24 DIAGNOSIS — I15.9 SECONDARY HYPERTENSION: ICD-10-CM

## 2023-02-24 NOTE — PROGRESS NOTES
MIKHAIL Gibson 59 y o  male   Date:  2/24/2023    TCM Call     Date and time call was made  2/16/2023 11:03 AM    Hospital care reviewed  Records reviewed    Patient was hospitialized at  [address]    Date of Admission  02/12/23    Date of discharge  02/15/23    Diagnosis  Pilonidal Disease    Disposition  Home; Home health services    Were the patients medications reviewed and updated  Yes  start: Colace 100, Roxicodone 5mg, metamucil    Current Symptoms  --  generalized pain      TCM Call     Post hospital issues  Reduced activity    Should patient be enrolled in anticoag monitoring? No    Scheduled for follow up? Yes  02/24/2023 @ 2:30    Referrals needed  Khurram Nam, Surgical Oncology @ 03/01/2023    Did you obtain your prescribed medications  Yes    Do you need help managing your prescriptions or medications  No    Is transportation to your appointment needed  No    I have advised the patient to call PCP with any new or worsening symptoms  [facility] records were reviewed  Medications upon discharge reviewed/updated  Discharge Disposition:    Follow up visits with other specialists:       Assessment and Plan: Patient presented to the emergency room for 1 to 2 weeks of worsening of Cholinoid L on left perianal pain swelling erythema purulent drainage  Some low-grade temperatures at home but is trying warm soaks without relief patient had a prior spontaneously draining pilonidal abscess many years ago  He has had intermittent flares  Manages them at home  The patient had bladder cancer required chemotherapy cystectomy with ileal conduit additional chemo and 2090 10  Patient follows for surveillance  CT scan showed extensive left perianal abscess  The patient was taken to the operating room February 12 for an I&D of the perianal and pilonidal abscess    Postoperatively the patient was closely monitored with daily labs vital sign monitoring and daily examinations with dressing changes  On the day of discharge the patient was hemodynamically stable felt well controlled acute kidney injury resolved  The patient will be following with surgery    The patient's oncologist has retired and will need a referral for ongoing survey of his history of bladder cancer        Hypertensive cardiovascular disease with a blood pressure controlled on current regimen of Toprol-XL 25    Chronic kidney disease stage IV    There are no diagnoses linked to this encounter  HPI:  Patient presents for transition of care management was hospitalized at the [facility] from February 12 through February 15 with a diagnosis of pilonidal disease      ROS: Review of Systems   Constitutional: Negative for chills and fever  HENT: Negative for ear pain and sore throat  Eyes: Negative for pain and visual disturbance  Respiratory: Negative for cough and shortness of breath  Cardiovascular: Negative for chest pain and palpitations  Gastrointestinal: Negative for abdominal pain and vomiting  Genitourinary: Negative for dysuria and hematuria  Musculoskeletal: Negative for arthralgias and back pain  Skin: Negative for color change and rash  Neurological: Negative for seizures and syncope  All other systems reviewed and are negative        Past Medical History:   Diagnosis Date   • 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    • Calculus of kidney    • Hyperlipidemia    • Hypertension    • Malignant neoplasm of ureteric orifice ([facility] [address] )    • Other male erectile dysfunction        Past Surgical History:   Procedure Laterality Date   • CYSTECTOMY W/ URETEROILEAL CONDUIT  2009   • INCISION AND DRAINAGE OF WOUND N/A 2/12/2023    Procedure: INCISION AND DRAINAGE (I&D) BUTTOCK, PERIANAL AND PILONIDAL ABSCESS;  Surgeon: Zaki Granda MD;  Location: [facility];  Service: General       Social History     Socioeconomic History   • Marital status: /Civil Union     Spouse name: None   • Number of children: None   • Years of education: None   • Highest education level: None   Occupational History   • None   Tobacco Use   • Smoking status: Every Day     Packs/day: 0 50     Types: Cigarettes   • Smokeless tobacco: Never   • Tobacco comments:     01/31/2022-- max 0 5pk a day   Vaping Use   • Vaping Use: Never used   Substance and Sexual Activity   • Alcohol use: Not Currently   • Drug use: No   • Sexual activity: None   Other Topics Concern   • None   Social History Narrative   • None     Social Determinants of Health     Financial Resource Strain: Not on file   Food Insecurity: No Food Insecurity   • Worried About Running Out of Food in the Last Year: Never true   • Ran Out of Food in the Last Year: Never true   Transportation Needs: No Transportation Needs   • Lack of Transportation (Medical): No   • Lack of Transportation (Non-Medical):  No   Physical Activity: Not on file   Stress: Not on file   Social Connections: Not on file   Intimate Partner Violence: Not on file   Housing Stability: Low Risk    • Unable to Pay for Housing in the Last Year: No   • Number of Places Lived in the Last Year: 1   • Unstable Housing in the Last Year: No       Family History   Problem Relation Age of Onset   • Aneurysm Mother    • Heart failure Father        No Known Allergies      Current Outpatient Medications:   •  dronabinol (MARINOL) 5 MG capsule, TAKE 1 CAPSULE BY MOUTH EVERY DAY (Patient taking differently: as needed), Disp: 90 capsule, Rfl: 0  •  alprostadil (EDEX) 20 MCG injection, 20 mcg by Intracavitary route as needed for erectile dysfunction (Patient not taking: Reported on 2/16/2023), Disp: 5 vial, Rfl: 6  •  amLODIPine (NORVASC) 10 mg tablet, TAKE 1 TABLET BY MOUTH EVERY DAY, Disp: 90 tablet, Rfl: 1  •  docusate sodium (COLACE) 100 mg capsule, Take 1 capsule (100 mg total) by mouth 2 (two) times a day, Disp: , Rfl: 0  •  famotidine (PEPCID) 20 mg tablet, TAKE 1 TABLET BY MOUTH TWICE A DAY (Patient not taking: No sig reported), Disp: 60 tablet, Rfl: 2  •  metoprolol succinate (TOPROL-XL) 25 mg 24 hr tablet, TAKE 1 TABLET (25 MG TOTAL) BY MOUTH DAILY  , Disp: 90 tablet, Rfl: 1  •  Multiple Vitamin (DAILY VALUE MULTIVITAMIN) TABS, Take 1 tablet by mouth daily, Disp: , Rfl:   •  Potassium Citrate ER 15 MEQ (1620 MG) TBCR, Take 1 tablet by mouth in the morning NOT TAKING, Disp: , Rfl:   •  predniSONE 10 mg tablet, TAKE 1 TABLET BY MOUTH EVERY DAY (Patient taking differently: Take 1 mg by mouth daily), Disp: 30 tablet, Rfl: 0  •  PROSTAGLANDIN PGE1 INJECTABLE 20 MCG/ML, STANDARD, IJ SOLN, Inject 0 3mL to 1 0mL IC 20 minutes prior to intercourse  (Patient not taking: Reported on 9/25/2020), Disp: 10 mL, Rfl: 0  •  psyllium (METAMUCIL) packet, Take 1 packet by mouth daily Do not start before February 16, 2023 , Disp: , Rfl: 0  •  sodium bicarbonate 650 mg tablet, Take by mouth, Disp: , Rfl:       Physical Exam:  /66   Pulse 84   Temp (!) 97 3 °F (36 3 °C)   Resp 20   Ht 5' 9" (1 753 m)   Wt 76 2 kg (168 lb)   BMI 24 81 kg/m²     Physical Exam  Constitutional:       Appearance: Normal appearance  He is well-developed  HENT:      Head: Normocephalic and atraumatic  Right Ear: Tympanic membrane, ear canal and external ear normal       Left Ear: Tympanic membrane, ear canal and external ear normal       Nose: Nose normal       Mouth/Throat:      Mouth: Mucous membranes are moist       Pharynx: Oropharynx is clear  Eyes:      Extraocular Movements: Extraocular movements intact  Conjunctiva/sclera: Conjunctivae normal       Pupils: Pupils are equal, round, and reactive to light  Cardiovascular:      Rate and Rhythm: Normal rate and regular rhythm  Pulses: Normal pulses  Heart sounds: Normal heart sounds  Pulmonary:      Effort: Pulmonary effort is normal       Breath sounds: Normal breath sounds  Abdominal:      General: Abdomen is flat   Bowel sounds are normal       Palpations: Abdomen is soft       Tenderness: There is no abdominal tenderness  Musculoskeletal:         General: Normal range of motion  Cervical back: Normal range of motion and neck supple  Skin:     General: Skin is warm and dry  Capillary Refill: Capillary refill takes less than 2 seconds  Neurological:      General: No focal deficit present  Mental Status: He is alert and oriented to person, place, and time  Psychiatric:         Mood and Affect: Mood normal          Behavior: Behavior normal          Thought Content:  Thought content normal          Judgment: Judgment normal              Labs:  Lab Results   Component Value Date    WBC 10 46 (H) 02/15/2023    HGB 13 5 02/15/2023    HCT 41 3 02/15/2023    MCV 91 02/15/2023     02/15/2023     Lab Results   Component Value Date     11/25/2015    K 3 8 02/15/2023     (H) 02/15/2023    CO2 21 02/15/2023    ANIONGAP 14 (H) 11/25/2015    BUN 35 (H) 02/15/2023    CREATININE 2 60 (H) 02/15/2023    GLUCOSE 98 11/25/2015    GLUF 108 (H) 01/29/2020    CALCIUM 8 5 02/15/2023    CORRECTEDCA 9 2 02/13/2023    AST 8 (L) 02/13/2023    ALT 8 02/13/2023    ALKPHOS 80 02/13/2023    PROT 7 6 11/25/2015    BILITOT 0 59 11/25/2015    EGFR 25 02/15/2023

## 2023-02-27 ENCOUNTER — HOME CARE VISIT (OUTPATIENT)
Dept: HOME HEALTH SERVICES | Facility: HOME HEALTHCARE | Age: 58
End: 2023-02-27

## 2023-02-27 ENCOUNTER — TELEPHONE (OUTPATIENT)
Dept: ADMINISTRATIVE | Facility: OTHER | Age: 58
End: 2023-02-27

## 2023-02-27 ENCOUNTER — TELEPHONE (OUTPATIENT)
Dept: HEMATOLOGY ONCOLOGY | Facility: CLINIC | Age: 58
End: 2023-02-27

## 2023-02-27 VITALS
OXYGEN SATURATION: 98 % | TEMPERATURE: 99.2 F | SYSTOLIC BLOOD PRESSURE: 106 MMHG | HEART RATE: 74 BPM | RESPIRATION RATE: 20 BRPM | DIASTOLIC BLOOD PRESSURE: 62 MMHG

## 2023-02-27 NOTE — PROGRESS NOTES
Post-Op Note - General Surgery   Kraig James 59 y o  male MRN: [id number]  Encounter: [id number]    Assessment/Plan    Perianal abscess  Presenting for redness around wound  Otherwise well, pain improving  On exam wound is clean and open without purulence  Some biofilm or slough noted at upper half  No tunneling  Magdalene-wound skin with hyperpigmentation  Suspect this is dermatitis related to wound care  No signs of cellulitis  Advised topical zinc oxide for this  Wound treated with betadine swab  Follow-up scheduled with Dr Oliverio Lujan for recheck  Diagnoses and all orders for this visit:    Perianal abscess        Subjective      Chief Complaint   Patient presents with   • Follow-up     increasing redness at the perianal wound site, s/p I & D from 02/12/2023 by Dr Roger Mai s/p I&D large perianal/pilonidal abscess  Reports feeling well  Less pain  Visiting nurse concerned about redness around wound  Review of Systems   Skin: Positive for wound  The following portions of the patient's history were reviewed and updated as appropriate: allergies, current medications, past family history, past medical history, past social history, past surgical history and problem list     Objective      Blood pressure 123/78, pulse 76, temperature 97 7 °F (36 5 °C), temperature source Temporal, resp  rate 18, weight 78 kg (172 lb), SpO2 99 %  Physical Exam  Vitals and nursing note reviewed  Constitutional:       General: He is not in acute distress  Appearance: He is well-developed  He is not diaphoretic  HENT:      Head: Normocephalic and atraumatic  Eyes:      Conjunctiva/sclera: Conjunctivae normal       Pupils: Pupils are equal, round, and reactive to light  Pulmonary:      Effort: No respiratory distress  Musculoskeletal:         General: Normal range of motion  Cervical back: Normal range of motion  Skin:     General: Skin is warm and dry        Capillary Refill: Capillary refill takes less than 2 seconds  Comments: Wound is open and clean without purulence or tunneling  Lower half with red granulation tissue  Upper half with slough vs biofilm  Skin around with mild dermatitis, no signs of cellulitis  Neurological:      Mental Status: He is alert and oriented to person, place, and time     Psychiatric:         Behavior: Behavior normal          Signature:  Dick Burden PA-C  Date: 2/27/2023 Time: 11:41 AM

## 2023-02-27 NOTE — TELEPHONE ENCOUNTER
Patient has a referral on file - Made an attempt to schedule a new patient consultation  Patient's voicemail is not set up

## 2023-02-27 NOTE — ASSESSMENT & PLAN NOTE
Presenting for redness around wound  Otherwise well, pain improving  On exam wound is clean and open without purulence  Some biofilm or slough noted at upper half  No tunneling  Magdalene-wound skin with hyperpigmentation  Suspect this is dermatitis related to wound care  No signs of cellulitis  Advised topical zinc oxide for this  Wound treated with betadine swab  Follow-up scheduled with Dr Sylvia Baltazar for recheck

## 2023-02-27 NOTE — TELEPHONE ENCOUNTER
Upon review of the In Basket request we were able to locate, review, and update the patient chart as requested for Hepatitis C   Any additional questions or concerns should be emailed to the Practice Liaisons via the appropriate education email address, please do not reply via In Basket      Thank you  Lupillo Bolaños MA

## 2023-02-27 NOTE — TELEPHONE ENCOUNTER
----- Message from Rik Munoz sent at 2/24/2023  2:40 PM EST -----  Regarding: Care Gaps Request  02/24/23 2:40 PM    Hello, our patient Dianne Palomares has had Hepatitis C completed/performed  Please assist in updating the patient chart by pulling the Care Everywhere (CE) document  The date of service is 05/31/2022       Thank you,  Rik Munoz  [facility]

## 2023-02-28 ENCOUNTER — TELEPHONE (OUTPATIENT)
Dept: HEMATOLOGY ONCOLOGY | Facility: CLINIC | Age: 58
End: 2023-02-28

## 2023-02-28 NOTE — TELEPHONE ENCOUNTER
Made attempt to schedule a new patient appointment with Hematology oncology, left a message with the hopeline number with the patients spouse Oriana Weaver

## 2023-03-01 ENCOUNTER — OFFICE VISIT (OUTPATIENT)
Dept: SURGERY | Facility: CLINIC | Age: 58
End: 2023-03-01

## 2023-03-01 ENCOUNTER — TELEPHONE (OUTPATIENT)
Dept: HEMATOLOGY ONCOLOGY | Facility: CLINIC | Age: 58
End: 2023-03-01

## 2023-03-01 VITALS
TEMPERATURE: 98.5 F | BODY MASS INDEX: 25.33 KG/M2 | OXYGEN SATURATION: 99 % | HEART RATE: 83 BPM | HEIGHT: 69 IN | RESPIRATION RATE: 20 BRPM | SYSTOLIC BLOOD PRESSURE: 126 MMHG | WEIGHT: 171 LBS | DIASTOLIC BLOOD PRESSURE: 72 MMHG

## 2023-03-01 DIAGNOSIS — Z51.89 ENCOUNTER FOR WOUND CARE: ICD-10-CM

## 2023-03-01 DIAGNOSIS — L98.8 PILONIDAL DISEASE: Primary | ICD-10-CM

## 2023-03-01 DIAGNOSIS — K61.0 PERIANAL ABSCESS: ICD-10-CM

## 2023-03-01 NOTE — TELEPHONE ENCOUNTER
Made attempt to schedule new patient appointment, when I started asking question from the questionaire the patient states he will give us a call back because he has an appointment to attend

## 2023-03-02 ENCOUNTER — HOME CARE VISIT (OUTPATIENT)
Dept: HOME HEALTH SERVICES | Facility: HOME HEALTHCARE | Age: 58
End: 2023-03-02

## 2023-03-02 PROBLEM — Z51.89 ENCOUNTER FOR WOUND CARE: Status: ACTIVE | Noted: 2023-03-02

## 2023-03-02 NOTE — PROGRESS NOTES
Post-Op Note - General Surgery   Sami Garza 59 y o  male MRN: [id number]  Encounter: [id number]    Assessment/Plan     57M approximately 3 weeks status post incision and drainage of complex left perianal abscess associated with significant pilonidal disease  Recovering very well  Completed antibiotics  Ongoing daily wound care with the help of his wife and V[facility]  Wound site is healing in well, some mild slough on superior portion, otherwise granulation tissue throughout  Site cleansed and some moist gauze used to debride some of the slough  No active infection  Site repacked and dressed  Additional supplies given  Discussed that this procedure managed the acute abscess and that it was not definitive excision of his pilonidal disease which is still present, he understands this  I will follow him closely during his wound healing period, return visit in 3 weeks or sooner if any concerns  Overall doing very well  Subjective      Chief Complaint   Patient presents with   • Post-op     Doing well, no fevers, no pain, wife helping with wound care, [facility] visits ongoing, was seen by our surgical PA with concerns for some new redness surrounding the incision, that has since improved, was not thought to be a new infection, potentially dermatitis or pressure related, now not apparent  Review of Systems   Constitutional: Positive for activity change  Negative for chills, diaphoresis, fatigue and fever  Skin: Positive for wound  All other systems reviewed and are negative  The following portions of the patient's history were reviewed and updated as appropriate: allergies, current medications, past family history, past medical history, past social history, past surgical history and problem list     Objective      Blood pressure 126/72, pulse 83, temperature 98 5 °F (36 9 °C), resp  rate 20, height 5' 9" (1 753 m), weight 77 6 kg (171 lb), SpO2 99 %  Physical Exam  Vitals reviewed     Constitutional:       General: He is not in acute distress  Appearance: He is not toxic-appearing  HENT:      Head: Normocephalic  Mouth/Throat:      Mouth: Mucous membranes are moist    Eyes:      Pupils: Pupils are equal, round, and reactive to light  Cardiovascular:      Rate and Rhythm: Normal rate  Pulmonary:      Effort: Pulmonary effort is normal  No respiratory distress  Musculoskeletal:         General: Normal range of motion  Cervical back: Normal range of motion  Skin:     General: Skin is warm and dry  Capillary Refill: Capillary refill takes less than 2 seconds  Comments: Left perianal and gluteal wound healing in by secondary intention after aggressive incision and drainage, no active infection remains, pilonidal disease remains, wound bed with granulation tissue throughout, some overlying slough along base of wound at superior portion, attempt to cleanse and debride with qtip and moist gauze with some success, some adherent slough remains   Neurological:      General: No focal deficit present  Mental Status: He is alert  Psychiatric:         Mood and Affect: Mood normal          Signature:   Peyman Barba MD  Date: 3/2/2023 Time: 4:19 PM

## 2023-03-06 ENCOUNTER — HOME CARE VISIT (OUTPATIENT)
Dept: HOME HEALTH SERVICES | Facility: HOME HEALTHCARE | Age: 58
End: 2023-03-06

## 2023-03-06 VITALS
OXYGEN SATURATION: 98 % | RESPIRATION RATE: 18 BRPM | TEMPERATURE: 98.7 F | HEART RATE: 72 BPM | DIASTOLIC BLOOD PRESSURE: 62 MMHG | SYSTOLIC BLOOD PRESSURE: 128 MMHG

## 2023-03-16 ENCOUNTER — HOME CARE VISIT (OUTPATIENT)
Dept: HOME HEALTH SERVICES | Facility: HOME HEALTHCARE | Age: 58
End: 2023-03-16

## 2023-03-16 VITALS
TEMPERATURE: 97.8 F | HEART RATE: 68 BPM | DIASTOLIC BLOOD PRESSURE: 62 MMHG | OXYGEN SATURATION: 98 % | SYSTOLIC BLOOD PRESSURE: 116 MMHG | RESPIRATION RATE: 20 BRPM

## 2023-03-17 DIAGNOSIS — I15.9 SECONDARY HYPERTENSION: ICD-10-CM

## 2023-03-17 RX ORDER — METOPROLOL SUCCINATE 25 MG/1
25 TABLET, EXTENDED RELEASE ORAL DAILY
Qty: 90 TABLET | Refills: 1 | Status: SHIPPED | OUTPATIENT
Start: 2023-03-17

## 2023-03-17 RX ORDER — AMLODIPINE BESYLATE 10 MG/1
TABLET ORAL
Qty: 90 TABLET | Refills: 1 | Status: SHIPPED | OUTPATIENT
Start: 2023-03-17

## 2023-03-22 ENCOUNTER — HOME CARE VISIT (OUTPATIENT)
Dept: HOME HEALTH SERVICES | Facility: HOME HEALTHCARE | Age: 58
End: 2023-03-22

## 2023-03-22 VITALS
HEART RATE: 78 BPM | DIASTOLIC BLOOD PRESSURE: 72 MMHG | TEMPERATURE: 97.6 F | SYSTOLIC BLOOD PRESSURE: 116 MMHG | OXYGEN SATURATION: 100 % | RESPIRATION RATE: 18 BRPM

## 2023-03-27 ENCOUNTER — OFFICE VISIT (OUTPATIENT)
Dept: SURGERY | Facility: CLINIC | Age: 58
End: 2023-03-27

## 2023-03-27 VITALS
WEIGHT: 170.8 LBS | SYSTOLIC BLOOD PRESSURE: 132 MMHG | BODY MASS INDEX: 25.3 KG/M2 | DIASTOLIC BLOOD PRESSURE: 70 MMHG | HEIGHT: 69 IN | TEMPERATURE: 98.6 F | HEART RATE: 100 BPM | OXYGEN SATURATION: 99 % | RESPIRATION RATE: 20 BRPM

## 2023-03-27 DIAGNOSIS — L98.8 PILONIDAL DISEASE: Primary | ICD-10-CM

## 2023-03-27 DIAGNOSIS — Z51.89 ENCOUNTER FOR WOUND CARE: ICD-10-CM

## 2023-03-27 NOTE — PROGRESS NOTES
"Progress Note - General Surgery   Blas Layne 59 y o  male MRN: [id number]  Encounter: [id number]    Assessment/Plan     57M approximately 6 weeks status post incision and drainage of complex left perianal abscess associated with significant pilonidal disease  Recovering very well  Completed antibiotics  Ongoing daily wound care with the help of his wife and VNA  Wound site is healing in well, nearly entirely healed, see photos, no further slough, no active infection  Site cleansed and site repacked and dressed  Discussed that this procedure managed the acute abscess and that it was not definitive excision of his pilonidal disease which is still present, he understands this  He will reach out on an as needed basis in the future and continue with weekly VNA and help from his family until the wound finishes its final phase of healing  Subjective       Chief Complaint   Patient presents with   • Follow-up      Wound healing very well, no setbacks, much improved, no pain, no fevers, VNA visits continue weekly  Review of Systems   Constitutional: Negative  Skin: Positive for wound  Negative for color change  All other systems reviewed and are negative  The following portions of the patient's history were reviewed and updated as appropriate: allergies, current medications, past family history, past medical history, past social history, past surgical history and problem list     Objective      Blood pressure 132/70, pulse 100, temperature 98 6 °F (37 °C), resp  rate 20, height 5' 9\" (1 753 m), weight 77 5 kg (170 lb 12 8 oz), SpO2 99 %  Physical Exam  Vitals reviewed  Constitutional:       General: He is not in acute distress  HENT:      Head: Normocephalic  Mouth/Throat:      Mouth: Mucous membranes are moist    Eyes:      Pupils: Pupils are equal, round, and reactive to light  Cardiovascular:      Rate and Rhythm: Tachycardia present     Pulmonary:      Effort: Pulmonary effort is normal  " No respiratory distress  Musculoskeletal:         General: Normal range of motion  Cervical back: Normal range of motion  Skin:     General: Skin is warm and dry  Capillary Refill: Capillary refill takes less than 2 seconds  Comments: Left sided gluteal wound extending into known pilonidal disease, much improved, no active infection, small sliver of healthy granulation tissue remains otherwise the previously large open wound is nearly healed, surrounding area is nontender, no expressible purulence, see photos   Neurological:      General: No focal deficit present  Mental Status: He is alert  Psychiatric:         Mood and Affect: Mood normal          Signature:   Cadence Nur MD  Date: 3/27/2023 Time: 4:35 PM

## 2023-03-29 ENCOUNTER — HOME CARE VISIT (OUTPATIENT)
Dept: HOME HEALTH SERVICES | Facility: HOME HEALTHCARE | Age: 58
End: 2023-03-29

## 2023-03-29 VITALS
TEMPERATURE: 97.4 F | SYSTOLIC BLOOD PRESSURE: 130 MMHG | HEART RATE: 94 BPM | DIASTOLIC BLOOD PRESSURE: 76 MMHG | OXYGEN SATURATION: 98 % | RESPIRATION RATE: 16 BRPM

## 2023-04-03 ENCOUNTER — HOME CARE VISIT (OUTPATIENT)
Dept: HOME HEALTH SERVICES | Facility: HOME HEALTHCARE | Age: 58
End: 2023-04-03

## 2023-04-03 VITALS
RESPIRATION RATE: 18 BRPM | TEMPERATURE: 98 F | HEART RATE: 76 BPM | SYSTOLIC BLOOD PRESSURE: 124 MMHG | OXYGEN SATURATION: 100 % | DIASTOLIC BLOOD PRESSURE: 68 MMHG

## 2023-04-24 ENCOUNTER — OFFICE VISIT (OUTPATIENT)
Dept: FAMILY MEDICINE CLINIC | Facility: CLINIC | Age: 58
End: 2023-04-24

## 2023-04-24 VITALS
HEIGHT: 69 IN | RESPIRATION RATE: 20 BRPM | TEMPERATURE: 96.4 F | SYSTOLIC BLOOD PRESSURE: 128 MMHG | DIASTOLIC BLOOD PRESSURE: 70 MMHG | BODY MASS INDEX: 24.88 KG/M2 | HEART RATE: 84 BPM | WEIGHT: 168 LBS

## 2023-04-24 DIAGNOSIS — N18.4 CHRONIC KIDNEY DISEASE, STAGE 4 (SEVERE) (HCC): ICD-10-CM

## 2023-04-24 DIAGNOSIS — Z11.59 NEED FOR HEPATITIS C SCREENING TEST: ICD-10-CM

## 2023-04-24 DIAGNOSIS — Z13.6 SCREENING FOR CARDIOVASCULAR CONDITION: ICD-10-CM

## 2023-04-24 DIAGNOSIS — F17.210 SMOKING GREATER THAN 20 PACK YEARS: ICD-10-CM

## 2023-04-24 DIAGNOSIS — Z12.2 ENCOUNTER FOR SCREENING FOR LUNG CANCER: ICD-10-CM

## 2023-04-24 DIAGNOSIS — L98.8 PILONIDAL DISEASE: ICD-10-CM

## 2023-04-24 DIAGNOSIS — Z12.11 SCREEN FOR COLON CANCER: ICD-10-CM

## 2023-04-24 DIAGNOSIS — Z00.00 MEDICARE ANNUAL WELLNESS VISIT, SUBSEQUENT: Primary | ICD-10-CM

## 2023-04-24 DIAGNOSIS — C67.9 MALIGNANT NEOPLASM OF URINARY BLADDER, UNSPECIFIED SITE (HCC): ICD-10-CM

## 2023-04-24 DIAGNOSIS — M25.50 POLYARTHRALGIA: ICD-10-CM

## 2023-04-24 RX ORDER — NICOTINE 21 MG/24HR
1 PATCH, TRANSDERMAL 24 HOURS TRANSDERMAL EVERY 24 HOURS
Qty: 28 PATCH | Refills: 0 | Status: SHIPPED | OUTPATIENT
Start: 2023-04-24

## 2023-04-24 NOTE — PROGRESS NOTES
Assessment and Plan:     Problem List Items Addressed This Visit        Musculoskeletal and Integument    Pilonidal disease       Other    Polyarthralgia   Other Visit Diagnoses     Malignant neoplasm of urinary bladder, unspecified site ([facility] )    -  Primary    Chronic kidney disease, stage 4 (severe) ([facility] )        Medicare annual wellness visit, subsequent        Relevant Orders    HIV 1/2 AB/AG w Reflex [facility] for 2 yr old and above    Hepatitis C antibody    Lipid panel    Cologuard    Screening for cardiovascular condition        Relevant Orders    Lipid panel    Need for hepatitis C screening test        Relevant Orders    Hepatitis C antibody    Encounter for screening for lung cancer        Relevant Orders    CT lung screening program    Smoking greater than 20 pack years        Relevant Orders    CT lung screening program           Preventive health issues were discussed with patient, and age appropriate screening tests were ordered as noted in patient's After Visit Summary  Personalized health advice and appropriate referrals for health education or preventive services given if needed, as noted in patient's After Visit Summary       History of Present Illness:     Patient presents for a Medicare Wellness Visit    HPI   Patient Care Team:  Saul Pollock DO as PCP - General (Family Medicine)  Kennedy Jenkins MD (General Surgery)     Review of Systems:     Review of Systems     Problem List:     Patient Active Problem List   Diagnosis   • Perianal abscess   • Pilonidal disease   • BALDEMAR (acute kidney injury) ([facility] )   • Polyarthralgia   • History of bladder cancer   • Primary hypertension   • PMR (polymyalgia rheumatica) ([facility] )   • Encounter for wound care      Past Medical and Surgical History:     Past Medical History:   Diagnosis Date   • 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    • Calculus of kidney    • Hyperlipidemia    • Hypertension    • Malignant neoplasm of ureteric orifice ([facility] )    • Other male erectile dysfunction      Past Surgical History:   Procedure Laterality Date   • CYSTECTOMY W/ URETEROILEAL CONDUIT  2009   • INCISION AND DRAINAGE OF WOUND N/A 2/12/2023    Procedure: INCISION AND DRAINAGE (I&D) BUTTOCK, PERIANAL AND PILONIDAL ABSCESS;  Surgeon: Duke Groves MD;  Location: [facility];  Service: General      Family History:     Family History   Problem Relation Age of Onset   • Aneurysm Mother    • Heart failure Father       Social History:     Social History     Socioeconomic History   • Marital status: /Civil Union     Spouse name: None   • Number of children: None   • Years of education: None   • Highest education level: None   Occupational History   • None   Tobacco Use   • Smoking status: Every Day     Packs/day: 0 50     Types: Cigarettes   • Smokeless tobacco: Never   • Tobacco comments:     01/31/2022-- max 0 5pk a day   Vaping Use   • Vaping Use: Never used   Substance and Sexual Activity   • Alcohol use: Not Currently   • Drug use: No   • Sexual activity: None   Other Topics Concern   • None   Social History Narrative   • None     Social Determinants of Health     Financial Resource Strain: Low Risk    • Difficulty of Paying Living Expenses: Not very hard   Food Insecurity: No Food Insecurity   • Worried About Running Out of Food in the Last Year: Never true   • Ran Out of Food in the Last Year: Never true   Transportation Needs: No Transportation Needs   • Lack of Transportation (Medical): No   • Lack of Transportation (Non-Medical):  No   Physical Activity: Not on file   Stress: Not on file   Social Connections: Not on file   Intimate Partner Violence: Not on file   Housing Stability: Low Risk    • Unable to Pay for Housing in the Last Year: No   • Number of Places Lived in the Last Year: 1   • Unstable Housing in the Last Year: No      Medications and Allergies:     Current Outpatient Medications   Medication Sig Dispense Refill   • alprostadil (EDEX) 20 MCG injection 20 mcg by Intracavitary route as needed for erectile dysfunction (Patient not taking: Reported on 2/16/2023) 5 vial 6   • amLODIPine (NORVASC) 10 mg tablet TAKE 1 TABLET BY MOUTH EVERY DAY 90 tablet 1   • docusate sodium (COLACE) 100 mg capsule Take 1 capsule (100 mg total) by mouth 2 (two) times a day  0   • dronabinol (MARINOL) 5 MG capsule TAKE 1 CAPSULE BY MOUTH EVERY DAY (Patient taking differently: as needed) 90 capsule 0   • famotidine (PEPCID) 20 mg tablet TAKE 1 TABLET BY MOUTH TWICE A DAY (Patient not taking: No sig reported) 60 tablet 2   • metoprolol succinate (TOPROL-XL) 25 mg 24 hr tablet TAKE 1 TABLET (25 MG TOTAL) BY MOUTH DAILY  90 tablet 1   • Multiple Vitamin (DAILY VALUE MULTIVITAMIN) TABS Take 1 tablet by mouth daily     • Potassium Citrate ER 15 MEQ (1620 MG) TBCR Take 1 tablet by mouth in the morning NOT TAKING     • predniSONE 10 mg tablet TAKE 1 TABLET BY MOUTH EVERY DAY (Patient taking differently: Take 1 mg by mouth daily) 30 tablet 0   • PROSTAGLANDIN PGE1 INJECTABLE 20 MCG/ML, STANDARD, IJ SOLN Inject 0 3mL to 1 0mL IC 20 minutes prior to intercourse  (Patient not taking: Reported on 9/25/2020) 10 mL 0   • psyllium (METAMUCIL) packet Take 1 packet by mouth daily Do not start before February 16, 2023   0   • sodium bicarbonate 650 mg tablet Take by mouth       No current facility-administered medications for this visit  No Known Allergies   Immunizations:     Immunization History   Administered Date(s) Administered   • Influenza Split High Dose Preservative Free IM 11/02/2016   • Tdap 02/22/2019      Health Maintenance:         Topic Date Due   • HIV Screening  Never done   • Colorectal Cancer Screening  Never done   • Hepatitis C Screening  Completed         Topic Date Due   • Pneumococcal Vaccine: Pediatrics (0 to 5 Years) and At-Risk Patients (6 to 59 Years) (1 - PCV) Never done      Medicare Screening Tests and Risk Assessments:     Annual Wellness Visit  No results found  "Physical Exam:     /70   Pulse 84   Temp (!) 96 4 °F (35 8 °C)   Resp 20   Ht 5' 9\" (1 753 m)   Wt 76 2 kg (168 lb)   BMI 24 81 kg/m²     Physical Exam     Narciso Etienne DO  I discussed with him that he is a candidate for lung cancer CT screening  The following Shared Decision-Making points were covered:  1  Benefits of screening were discussed, including the rates of reduction in death from lung cancer and other causes  Harms of screening were reviewed, including false positive tests, radiation exposure levels, risks of invasive procedures, risks of complications of screening, and risk of overdiagnosis  2  I counseled on the importance of adherence to annual lung cancer LDCT screening, impact of co-morbidities, and ability or willingness to undergo diagnosis and treatment  3  I counseled on the importance of maintaining abstinence as a former smoker or was counseled on the importance of smoking cessation if a current smoker    Review of Eligibility Criteria: He meets all of the criteria for Lung Cancer Screening  · He is 59 y o    · He has 20 pack year tobacco history and is a current smoker or has quit within the past 15 years  · He presents no signs or symptoms of lung cancer    After discussion, the patient decided to elect lung cancer screening    "

## 2023-04-24 NOTE — PATIENT INSTRUCTIONS
Medicare Preventive Visit Patient Instructions  Thank you for completing your Welcome to Medicare Visit or Medicare Annual Wellness Visit today  Your next wellness visit will be due in one year (4/24/2024)  The screening/preventive services that you may require over the next 5-10 years are detailed below  Some tests may not apply to you based off risk factors and/or age  Screening tests ordered at today's visit but not completed yet may show as past due  Also, please note that scanned in results may not display below  Preventive Screenings:  Service Recommendations Previous Testing/Comments   Colorectal Cancer Screening  · Colonoscopy    · Fecal Occult Blood Test (FOBT)/Fecal Immunochemical Test (FIT)  · Fecal DNA/Cologuard Test  · Flexible Sigmoidoscopy Age: 39-70 years old   Colonoscopy: every 10 years (May be performed more frequently if at higher risk)  OR  FOBT/FIT: every 1 year  OR  Cologuard: every 3 years  OR  Sigmoidoscopy: every 5 years  Screening may be recommended earlier than age 39 if at higher risk for colorectal cancer  Also, an individualized decision between you and your healthcare provider will decide whether screening between the ages of 74-80 would be appropriate   Colonoscopy: Not on file  FOBT/FIT: Not on file  Cologuard: Not on file  Sigmoidoscopy: Not on file          Prostate Cancer Screening Individualized decision between patient and health care provider in men between ages of 53-78   Medicare will cover every 12 months beginning on the day after your 50th birthday PSA: No results in last 5 years           Hepatitis C Screening Once for adults born between 1945 and 1965  More frequently in patients at high risk for Hepatitis C Hep C Antibody: 05/31/2022    Screening Current   Diabetes Screening 1-2 times per year if you're at risk for diabetes or have pre-diabetes Fasting glucose: 108 mg/dL (1/29/2020)  A1C: 5 5 % (1/29/2020)  Screening Current   Cholesterol Screening Once every 5 years if you don't have a lipid disorder  May order more often based on risk factors  Lipid panel: 01/29/2020  Screening Current      Other Preventive Screenings Covered by Medicare:  1  Abdominal Aortic Aneurysm (AAA) Screening: covered once if your at risk  You're considered to be at risk if you have a family history of AAA or a male between the age of 73-68 who smoking at least 100 cigarettes in your lifetime  2  Lung Cancer Screening: covers low dose CT scan once per year if you meet all of the following conditions: (1) Age 50-69; (2) No signs or symptoms of lung cancer; (3) Current smoker or have quit smoking within the last 15 years; (4) You have a tobacco smoking history of at least 20 pack years (packs per day x number of years you smoked); (5) You get a written order from a healthcare provider  3  Glaucoma Screening: covered annually if you're considered high risk: (1) You have diabetes OR (2) Family history of glaucoma OR (3)  aged 48 and older OR (3)  American aged 72 and older  3  Osteoporosis Screening: covered every 2 years if you meet one of the following conditions: (1) Have a vertebral abnormality; (2) On glucocorticoid therapy for more than 3 months; (3) Have primary hyperparathyroidism; (4) On osteoporosis medications and need to assess response to drug therapy  5  HIV Screening: covered annually if you're between the age of 12-76  Also covered annually if you are younger than 13 and older than 72 with risk factors for HIV infection  For pregnant patients, it is covered up to 3 times per pregnancy      Immunizations:  Immunization Recommendations   Influenza Vaccine Annual influenza vaccination during flu season is recommended for all persons aged >= 6 months who do not have contraindications   Pneumococcal Vaccine   * Pneumococcal conjugate vaccine = PCV13 (Prevnar 13), PCV15 (Vaxneuvance), PCV20 (Prevnar 20)  * Pneumococcal polysaccharide vaccine = PPSV23 (Pneumovax) Adults 25-60 years old: 1-3 doses may be recommended based on certain risk factors  Adults 72 years old: 1-2 doses may be recommended based off what pneumonia vaccine you previously received   Hepatitis B Vaccine 3 dose series if at intermediate or high risk (ex: diabetes, end stage renal disease, liver disease)   Tetanus (Td) Vaccine - COST NOT COVERED BY MEDICARE PART B Following completion of primary series, a booster dose should be given every 10 years to maintain immunity against tetanus  Td may also be given as tetanus wound prophylaxis  Tdap Vaccine - COST NOT COVERED BY MEDICARE PART B Recommended at least once for all adults  For pregnant patients, recommended with each pregnancy  Shingles Vaccine (Shingrix) - COST NOT COVERED BY MEDICARE PART B  2 shot series recommended in those aged 48 and above     Health Maintenance Due:      Topic Date Due   • HIV Screening  Never done   • Colorectal Cancer Screening  Never done   • Hepatitis C Screening  Completed     Immunizations Due:      Topic Date Due   • Pneumococcal Vaccine: Pediatrics (0 to 5 Years) and At-Risk Patients (6 to 59 Years) (1 - PCV) Never done     Advance Directives   What are advance directives? Advance directives are legal documents that state your wishes and plans for medical care  These plans are made ahead of time in case you lose your ability to make decisions for yourself  Advance directives can apply to any medical decision, such as the treatments you want, and if you want to donate organs  What are the types of advance directives? There are many types of advance directives, and each state has rules about how to use them  You may choose a combination of any of the following:  · Living will: This is a written record of the treatment you want  You can also choose which treatments you do not want, which to limit, and which to stop at a certain time  This includes surgery, medicine, IV fluid, and tube feedings     · Durable power of  for [facility]): This is a written record that states who you want to make healthcare choices for you when you are unable to make them for yourself  This person, called a proxy, is usually a family member or a friend  You may choose more than 1 proxy  · Do not resuscitate (DNR) order:  A DNR order is used in case your heart stops beating or you stop breathing  It is a request not to have certain forms of treatment, such as CPR  A DNR order may be included in other types of advance directives  · Medical directive: This covers the care that you want if you are in a coma, near death, or unable to make decisions for yourself  You can list the treatments you want for each condition  Treatment may include pain medicine, surgery, blood transfusions, dialysis, IV or tube feedings, and a ventilator (breathing machine)  · Values history: This document has questions about your views, beliefs, and how you feel and think about life  This information can help others choose the care that you would choose  Why are advance directives important? An advance directive helps you control your care  Although spoken wishes may be used, it is better to have your wishes written down  Spoken wishes can be misunderstood, or not followed  Treatments may be given even if you do not want them  An advance directive may make it easier for your family to make difficult choices about your care  Cigarette Smoking and Your Health   Risks to your health if you smoke:  Nicotine and other chemicals found in tobacco damage every cell in your body  Even if you are a light smoker, you have an increased risk for cancer, heart disease, and lung disease  If you are pregnant or have diabetes, smoking increases your risk for complications  Benefits to your health if you stop smoking:   · You decrease respiratory symptoms such as coughing, wheezing, and shortness of breath     · You reduce your risk for cancers of the lung, mouth, throat, kidney, bladder, pancreas, stomach, and cervix  If you already have cancer, you increase the benefits of chemotherapy  You also reduce your risk for cancer returning or a second cancer from developing  · You reduce your risk for heart disease, blood clots, heart attack, and stroke  · You reduce your risk for lung infections, and diseases such as pneumonia, asthma, chronic bronchitis, and emphysema  · Your circulation improves  More oxygen can be delivered to your body  If you have diabetes, you lower your risk for complications, such as kidney, artery, and eye diseases  You also lower your risk for nerve damage  Nerve damage can lead to amputations, poor vision, and blindness  · You improve your body's ability to heal and to fight infections  For more information and support to stop smoking:   · [employer]  Phone: 3- 410 - 824-1619  Web Address: hiogi   [address] Information is for End User's use only and may not be sold, redistributed or otherwise used for commercial purposes  All illustrations and images included in CareNotes® are the copyrighted property of [employer] , [employer]  or [facility] Preventive Visit Patient Instructions  Thank you for completing your Welcome to Medicare Visit or Medicare Annual Wellness Visit today  Your next wellness visit will be due in one year (4/24/2024)  The screening/preventive services that you may require over the next 5-10 years are detailed below  Some tests may not apply to you based off risk factors and/or age  Screening tests ordered at today's visit but not completed yet may show as past due  Also, please note that scanned in results may not display below    Preventive Screenings:  Service Recommendations Previous Testing/Comments   Colorectal Cancer Screening  · Colonoscopy    · Fecal Occult Blood Test (FOBT)/Fecal Immunochemical Test (FIT)  · Fecal DNA/Cologuard Test  · Flexible Sigmoidoscopy Age: 39-70 years old   Colonoscopy: every 10 years (May be performed more frequently if at higher risk)  OR  FOBT/FIT: every 1 year  OR  Cologuard: every 3 years  OR  Sigmoidoscopy: every 5 years  Screening may be recommended earlier than age 39 if at higher risk for colorectal cancer  Also, an individualized decision between you and your healthcare provider will decide whether screening between the ages of 74-80 would be appropriate  Colonoscopy: Not on file  FOBT/FIT: Not on file  Cologuard: Not on file  Sigmoidoscopy: Not on file          Prostate Cancer Screening Individualized decision between patient and health care provider in men between ages of 53-78   Medicare will cover every 12 months beginning on the day after your 50th birthday PSA: No results in last 5 years           Hepatitis C Screening Once for adults born between 1945 and 1965  More frequently in patients at high risk for Hepatitis C Hep C Antibody: 05/31/2022    Screening Current   Diabetes Screening 1-2 times per year if you're at risk for diabetes or have pre-diabetes Fasting glucose: 108 mg/dL (1/29/2020)  A1C: 5 5 % (1/29/2020)  Screening Current   Cholesterol Screening Once every 5 years if you don't have a lipid disorder  May order more often based on risk factors  Lipid panel: 01/29/2020  Screening Current      Other Preventive Screenings Covered by Medicare:  6  Abdominal Aortic Aneurysm (AAA) Screening: covered once if your at risk  You're considered to be at risk if you have a family history of AAA or a male between the age of 73-68 who smoking at least 100 cigarettes in your lifetime    7  Lung Cancer Screening: covers low dose CT scan once per year if you meet all of the following conditions: (1) Age 50-69; (2) No signs or symptoms of lung cancer; (3) Current smoker or have quit smoking within the last 15 years; (4) You have a tobacco smoking history of at least 20 pack years (packs per day x number of years you smoked); (5) You get a written order from a healthcare provider  8  Glaucoma Screening: covered annually if you're considered high risk: (1) You have diabetes OR (2) Family history of glaucoma OR (3)  aged 48 and older OR (3)  American aged 72 and older  5  Osteoporosis Screening: covered every 2 years if you meet one of the following conditions: (1) Have a vertebral abnormality; (2) On glucocorticoid therapy for more than 3 months; (3) Have primary hyperparathyroidism; (4) On osteoporosis medications and need to assess response to drug therapy  10  HIV Screening: covered annually if you're between the age of 12-76  Also covered annually if you are younger than 13 and older than 72 with risk factors for HIV infection  For pregnant patients, it is covered up to 3 times per pregnancy  Immunizations:  Immunization Recommendations   Influenza Vaccine Annual influenza vaccination during flu season is recommended for all persons aged >= 6 months who do not have contraindications   Pneumococcal Vaccine   * Pneumococcal conjugate vaccine = PCV13 (Prevnar 13), PCV15 (Vaxneuvance), PCV20 (Prevnar 20)  * Pneumococcal polysaccharide vaccine = PPSV23 (Pneumovax) Adults 25-60 years old: 1-3 doses may be recommended based on certain risk factors  Adults 72 years old: 1-2 doses may be recommended based off what pneumonia vaccine you previously received   Hepatitis B Vaccine 3 dose series if at intermediate or high risk (ex: diabetes, end stage renal disease, liver disease)   Tetanus (Td) Vaccine - COST NOT COVERED BY MEDICARE PART B Following completion of primary series, a booster dose should be given every 10 years to maintain immunity against tetanus  Td may also be given as tetanus wound prophylaxis  Tdap Vaccine - COST NOT COVERED BY MEDICARE PART B Recommended at least once for all adults  For pregnant patients, recommended with each pregnancy     Shingles Vaccine (Shingrix) - COST NOT COVERED BY MEDICARE PART B  2 shot series recommended in those aged 48 and above     Health Maintenance Due:      Topic Date Due   • HIV Screening  Never done   • Colorectal Cancer Screening  Never done   • Hepatitis C Screening  Completed     Immunizations Due:      Topic Date Due   • Pneumococcal Vaccine: Pediatrics (0 to 5 Years) and At-Risk Patients (6 to 59 Years) (1 - PCV) Never done     Advance Directives   What are advance directives? Advance directives are legal documents that state your wishes and plans for medical care  These plans are made ahead of time in case you lose your ability to make decisions for yourself  Advance directives can apply to any medical decision, such as the treatments you want, and if you want to donate organs  What are the types of advance directives? There are many types of advance directives, and each state has rules about how to use them  You may choose a combination of any of the following:  · Living will: This is a written record of the treatment you want  You can also choose which treatments you do not want, which to limit, and which to stop at a certain time  This includes surgery, medicine, IV fluid, and tube feedings  · Durable power of  for healthcare [facility]): This is a written record that states who you want to make healthcare choices for you when you are unable to make them for yourself  This person, called a proxy, is usually a family member or a friend  You may choose more than 1 proxy  · Do not resuscitate (DNR) order:  A DNR order is used in case your heart stops beating or you stop breathing  It is a request not to have certain forms of treatment, such as CPR  A DNR order may be included in other types of advance directives  · Medical directive: This covers the care that you want if you are in a coma, near death, or unable to make decisions for yourself  You can list the treatments you want for each condition   Treatment may include pain medicine, surgery, blood transfusions, dialysis, IV or tube feedings, and a ventilator (breathing machine)  · Values history: This document has questions about your views, beliefs, and how you feel and think about life  This information can help others choose the care that you would choose  Why are advance directives important? An advance directive helps you control your care  Although spoken wishes may be used, it is better to have your wishes written down  Spoken wishes can be misunderstood, or not followed  Treatments may be given even if you do not want them  An advance directive may make it easier for your family to make difficult choices about your care  Cigarette Smoking and Your Health   Risks to your health if you smoke:  Nicotine and other chemicals found in tobacco damage every cell in your body  Even if you are a light smoker, you have an increased risk for cancer, heart disease, and lung disease  If you are pregnant or have diabetes, smoking increases your risk for complications  Benefits to your health if you stop smoking:   · You decrease respiratory symptoms such as coughing, wheezing, and shortness of breath  · You reduce your risk for cancers of the lung, mouth, throat, kidney, bladder, pancreas, stomach, and cervix  If you already have cancer, you increase the benefits of chemotherapy  You also reduce your risk for cancer returning or a second cancer from developing  · You reduce your risk for heart disease, blood clots, heart attack, and stroke  · You reduce your risk for lung infections, and diseases such as pneumonia, asthma, chronic bronchitis, and emphysema  · Your circulation improves  More oxygen can be delivered to your body  If you have diabetes, you lower your risk for complications, such as kidney, artery, and eye diseases  You also lower your risk for nerve damage  Nerve damage can lead to amputations, poor vision, and blindness  · You improve your body's ability to heal and to fight infections    For more information and support to stop smoking:   · [employer]oke[employer]  gov  Phone: 5- 067 - 690-4991  Web Address: www smokefree   [address] Information is for End User's use only and may not be sold, redistributed or otherwise used for commercial purposes   All illustrations and images included in CareNotes® are the copyrighted property of [employer]  or [address] [place] [address]

## 2023-04-24 NOTE — PROGRESS NOTES
Assessment and Plan: History of bladder cancer currently in remission    Chronic kidney disease stage IV under care of nephrology    Polyarthralgia especially of the hands the patient was advised to try Voltaren gel and over-the-counter turmeric    Pilonidal disease with abscess postsurgical drainage     Problem List Items Addressed This Visit        Musculoskeletal and Integument    Pilonidal disease       Other    Polyarthralgia    Relevant Medications    Diclofenac Sodium (VOLTAREN) 1 %   Other Visit Diagnoses     Medicare annual wellness visit, subsequent    -  Primary    Relevant Orders    HIV 1/2 AB/AG w Reflex [facility] for 2 yr old and above    Hepatitis C antibody    Lipid panel    Malignant neoplasm of urinary bladder, unspecified site ([facility] [address] )        Chronic kidney disease, stage 4 (severe) ([facility] [address] )        Screening for cardiovascular condition        Relevant Orders    Lipid panel    Need for hepatitis C screening test        Relevant Orders    Hepatitis C antibody    Encounter for screening for lung cancer        Relevant Orders    CT lung screening program    Smoking greater than 20 pack years        Relevant Medications    nicotine (NICODERM CQ) 21 mg/24 hr TD 24 hr patch    Other Relevant Orders    CT lung screening program    Screen for colon cancer        Relevant Orders    Cologuard          Depression Screening and Follow-up Plan: Patient was screened for depression during today's encounter  They screened negative with a PHQ-2 score of 0  Tobacco Cessation Counseling: The patient is sincerely urged to quit consumption of tobacco  He is ready to quit tobacco  Medication options discussed  Nicotine patch was prescribed  Lung Cancer Screening Shared Decision Making: I discussed with him that he is a candidate for lung cancer CT screening  The following Shared Decision-Making points were covered:  1   Benefits of screening were discussed, including the rates of reduction in death from lung cancer and other causes  Harms of screening were reviewed, including false positive tests, radiation exposure levels, risks of invasive procedures, risks of complications of screening, and risk of overdiagnosis  2  I counseled on the importance of adherence to annual lung cancer LDCT screening, impact of co-morbidities, and ability or willingness to undergo diagnosis and treatment  3  I counseled on the importance of maintaining abstinence as a former smoker or was counseled on the importance of smoking cessation if a current smoker    Review of Eligibility Criteria: He meets all of the criteria for Lung Cancer Screening    - He is 59 y o    - He has 20 pack year tobacco history and is a current smoker or has quit within the past 15 years  - He presents no signs or symptoms of lung cancer    After discussion, the patient decided to elect lung cancer screening  Preventive health issues were discussed with patient, and age appropriate screening tests were ordered as noted in patient's After Visit Summary  Personalized health advice and appropriate referrals for health education or preventive services given if needed, as noted in patient's After Visit Summary  History of Present Illness:     Patient presents for a Medicare Wellness Visit    HPI   Patient Care Team:  John Verdin DO as PCP - General (Family Medicine)  Stalin Luna MD (General Surgery)     Review of Systems:     Review of Systems   Constitutional: Negative for chills and fever  HENT: Negative for ear pain and sore throat  Eyes: Negative for pain and visual disturbance  Respiratory: Negative for cough and shortness of breath  Cardiovascular: Negative for chest pain and palpitations  Gastrointestinal: Negative for abdominal pain and vomiting  Genitourinary: Negative for dysuria and hematuria  Musculoskeletal: Negative for arthralgias and back pain  Skin: Negative for color change and rash     Neurological: Negative for seizures and syncope  All other systems reviewed and are negative         Problem List:     Patient Active Problem List   Diagnosis   • Perianal abscess   • Pilonidal disease   • BALDEMAR (acute kidney injury) ([facility] )   • Polyarthralgia   • History of bladder cancer   • Primary hypertension   • PMR (polymyalgia rheumatica) ([facility] )   • Encounter for wound care      Past Medical and Surgical History:     Past Medical History:   Diagnosis Date   • 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    • Calculus of kidney    • Hyperlipidemia    • Hypertension    • Malignant neoplasm of ureteric orifice ([facility] )    • Other male erectile dysfunction      Past Surgical History:   Procedure Laterality Date   • CYSTECTOMY W/ URETEROILEAL CONDUIT  2009   • INCISION AND DRAINAGE OF WOUND N/A 2/12/2023    Procedure: INCISION AND DRAINAGE (I&D) BUTTOCK, PERIANAL AND PILONIDAL ABSCESS;  Surgeon: Yo Taylor MD;  Location: [facility];  Service: General      Family History:     Family History   Problem Relation Age of Onset   • Aneurysm Mother    • Heart failure Father       Social History:     Social History     Socioeconomic History   • Marital status: /Civil Union     Spouse name: None   • Number of children: None   • Years of education: None   • Highest education level: None   Occupational History   • None   Tobacco Use   • Smoking status: Every Day     Packs/day: 0 50     Types: Cigarettes   • Smokeless tobacco: Never   • Tobacco comments:     01/31/2022-- max 0 5pk a day   Vaping Use   • Vaping Use: Never used   Substance and Sexual Activity   • Alcohol use: Not Currently   • Drug use: No   • Sexual activity: None   Other Topics Concern   • None   Social History Narrative   • None     Social Determinants of Health     Financial Resource Strain: Low Risk    • Difficulty of Paying Living Expenses: Not very hard   Food Insecurity: No Food Insecurity   • Worried About Running Out of Food in the Last Year: Never true   • Ran Out of Food in the Last Year: Never true   Transportation Needs: No Transportation Needs   • Lack of Transportation (Medical): No   • Lack of Transportation (Non-Medical): No   Physical Activity: Not on file   Stress: Not on file   Social Connections: Not on file   Intimate Partner Violence: Not on file   Housing Stability: Low Risk    • Unable to Pay for Housing in the Last Year: No   • Number of Places Lived in the Last Year: 1   • Unstable Housing in the Last Year: No      Medications and Allergies:     Current Outpatient Medications   Medication Sig Dispense Refill   • Diclofenac Sodium (VOLTAREN) 1 % Apply 2 g topically 4 (four) times a day 100 g 1   • nicotine (NICODERM CQ) 21 mg/24 hr TD 24 hr patch Place 1 patch on the skin over 24 hours every 24 hours 28 patch 0   • alprostadil (EDEX) 20 MCG injection 20 mcg by Intracavitary route as needed for erectile dysfunction (Patient not taking: Reported on 2/16/2023) 5 vial 6   • amLODIPine (NORVASC) 10 mg tablet TAKE 1 TABLET BY MOUTH EVERY DAY 90 tablet 1   • docusate sodium (COLACE) 100 mg capsule Take 1 capsule (100 mg total) by mouth 2 (two) times a day  0   • dronabinol (MARINOL) 5 MG capsule TAKE 1 CAPSULE BY MOUTH EVERY DAY (Patient taking differently: as needed) 90 capsule 0   • famotidine (PEPCID) 20 mg tablet TAKE 1 TABLET BY MOUTH TWICE A DAY (Patient not taking: No sig reported) 60 tablet 2   • metoprolol succinate (TOPROL-XL) 25 mg 24 hr tablet TAKE 1 TABLET (25 MG TOTAL) BY MOUTH DAILY  90 tablet 1   • Multiple Vitamin (DAILY VALUE MULTIVITAMIN) TABS Take 1 tablet by mouth daily     • Potassium Citrate ER 15 MEQ (1620 MG) TBCR Take 1 tablet by mouth in the morning NOT TAKING     • predniSONE 10 mg tablet TAKE 1 TABLET BY MOUTH EVERY DAY (Patient taking differently: Take 1 mg by mouth daily) 30 tablet 0   • PROSTAGLANDIN PGE1 INJECTABLE 20 MCG/ML, STANDARD, IJ SOLN Inject 0 3mL to 1 0mL IC 20 minutes prior to intercourse   (Patient not taking: Reported on 9/25/2020) 10 mL 0   • psyllium (METAMUCIL) packet Take 1 packet by mouth daily Do not start before February 16, 2023   0   • sodium bicarbonate 650 mg tablet Take by mouth       No current facility-administered medications for this visit  No Known Allergies   Immunizations:     Immunization History   Administered Date(s) Administered   • Influenza Split High Dose Preservative Free IM 11/02/2016   • Tdap 02/22/2019      Health Maintenance:         Topic Date Due   • HIV Screening  Never done   • Colorectal Cancer Screening  Never done   • Hepatitis C Screening  Completed         Topic Date Due   • Pneumococcal Vaccine: Pediatrics (0 to 5 Years) and At-Risk Patients (6 to 59 Years) (1 - PCV) Never done      Medicare Screening Tests and Risk Assessments:         Health Risk Assessment:   Patient rates overall health as good  Patient feels that their physical health rating is slightly better  Eyesight was rated as same  Hearing was rated as same  Patient feels that their emotional and mental health rating is same  Patients states they are sometimes angry  Patient states they are sometimes unusually tired/fatigued  Pain experienced in the last 7 days has been some  Patient's pain rating has been 4/10  Patient states that he has experienced no weight loss or gain in last 6 months  Depression Screening:   PHQ-2 Score: 0      Fall Risk Screening: In the past year, patient has experienced: no history of falling in past year      Home Safety:  Patient does not have trouble with stairs inside or outside of their home  Patient has working smoke alarms and has working carbon monoxide detector  Home safety hazards include: none  Nutrition:   Current diet is Regular  Medications:   Patient is currently taking over-the-counter supplements  OTC medications include: see medication list  Patient is able to manage medications       Activities of Daily Living (ADLs)/Instrumental Activities of Daily Living (IADLs): "  Walk and transfer into and out of bed and chair?: Yes  Dress and groom yourself?: Yes    Bathe or shower yourself?: Yes    Feed yourself? Yes  Do your laundry/housekeeping?: Yes  Manage your money, pay your bills and track your expenses?: Yes  Make your own meals?: Yes    Do your own shopping?: Yes    Previous Hospitalizations:   Any hospitalizations or ED visits within the last 12 months?: Yes    How many hospitalizations have you had in the last year?: 1-2    Advance Care Planning:   Living will: No    Durable POA for healthcare: No      PREVENTIVE SCREENINGS      Cardiovascular Screening:    General: Screening Current      Diabetes Screening:     General: Screening Current      Abdominal Aortic Aneurysm (AAA) Screening:    Risk factors include: tobacco use        Lung Cancer Screening:     General: Screening Not Indicated      Hepatitis C Screening:    General: Screening Current    Screening, Brief Intervention, and Referral to Treatment (SBIRT)    Screening  Typical number of drinks in a day: 0  Typical number of drinks in a week: 0  Interpretation: Low risk drinking behavior  Single Item Drug Screening:  How often have you used an illegal drug (including marijuana) or a prescription medication for non-medical reasons in the past year? never    Single Item Drug Screen Score: 0  Interpretation: Negative screen for possible drug use disorder    No results found  Physical Exam:     /70   Pulse 84   Temp (!) 96 4 °F (35 8 °C)   Resp 20   Ht 5' 9\" (1 753 m)   Wt 76 2 kg (168 lb)   BMI 24 81 kg/m²     Physical Exam  Vitals and nursing note reviewed  Constitutional:       General: He is not in acute distress  Appearance: Normal appearance  He is well-developed  HENT:      Head: Normocephalic and atraumatic        Right Ear: Tympanic membrane, ear canal and external ear normal       Left Ear: Tympanic membrane, ear canal and external ear normal       Nose: Nose normal       Mouth/Throat:      " Mouth: Mucous membranes are moist       Pharynx: Oropharynx is clear  Eyes:      Extraocular Movements: Extraocular movements intact  Conjunctiva/sclera: Conjunctivae normal       Pupils: Pupils are equal, round, and reactive to light  Cardiovascular:      Rate and Rhythm: Normal rate and regular rhythm  Heart sounds: No murmur heard  Pulmonary:      Effort: Pulmonary effort is normal  No respiratory distress  Breath sounds: Normal breath sounds  Abdominal:      General: Abdomen is flat  Bowel sounds are normal       Palpations: Abdomen is soft  Tenderness: There is no abdominal tenderness  Musculoskeletal:         General: No swelling  Normal range of motion  Cervical back: Normal range of motion and neck supple  Skin:     General: Skin is warm and dry  Capillary Refill: Capillary refill takes less than 2 seconds  Neurological:      General: No focal deficit present  Mental Status: He is alert and oriented to person, place, and time  Psychiatric:         Mood and Affect: Mood normal          Behavior: Behavior normal          Thought Content:  Thought content normal          Judgment: Judgment normal           Wilber Ordonez DO

## 2023-05-10 LAB — COLOGUARD RESULT REPORTABLE: NORMAL

## 2023-06-06 ENCOUNTER — TELEPHONE (OUTPATIENT)
Dept: FAMILY MEDICINE CLINIC | Facility: CLINIC | Age: 58
End: 2023-06-06

## 2023-06-20 ENCOUNTER — TELEPHONE (OUTPATIENT)
Dept: FAMILY MEDICINE CLINIC | Facility: CLINIC | Age: 58
End: 2023-06-20

## 2023-06-20 LAB — COLOGUARD RESULT REPORTABLE: NEGATIVE

## 2023-06-20 NOTE — TELEPHONE ENCOUNTER
----- Message from Gila Rea DO sent at 6/20/2023  2:39 PM EDT -----  Please call the patient regarding his Cologuard being negative

## 2023-11-20 DIAGNOSIS — I15.9 SECONDARY HYPERTENSION: ICD-10-CM

## 2023-11-20 RX ORDER — AMLODIPINE BESYLATE 10 MG/1
TABLET ORAL
Qty: 30 TABLET | Refills: 0 | Status: SHIPPED | OUTPATIENT
Start: 2023-11-20

## 2023-11-20 RX ORDER — METOPROLOL SUCCINATE 25 MG/1
25 TABLET, EXTENDED RELEASE ORAL DAILY
Qty: 30 TABLET | Refills: 0 | Status: SHIPPED | OUTPATIENT
Start: 2023-11-20

## 2023-12-05 ENCOUNTER — RA CDI HCC (OUTPATIENT)
Dept: OTHER | Facility: HOSPITAL | Age: 58
End: 2023-12-05

## 2023-12-13 ENCOUNTER — OFFICE VISIT (OUTPATIENT)
Dept: FAMILY MEDICINE CLINIC | Facility: CLINIC | Age: 58
End: 2023-12-13
Payer: MEDICARE

## 2023-12-13 VITALS
HEIGHT: 69 IN | TEMPERATURE: 97.5 F | SYSTOLIC BLOOD PRESSURE: 126 MMHG | WEIGHT: 177 LBS | DIASTOLIC BLOOD PRESSURE: 76 MMHG | BODY MASS INDEX: 26.22 KG/M2 | HEART RATE: 84 BPM | RESPIRATION RATE: 16 BRPM

## 2023-12-13 DIAGNOSIS — I15.9 SECONDARY HYPERTENSION: Primary | ICD-10-CM

## 2023-12-13 DIAGNOSIS — E55.9 VITAMIN D DEFICIENCY: ICD-10-CM

## 2023-12-13 DIAGNOSIS — C67.9 MALIGNANT NEOPLASM OF URINARY BLADDER, UNSPECIFIED SITE (HCC): ICD-10-CM

## 2023-12-13 DIAGNOSIS — N18.4 CHRONIC KIDNEY DISEASE, STAGE 4 (SEVERE) (HCC): ICD-10-CM

## 2023-12-13 PROCEDURE — 99214 OFFICE O/P EST MOD 30 MIN: CPT | Performed by: FAMILY MEDICINE

## 2023-12-13 RX ORDER — AMLODIPINE BESYLATE 10 MG/1
10 TABLET ORAL DAILY
Qty: 30 TABLET | Refills: 5 | Status: SHIPPED | OUTPATIENT
Start: 2023-12-13

## 2023-12-13 RX ORDER — METOPROLOL SUCCINATE 25 MG/1
25 TABLET, EXTENDED RELEASE ORAL DAILY
Qty: 30 TABLET | Refills: 5 | Status: SHIPPED | OUTPATIENT
Start: 2023-12-13

## 2023-12-13 NOTE — PROGRESS NOTES
Assessment/Plan: Patient has a history of bladder cancer currently in remission    Chronic kidney disease stage IV under the care of nephrology    Hypertensive cardiovascular disease with a blood pressure controlled on the current regimen    Problem List Items Addressed This Visit    None  Visit Diagnoses     Vitamin D deficiency    -  Primary    Relevant Orders    Vitamin D 25 hydroxy    Secondary hypertension        Relevant Medications    amLODIPine (NORVASC) 10 mg tablet    metoprolol succinate (TOPROL-XL) 25 mg 24 hr tablet    Other Relevant Orders    Comprehensive metabolic panel    CBC and differential    Lipid Panel with Direct LDL reflex           Diagnoses and all orders for this visit:    Vitamin D deficiency  -     Vitamin D 25 hydroxy; Future    Secondary hypertension  -     amLODIPine (NORVASC) 10 mg tablet; Take 1 tablet (10 mg total) by mouth daily  -     metoprolol succinate (TOPROL-XL) 25 mg 24 hr tablet; Take 1 tablet (25 mg total) by mouth daily  -     Comprehensive metabolic panel; Future  -     CBC and differential; Future  -     Lipid Panel with Direct LDL reflex; Future        No problem-specific Assessment & Plan notes found for this encounter. PHQ-2/9 Depression Screening    Little interest or pleasure in doing things: 0 - not at all  Feeling down, depressed, or hopeless: 0 - not at all  PHQ-2 Score: 0  PHQ-2 Interpretation: Negative depression screen          Body mass index is 26.14 kg/m². BMI Counseling: Body mass index is 26.14 kg/m². The BMI     Subjective:      Patient ID: Cally Luna is a 59 y.o. male. 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 ,  does not have any pertinent problems on file. ,   has a past surgical history that includes Cystectomy w/ ureteroileal conduit (2009) and Incision and drainage of wound (N/A, 2/12/2023). ,  family history includes Aneurysm in his mother; Heart failure in his father. ,   reports that he has been smoking cigarettes. He started smoking about 42 years ago. He has never used smokeless tobacco. He reports that he does not currently use alcohol. He reports that he does not use drugs. ,  has No Known Allergies. .  Current Outpatient Medications   Medication Sig Dispense Refill   • amLODIPine (NORVASC) 10 mg tablet Take 1 tablet (10 mg total) by mouth daily 30 tablet 5   • metoprolol succinate (TOPROL-XL) 25 mg 24 hr tablet Take 1 tablet (25 mg total) by mouth daily 30 tablet 5   • alprostadil (EDEX) 20 MCG injection 20 mcg by Intracavitary route as needed for erectile dysfunction (Patient not taking: Reported on 2/16/2023) 5 vial 6   • Diclofenac Sodium (VOLTAREN) 1 % Apply 2 g topically 4 (four) times a day 100 g 1   • docusate sodium (COLACE) 100 mg capsule Take 1 capsule (100 mg total) by mouth 2 (two) times a day  0   • dronabinol (MARINOL) 5 MG capsule TAKE 1 CAPSULE BY MOUTH EVERY DAY (Patient taking differently: as needed) 90 capsule 0   • famotidine (PEPCID) 20 mg tablet TAKE 1 TABLET BY MOUTH TWICE A DAY (Patient not taking: No sig reported) 60 tablet 2   • Multiple Vitamin (DAILY VALUE MULTIVITAMIN) TABS Take 1 tablet by mouth daily     • nicotine (NICODERM CQ) 21 mg/24 hr TD 24 hr patch Place 1 patch on the skin over 24 hours every 24 hours 28 patch 0   • Potassium Citrate ER 15 MEQ (1620 MG) TBCR Take 1 tablet by mouth in the morning NOT TAKING     • predniSONE 10 mg tablet TAKE 1 TABLET BY MOUTH EVERY DAY (Patient taking differently: Take 5 mg by mouth daily) 30 tablet 0   • PROSTAGLANDIN PGE1 INJECTABLE 20 MCG/ML, STANDARD, IJ SOLN Inject 0.3mL to 1.0mL IC 20 minutes prior to intercourse.  (Patient not taking: Reported on 9/25/2020) 10 mL 0   • psyllium (METAMUCIL) packet Take 1 packet by mouth daily Do not start before February 16, 2023.  0   • sodium bicarbonate 650 mg tablet Take by mouth       No current facility-administered medications for this visit. Review of Systems   Constitutional:  Negative for chills and fever. HENT:  Negative for ear pain and sore throat. Eyes:  Negative for pain and visual disturbance. Respiratory:  Negative for cough and shortness of breath. Cardiovascular:  Negative for chest pain and palpitations. Gastrointestinal:  Negative for abdominal pain and vomiting. Genitourinary:  Negative for dysuria and hematuria. Musculoskeletal:  Negative for arthralgias and back pain. Skin:  Negative for color change and rash. Neurological:  Negative for seizures and syncope. All other systems reviewed and are negative. Objective:    /76   Pulse 84   Temp 97.5 °F (36.4 °C)   Resp 16   Ht 5' 9" (1.753 m)   Wt 80.3 kg (177 lb)   BMI 26.14 kg/m²   Body mass index is 26.14 kg/m². Physical Exam  Constitutional:       Appearance: Normal appearance. He is well-developed. HENT:      Head: Normocephalic and atraumatic. Right Ear: Tympanic membrane, ear canal and external ear normal.      Left Ear: Tympanic membrane, ear canal and external ear normal.      Nose: Nose normal.      Mouth/Throat:      Mouth: Mucous membranes are moist.      Pharynx: Oropharynx is clear. Eyes:      Extraocular Movements: Extraocular movements intact. Conjunctiva/sclera: Conjunctivae normal.      Pupils: Pupils are equal, round, and reactive to light. Cardiovascular:      Rate and Rhythm: Normal rate and regular rhythm. Pulses: Normal pulses. Heart sounds: Normal heart sounds. Pulmonary:      Effort: Pulmonary effort is normal.      Breath sounds: Normal breath sounds. Abdominal:      General: Abdomen is flat. Bowel sounds are normal.      Palpations: Abdomen is soft. Tenderness: There is no abdominal tenderness. Musculoskeletal:         General: Normal range of motion. Cervical back: Normal range of motion and neck supple. Skin:     General: Skin is warm and dry. Capillary Refill: Capillary refill takes less than 2 seconds. Neurological:      General: No focal deficit present. Mental Status: He is alert and oriented to person, place, and time. Psychiatric:         Mood and Affect: Mood normal.         Behavior: Behavior normal.         Thought Content:  Thought content normal.         Judgment: Judgment normal.

## 2024-01-23 ENCOUNTER — OFFICE VISIT (OUTPATIENT)
Dept: FAMILY MEDICINE CLINIC | Facility: CLINIC | Age: 59
End: 2024-01-23
Payer: COMMERCIAL

## 2024-01-23 VITALS
WEIGHT: 164 LBS | HEART RATE: 84 BPM | DIASTOLIC BLOOD PRESSURE: 66 MMHG | BODY MASS INDEX: 24.29 KG/M2 | HEIGHT: 69 IN | SYSTOLIC BLOOD PRESSURE: 126 MMHG | RESPIRATION RATE: 16 BRPM | TEMPERATURE: 97 F

## 2024-01-23 DIAGNOSIS — M54.31 SCIATICA OF RIGHT SIDE: Primary | ICD-10-CM

## 2024-01-23 DIAGNOSIS — M35.3 PMR (POLYMYALGIA RHEUMATICA) (HCC): ICD-10-CM

## 2024-01-23 DIAGNOSIS — N18.4 CHRONIC KIDNEY DISEASE, STAGE 4 (SEVERE) (HCC): ICD-10-CM

## 2024-01-23 DIAGNOSIS — Z85.51 HISTORY OF BLADDER CANCER: ICD-10-CM

## 2024-01-23 DIAGNOSIS — Z98.890 HISTORY OF LUMBAR LAMINECTOMY FOR SPINAL CORD DECOMPRESSION: ICD-10-CM

## 2024-01-23 PROCEDURE — 99214 OFFICE O/P EST MOD 30 MIN: CPT | Performed by: FAMILY MEDICINE

## 2024-01-23 RX ORDER — PREDNISONE 10 MG/1
5 TABLET ORAL DAILY
Qty: 90 TABLET | Refills: 1 | Status: SHIPPED | OUTPATIENT
Start: 2024-01-23

## 2024-01-23 RX ORDER — METHYLPREDNISOLONE 4 MG/1
TABLET ORAL
Qty: 21 EACH | Refills: 0 | Status: SHIPPED | OUTPATIENT
Start: 2024-01-23

## 2024-01-23 RX ORDER — BACLOFEN 10 MG/1
10 TABLET ORAL 3 TIMES DAILY
Qty: 30 TABLET | Refills: 1 | Status: SHIPPED | OUTPATIENT
Start: 2024-01-23

## 2024-01-23 NOTE — PROGRESS NOTES
Assessment/Plan: Right-sided sciatica in a patient with a history of L3-L4 disc decompression in 1987.  Patient also has a history of bladder cancer postresection.  Will provide a Medrol Dosepak baclofen 10 mg up to 3 times daily.  If the pain persist there is an order for an x-ray of the lumbar spine and the right hip    Chronic kidney disease stage IV    Polymyalgia rheumatica treated with prednisone 5 mg daily    Problem List Items Addressed This Visit     History of bladder cancer    PMR (polymyalgia rheumatica) ([facility])   Other Visit Diagnoses     Sciatica of right side    -  Primary    Relevant Medications    methylPREDNISolone 4 MG tablet therapy pack    predniSONE 10 mg tablet    baclofen 10 mg tablet    Other Relevant Orders    XR spine lumbar minimum 4 views non injury    XR hip/pelv 2-3 vws right if performed    History of lumbar laminectomy for spinal cord decompression        Relevant Medications    baclofen 10 mg tablet    Other Relevant Orders    XR spine lumbar minimum 4 views non injury    XR hip/pelv 2-3 vws right if performed    Chronic kidney disease, stage 4 (severe) ([facility])                 Diagnoses and all orders for this visit:    Sciatica of right side  -     XR spine lumbar minimum 4 views non injury; Future  -     XR hip/pelv 2-3 vws right if performed; Future  -     methylPREDNISolone 4 MG tablet therapy pack; Use as directed on package  -     predniSONE 10 mg tablet; Take 0.5 tablets (5 mg total) by mouth daily  -     baclofen 10 mg tablet; Take 1 tablet (10 mg total) by mouth 3 (three) times a day    History of lumbar laminectomy for spinal cord decompression  -     XR spine lumbar minimum 4 views non injury; Future  -     XR hip/pelv 2-3 vws right if performed; Future  -     baclofen 10 mg tablet; Take 1 tablet (10 mg total) by mouth 3 (three) times a day    History of bladder cancer    Chronic kidney disease, stage 4 (severe) ([facility])    PMR (polymyalgia rheumatica) ([facility])        No  problem-specific Assessment & Plan notes found for this encounter.      PHQ-2/9 Depression Screening            Body mass index is 24.22 kg/m².    BMI Counseling: Body mass index is 24.22 kg/m². The BMI   Subjective:      Patient ID: Thai Osman is a 58 y.o. male.    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 He started smoking about 43 years ago. He has never used smokeless tobacco. He reports that he does not currently use alcohol. He reports that he does not use drugs.,  has No Known Allergies..  Current Outpatient Medications   Medication Sig Dispense Refill   • baclofen 10 mg tablet Take 1 tablet (10 mg total) by mouth 3 (three) times a day 30 tablet 1   • methylPREDNISolone 4 MG tablet therapy pack Use as directed on package 21 each 0   • predniSONE 10 mg tablet Take 0.5 tablets (5 mg total) by mouth daily 90 tablet 1   • alprostadil (EDEX) 20 MCG injection 20 mcg by Intracavitary route as needed for erectile dysfunction (Patient not taking: Reported on 2/16/2023) 5 vial 6   • amLODIPine (NORVASC) 10 mg tablet Take 1 tablet (10 mg total) by mouth daily 30 tablet 5   • Diclofenac Sodium (VOLTAREN) 1 % Apply 2 g topically 4 (four) times a day 100 g 1   • docusate sodium (COLACE) 100 mg capsule Take 1 capsule (100 mg total) by mouth 2  "(two) times a day  0   • dronabinol (MARINOL) 5 MG capsule TAKE 1 CAPSULE BY MOUTH EVERY DAY (Patient taking differently: as needed) 90 capsule 0   • famotidine (PEPCID) 20 mg tablet TAKE 1 TABLET BY MOUTH TWICE A DAY (Patient not taking: No sig reported) 60 tablet 2   • metoprolol succinate (TOPROL-XL) 25 mg 24 hr tablet Take 1 tablet (25 mg total) by mouth daily 30 tablet 5   • Multiple Vitamin (DAILY VALUE MULTIVITAMIN) TABS Take 1 tablet by mouth daily     • nicotine (NICODERM CQ) 21 mg/24 hr TD 24 hr patch Place 1 patch on the skin over 24 hours every 24 hours 28 patch 0   • Potassium Citrate ER 15 MEQ (1620 MG) TBCR Take 1 tablet by mouth in the morning NOT TAKING     • PROSTAGLANDIN PGE1 INJECTABLE 20 MCG/ML, STANDARD, IJ SOLN Inject 0.3mL to 1.0mL IC 20 minutes prior to intercourse. (Patient not taking: Reported on 9/25/2020) 10 mL 0   • psyllium (METAMUCIL) packet Take 1 packet by mouth daily Do not start before February 16, 2023.  0   • sodium bicarbonate 650 mg tablet Take by mouth       No current facility-administered medications for this visit.       Review of Systems   Constitutional:  Negative for chills and fever.   HENT:  Negative for ear pain and sore throat.    Eyes:  Negative for pain and visual disturbance.   Respiratory:  Negative for cough and shortness of breath.    Cardiovascular:  Negative for chest pain and palpitations.   Gastrointestinal:  Negative for abdominal pain and vomiting.   Genitourinary:  Negative for dysuria and hematuria.   Musculoskeletal:  Positive for back pain. Negative for arthralgias.        Back pain with radiation into the right leg down to the calf   Skin:  Negative for color change and rash.   Neurological:  Negative for seizures and syncope.   All other systems reviewed and are negative.        Objective:    /66   Pulse 84   Temp (!) 97 °F (36.1 °C)   Resp 16   Ht 5' 9\" (1.753 m)   Wt 74.4 kg (164 lb)   BMI 24.22 kg/m²   Body mass index is 24.22 " kg/m².     Physical Exam  Constitutional:       Appearance: Normal appearance. He is well-developed.   HENT:      Head: Normocephalic and atraumatic.      Right Ear: Tympanic membrane, ear canal and external ear normal.      Left Ear: Tympanic membrane, ear canal and external ear normal.      Nose: Nose normal.      Mouth/Throat:      Mouth: Mucous membranes are moist.      Pharynx: Oropharynx is clear.   Eyes:      Extraocular Movements: Extraocular movements intact.      Conjunctiva/sclera: Conjunctivae normal.      Pupils: Pupils are equal, round, and reactive to light.   Cardiovascular:      Rate and Rhythm: Normal rate and regular rhythm.      Pulses: Normal pulses.      Heart sounds: Normal heart sounds.   Pulmonary:      Effort: Pulmonary effort is normal.      Breath sounds: Normal breath sounds.   Abdominal:      General: Abdomen is flat. Bowel sounds are normal.      Palpations: Abdomen is soft.      Tenderness: There is no abdominal tenderness.   Musculoskeletal:         General: Normal range of motion.      Cervical back: Normal range of motion and neck supple.      Comments: Negative leg raising positive SI tenderness on the right   Skin:     General: Skin is warm and dry.      Capillary Refill: Capillary refill takes less than 2 seconds.   Neurological:      General: No focal deficit present.      Mental Status: He is alert and oriented to person, place, and time.   Psychiatric:         Mood and Affect: Mood normal.         Behavior: Behavior normal.         Thought Content: Thought content normal.         Judgment: Judgment normal.

## 2024-05-02 DIAGNOSIS — M35.3 PMR (POLYMYALGIA RHEUMATICA) (HCC): Primary | ICD-10-CM

## 2024-05-02 RX ORDER — PREDNISONE 5 MG/1
5 TABLET ORAL DAILY
Qty: 90 TABLET | Refills: 1 | Status: SHIPPED | OUTPATIENT
Start: 2024-05-02

## 2024-06-02 ENCOUNTER — RA CDI HCC (OUTPATIENT)
Dept: OTHER | Facility: HOSPITAL | Age: 59
End: 2024-06-02

## 2024-07-02 ENCOUNTER — OFFICE VISIT (OUTPATIENT)
Dept: FAMILY MEDICINE CLINIC | Facility: CLINIC | Age: 59
End: 2024-07-02
Payer: COMMERCIAL

## 2024-07-02 ENCOUNTER — TELEPHONE (OUTPATIENT)
Age: 59
End: 2024-07-02

## 2024-07-02 VITALS
RESPIRATION RATE: 20 BRPM | BODY MASS INDEX: 22.36 KG/M2 | WEIGHT: 151 LBS | TEMPERATURE: 96.8 F | DIASTOLIC BLOOD PRESSURE: 84 MMHG | HEART RATE: 84 BPM | HEIGHT: 69 IN | SYSTOLIC BLOOD PRESSURE: 130 MMHG

## 2024-07-02 DIAGNOSIS — H66.93 OTITIS OF BOTH EARS: Primary | ICD-10-CM

## 2024-07-02 DIAGNOSIS — M35.3 PMR (POLYMYALGIA RHEUMATICA) (HCC): ICD-10-CM

## 2024-07-02 DIAGNOSIS — N18.4 CHRONIC KIDNEY DISEASE, STAGE 4 (SEVERE) (HCC): ICD-10-CM

## 2024-07-02 DIAGNOSIS — Z85.51 HISTORY OF BLADDER CANCER: ICD-10-CM

## 2024-07-02 DIAGNOSIS — I15.9 SECONDARY HYPERTENSION: ICD-10-CM

## 2024-07-02 PROCEDURE — G2211 COMPLEX E/M VISIT ADD ON: HCPCS | Performed by: FAMILY MEDICINE

## 2024-07-02 PROCEDURE — 99214 OFFICE O/P EST MOD 30 MIN: CPT | Performed by: FAMILY MEDICINE

## 2024-07-02 RX ORDER — AMOXICILLIN 500 MG/1
500 CAPSULE ORAL EVERY 8 HOURS SCHEDULED
Qty: 30 CAPSULE | Refills: 0 | Status: SHIPPED | OUTPATIENT
Start: 2024-07-02 | End: 2024-07-12

## 2024-07-02 RX ORDER — METHYLPREDNISOLONE 4 MG/1
TABLET ORAL
Qty: 21 EACH | Refills: 0 | Status: SHIPPED | OUTPATIENT
Start: 2024-07-02

## 2024-07-02 NOTE — TELEPHONE ENCOUNTER
Wife called stating pt c/o ear  pain, feeling run down and nauseas.  Requesting an appointment.  Nothing available so a warm transfer to office clerical was made for further assistance.

## 2024-07-02 NOTE — PROGRESS NOTES
Assessment/Plan:    Problem List Items Addressed This Visit    None       There are no diagnoses linked to this encounter.    No problem-specific Assessment & Plan notes found for this encounter.      PHQ-2/9 Depression Screening            Body mass index is 22.3 kg/m².    BMI Counseling: Body mass index is 22.3 kg/m². The BMI {[facility] BMI Counselin}    Subjective:      Patient ID: Thai Osman is a 58 y.o. male.    Earache   Pertinent negatives include no abdominal pain, coughing, rash, sore throat or vomiting.   Nausea  Associated symptoms include nausea. Pertinent negatives include no abdominal pain, arthralgias, chest pain, chills, coughing, fever, rash, sore throat or vomiting.       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 He started smoking about 43 years ago. He has never used smokeless tobacco. He reports that he does not currently use alcohol. He reports that he does not use drugs.,  has No Known Allergies..  Current Outpatient Medications   Medication Sig Dispense Refill   • alprostadil (EDEX) 20 MCG injection 20 mcg by Intracavitary route as needed for erectile dysfunction (Patient not taking: Reported on 2023) 5 vial 6   • amLODIPine (NORVASC) 10 mg tablet Take 1 tablet (10 mg total) by mouth daily 30 tablet 5   • baclofen 10 mg tablet Take 1 tablet (10 mg total) by mouth 3 (three) times a day 30 tablet 1   • Diclofenac  Sodium (VOLTAREN) 1 % Apply 2 g topically 4 (four) times a day 100 g 1   • docusate sodium (COLACE) 100 mg capsule Take 1 capsule (100 mg total) by mouth 2 (two) times a day  0   • dronabinol (MARINOL) 5 MG capsule TAKE 1 CAPSULE BY MOUTH EVERY DAY (Patient taking differently: as needed) 90 capsule 0   • famotidine (PEPCID) 20 mg tablet TAKE 1 TABLET BY MOUTH TWICE A DAY (Patient not taking: No sig reported) 60 tablet 2   • metoprolol succinate (TOPROL-XL) 25 mg 24 hr tablet Take 1 tablet (25 mg total) by mouth daily 30 tablet 5   • Multiple Vitamin (DAILY VALUE MULTIVITAMIN) TABS Take 1 tablet by mouth daily     • nicotine (NICODERM CQ) 21 mg/24 hr TD 24 hr patch Place 1 patch on the skin over 24 hours every 24 hours 28 patch 0   • Potassium Citrate ER 15 MEQ (1620 MG) TBCR Take 1 tablet by mouth in the morning NOT TAKING     • predniSONE 5 mg tablet Take 1 tablet (5 mg total) by mouth daily 90 tablet 1   • PROSTAGLANDIN PGE1 INJECTABLE 20 MCG/ML, STANDARD, IJ SOLN Inject 0.3mL to 1.0mL IC 20 minutes prior to intercourse. (Patient not taking: Reported on 9/25/2020) 10 mL 0   • psyllium (METAMUCIL) packet Take 1 packet by mouth daily Do not start before February 16, 2023.  0   • sodium bicarbonate 650 mg tablet Take by mouth       No current facility-administered medications for this visit.       Review of Systems   Constitutional:  Negative for chills and fever.   HENT:  Positive for ear pain. Negative for sore throat.    Eyes:  Negative for pain and visual disturbance.   Respiratory:  Negative for cough and shortness of breath.    Cardiovascular:  Negative for chest pain and palpitations.   Gastrointestinal:  Positive for nausea. Negative for abdominal pain and vomiting.   Genitourinary:  Negative for dysuria and hematuria.   Musculoskeletal:  Negative for arthralgias and back pain.   Skin:  Negative for color change and rash.   Neurological:  Negative for seizures and syncope.   All other systems reviewed and are  "negative.        Objective:    /84   Pulse 84   Temp (!) 96.8 °F (36 °C)   Resp 20   Ht 5' 9\" (1.753 m)   Wt 68.5 kg (151 lb)   BMI 22.30 kg/m²   Body mass index is 22.3 kg/m².     Physical Exam  Constitutional:       Appearance: Normal appearance. He is well-developed.   HENT:      Head: Normocephalic and atraumatic.      Right Ear: Tympanic membrane, ear canal and external ear normal.      Left Ear: Tympanic membrane, ear canal and external ear normal.      Nose: Nose normal.      Mouth/Throat:      Mouth: Mucous membranes are moist.      Pharynx: Oropharynx is clear.   Eyes:      Extraocular Movements: Extraocular movements intact.      Conjunctiva/sclera: Conjunctivae normal.      Pupils: Pupils are equal, round, and reactive to light.   Cardiovascular:      Rate and Rhythm: Normal rate and regular rhythm.      Pulses: Normal pulses.      Heart sounds: Normal heart sounds.   Pulmonary:      Effort: Pulmonary effort is normal.      Breath sounds: Normal breath sounds.   Abdominal:      General: Abdomen is flat. Bowel sounds are normal.      Palpations: Abdomen is soft.      Tenderness: There is no abdominal tenderness.   Musculoskeletal:         General: Normal range of motion.      Cervical back: Normal range of motion and neck supple.   Skin:     General: Skin is warm and dry.      Capillary Refill: Capillary refill takes less than 2 seconds.   Neurological:      General: No focal deficit present.      Mental Status: He is alert and oriented to person, place, and time.   Psychiatric:         Mood and Affect: Mood normal.         Behavior: Behavior normal.         Thought Content: Thought content normal.         Judgment: Judgment normal.           "

## 2024-07-02 NOTE — PROGRESS NOTES
Assessment/Plan: Bilateral otitis media we will provide a Medrol Dosepak along with amoxicillin 500 mg 3 times daily    Secondary hypertension with a blood pressure controlled on the current regimen    History of bladder cancer in remission    Chronic kidney disease stage IV    Polymyalgia rheumatica    Problem List Items Addressed This Visit     History of bladder cancer    PMR (polymyalgia rheumatica) (HCC)   Other Visit Diagnoses     Otitis of both ears    -  Primary    Relevant Medications    methylPREDNISolone 4 MG tablet therapy pack    amoxicillin (AMOXIL) 500 mg capsule    Chronic kidney disease, stage 4 (severe) (HCC)        Secondary hypertension                 Diagnoses and all orders for this visit:    Otitis of both ears  -     methylPREDNISolone 4 MG tablet therapy pack; Use as directed on package  -     amoxicillin (AMOXIL) 500 mg capsule; Take 1 capsule (500 mg total) by mouth every 8 (eight) hours for 10 days    PMR (polymyalgia rheumatica) ([facility])    History of bladder cancer    Chronic kidney disease, stage 4 (severe) ([facility])    Secondary hypertension        No problem-specific Assessment & Plan notes found for this encounter.      PHQ-2/9 Depression Screening            Body mass index is 22.3 kg/m².    BMI Counseling: Body mass index is 22.3 kg/m². The BMI     Subjective:      Patient ID: Thai Osman is a 58 y.o. male.    Patient presents with chief complaint of bilateral earaches for approximately a week        The following portions of the patient's history were reviewed and updated as appropriate:   He has a past medical history of 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, Calculus of kidney, Hyperlipidemia, Hypertension, Malignant neoplasm of ureteric orifice (HCC), and Other male erectile dysfunction.,  does not have any pertinent problems on file.,   has a past surgical history that includes Cystectomy w/ ureteroileal conduit (2009) and Incision and drainage of wound (N/A,  2/12/2023).,  family history includes Aneurysm in his mother; Heart failure in his father.,   reports that he has been smoking cigarettes. He started smoking about 43 years ago. He has never used smokeless tobacco. He reports that he does not currently use alcohol. He reports that he does not use drugs.,  has No Known Allergies..  Current Outpatient Medications   Medication Sig Dispense Refill   • amoxicillin (AMOXIL) 500 mg capsule Take 1 capsule (500 mg total) by mouth every 8 (eight) hours for 10 days 30 capsule 0   • methylPREDNISolone 4 MG tablet therapy pack Use as directed on package 21 each 0   • alprostadil (EDEX) 20 MCG injection 20 mcg by Intracavitary route as needed for erectile dysfunction (Patient not taking: Reported on 2/16/2023) 5 vial 6   • amLODIPine (NORVASC) 10 mg tablet Take 1 tablet (10 mg total) by mouth daily 30 tablet 5   • baclofen 10 mg tablet Take 1 tablet (10 mg total) by mouth 3 (three) times a day 30 tablet 1   • Diclofenac Sodium (VOLTAREN) 1 % Apply 2 g topically 4 (four) times a day 100 g 1   • docusate sodium (COLACE) 100 mg capsule Take 1 capsule (100 mg total) by mouth 2 (two) times a day  0   • dronabinol (MARINOL) 5 MG capsule TAKE 1 CAPSULE BY MOUTH EVERY DAY (Patient taking differently: as needed) 90 capsule 0   • famotidine (PEPCID) 20 mg tablet TAKE 1 TABLET BY MOUTH TWICE A DAY (Patient not taking: No sig reported) 60 tablet 2   • metoprolol succinate (TOPROL-XL) 25 mg 24 hr tablet Take 1 tablet (25 mg total) by mouth daily 30 tablet 5   • Multiple Vitamin (DAILY VALUE MULTIVITAMIN) TABS Take 1 tablet by mouth daily     • nicotine (NICODERM CQ) 21 mg/24 hr TD 24 hr patch Place 1 patch on the skin over 24 hours every 24 hours 28 patch 0   • Potassium Citrate ER 15 MEQ (1620 MG) TBCR Take 1 tablet by mouth in the morning NOT TAKING     • PROSTAGLANDIN PGE1 INJECTABLE 20 MCG/ML, STANDARD, IJ SOLN Inject 0.3mL to 1.0mL IC 20 minutes prior to intercourse. (Patient not taking:  "Reported on 9/25/2020) 10 mL 0   • psyllium (METAMUCIL) packet Take 1 packet by mouth daily Do not start before February 16, 2023.  0   • sodium bicarbonate 650 mg tablet Take by mouth       No current facility-administered medications for this visit.       Review of Systems   Constitutional:  Negative for chills and fever.   HENT:  Positive for ear pain and hearing loss. Negative for sore throat.    Eyes:  Negative for pain and visual disturbance.   Respiratory:  Negative for cough and shortness of breath.    Cardiovascular:  Negative for chest pain and palpitations.   Gastrointestinal:  Negative for abdominal pain and vomiting.   Genitourinary:  Negative for dysuria and hematuria.   Musculoskeletal:  Negative for arthralgias and back pain.   Skin:  Negative for color change and rash.   Neurological:  Positive for dizziness. Negative for seizures and syncope.   All other systems reviewed and are negative.        Objective:    /84   Pulse 84   Temp (!) 96.8 °F (36 °C)   Resp 20   Ht 5' 9\" (1.753 m)   Wt 68.5 kg (151 lb)   BMI 22.30 kg/m²   Body mass index is 22.3 kg/m².     Physical Exam  Constitutional:       Appearance: Normal appearance. He is well-developed.   HENT:      Head: Normocephalic and atraumatic.      Right Ear: Ear canal and external ear normal. Tympanic membrane is erythematous and bulging.      Left Ear: Ear canal and external ear normal. Tympanic membrane is erythematous and bulging.      Nose: Nose normal.      Mouth/Throat:      Mouth: Mucous membranes are moist.      Pharynx: Oropharynx is clear.   Eyes:      Extraocular Movements: Extraocular movements intact.      Conjunctiva/sclera: Conjunctivae normal.      Pupils: Pupils are equal, round, and reactive to light.   Cardiovascular:      Rate and Rhythm: Normal rate and regular rhythm.      Pulses: Normal pulses.      Heart sounds: Normal heart sounds.   Pulmonary:      Effort: Pulmonary effort is normal.      Breath sounds: Normal " breath sounds.   Abdominal:      General: Abdomen is flat. Bowel sounds are normal.      Palpations: Abdomen is soft.      Tenderness: There is no abdominal tenderness.   Musculoskeletal:         General: Normal range of motion.      Cervical back: Normal range of motion and neck supple.   Skin:     General: Skin is warm and dry.      Capillary Refill: Capillary refill takes less than 2 seconds.   Neurological:      General: No focal deficit present.      Mental Status: He is alert and oriented to person, place, and time.   Psychiatric:         Mood and Affect: Mood normal.         Behavior: Behavior normal.         Thought Content: Thought content normal.         Judgment: Judgment normal.

## 2024-07-12 ENCOUNTER — TELEPHONE (OUTPATIENT)
Age: 59
End: 2024-07-12

## 2024-07-12 DIAGNOSIS — F13.120: ICD-10-CM

## 2024-07-12 DIAGNOSIS — Z77.018 HEAVY METAL EXPOSURE: Primary | ICD-10-CM

## 2024-07-12 DIAGNOSIS — F19.90 DRUG USE: ICD-10-CM

## 2024-07-12 NOTE — TELEPHONE ENCOUNTER
Patient states he is worried about poisoning. He is wondering if an arsenic and a phenethanol level can be added to his labs to check. Patient requests a call back to advise if possible.
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
## 2024-07-16 ENCOUNTER — APPOINTMENT (OUTPATIENT)
Dept: LAB | Facility: HOSPITAL | Age: 59
End: 2024-07-16
Payer: COMMERCIAL

## 2024-07-16 DIAGNOSIS — I15.9 SECONDARY HYPERTENSION: ICD-10-CM

## 2024-07-16 DIAGNOSIS — F19.90 DRUG USE: ICD-10-CM

## 2024-07-16 DIAGNOSIS — F13.120: ICD-10-CM

## 2024-07-16 DIAGNOSIS — E55.9 VITAMIN D DEFICIENCY: ICD-10-CM

## 2024-07-16 LAB
25(OH)D3 SERPL-MCNC: 29 NG/ML (ref 30–100)
ALBUMIN SERPL BCG-MCNC: 3.4 G/DL (ref 3.5–5)
ALP SERPL-CCNC: 75 U/L (ref 34–104)
ALT SERPL W P-5'-P-CCNC: 11 U/L (ref 7–52)
ANION GAP SERPL CALCULATED.3IONS-SCNC: 10 MMOL/L (ref 4–13)
AST SERPL W P-5'-P-CCNC: 9 U/L (ref 13–39)
BASOPHILS # BLD AUTO: 0.06 THOUSANDS/ÂΜL (ref 0–0.1)
BASOPHILS NFR BLD AUTO: 0 % (ref 0–1)
BILIRUB SERPL-MCNC: 0.27 MG/DL (ref 0.2–1)
BUN SERPL-MCNC: 62 MG/DL (ref 5–25)
CALCIUM ALBUM COR SERPL-MCNC: 9.4 MG/DL (ref 8.3–10.1)
CALCIUM SERPL-MCNC: 8.9 MG/DL (ref 8.4–10.2)
CHLORIDE SERPL-SCNC: 111 MMOL/L (ref 96–108)
CHOLEST SERPL-MCNC: 118 MG/DL
CO2 SERPL-SCNC: 16 MMOL/L (ref 21–32)
CREAT SERPL-MCNC: 4.15 MG/DL (ref 0.6–1.3)
EOSINOPHIL # BLD AUTO: 0.38 THOUSAND/ÂΜL (ref 0–0.61)
EOSINOPHIL NFR BLD AUTO: 3 % (ref 0–6)
ERYTHROCYTE [DISTWIDTH] IN BLOOD BY AUTOMATED COUNT: 16 % (ref 11.6–15.1)
GFR SERPL CREATININE-BSD FRML MDRD: 14 ML/MIN/1.73SQ M
GLUCOSE P FAST SERPL-MCNC: 93 MG/DL (ref 65–99)
HCT VFR BLD AUTO: 32 % (ref 36.5–49.3)
HDLC SERPL-MCNC: 37 MG/DL
HGB BLD-MCNC: 10.1 G/DL (ref 12–17)
IMM GRANULOCYTES # BLD AUTO: 0.1 THOUSAND/UL (ref 0–0.2)
IMM GRANULOCYTES NFR BLD AUTO: 1 % (ref 0–2)
LDLC SERPL CALC-MCNC: 65 MG/DL (ref 0–100)
LYMPHOCYTES # BLD AUTO: 2.16 THOUSANDS/ÂΜL (ref 0.6–4.47)
LYMPHOCYTES NFR BLD AUTO: 16 % (ref 14–44)
MCH RBC QN AUTO: 29.2 PG (ref 26.8–34.3)
MCHC RBC AUTO-ENTMCNC: 31.6 G/DL (ref 31.4–37.4)
MCV RBC AUTO: 93 FL (ref 82–98)
MONOCYTES # BLD AUTO: 1.04 THOUSAND/ÂΜL (ref 0.17–1.22)
MONOCYTES NFR BLD AUTO: 8 % (ref 4–12)
NEUTROPHILS # BLD AUTO: 10.14 THOUSANDS/ÂΜL (ref 1.85–7.62)
NEUTS SEG NFR BLD AUTO: 72 % (ref 43–75)
NRBC BLD AUTO-RTO: 0 /100 WBCS
PLATELET # BLD AUTO: 458 THOUSANDS/UL (ref 149–390)
PMV BLD AUTO: 9.3 FL (ref 8.9–12.7)
POTASSIUM SERPL-SCNC: 3.3 MMOL/L (ref 3.5–5.3)
PROT SERPL-MCNC: 6.5 G/DL (ref 6.4–8.4)
RBC # BLD AUTO: 3.46 MILLION/UL (ref 3.88–5.62)
SODIUM SERPL-SCNC: 137 MMOL/L (ref 135–147)
TRIGL SERPL-MCNC: 80 MG/DL
WBC # BLD AUTO: 13.88 THOUSAND/UL (ref 4.31–10.16)

## 2024-07-16 PROCEDURE — 80061 LIPID PANEL: CPT

## 2024-07-16 PROCEDURE — 82306 VITAMIN D 25 HYDROXY: CPT

## 2024-07-16 PROCEDURE — 36415 COLL VENOUS BLD VENIPUNCTURE: CPT

## 2024-07-16 PROCEDURE — 80053 COMPREHEN METABOLIC PANEL: CPT

## 2024-07-16 PROCEDURE — 85025 COMPLETE CBC W/AUTO DIFF WBC: CPT

## 2024-07-17 ENCOUNTER — TELEPHONE (OUTPATIENT)
Dept: FAMILY MEDICINE CLINIC | Facility: CLINIC | Age: 59
End: 2024-07-17

## 2024-07-17 DIAGNOSIS — E55.9 VITAMIN D DEFICIENCY: Primary | ICD-10-CM

## 2024-07-17 RX ORDER — ERGOCALCIFEROL 1.25 MG/1
50000 CAPSULE ORAL WEEKLY
Qty: 12 CAPSULE | Refills: 0 | Status: SHIPPED | OUTPATIENT
Start: 2024-07-17

## 2024-07-17 NOTE — TELEPHONE ENCOUNTER
----- Message from Alvaro Nayak DO sent at 7/17/2024 11:13 AM EDT -----  Please call the patient regarding his abnormal result.  Vitamin D deficient take vitamin D 50,000 units weekly for 3 months and then over-the-counter 1000 units daily recheck vitamin D level in 4 to 6 months

## 2024-07-17 NOTE — TELEPHONE ENCOUNTER
Called patient left voicemail regarding results below.  If any questions or concerns to call the office.

## 2024-07-26 ENCOUNTER — RA CDI HCC (OUTPATIENT)
Dept: OTHER | Facility: HOSPITAL | Age: 59
End: 2024-07-26

## 2024-09-13 ENCOUNTER — TELEPHONE (OUTPATIENT)
Age: 59
End: 2024-09-13

## 2024-09-13 DIAGNOSIS — M35.3 PMR (POLYMYALGIA RHEUMATICA) (HCC): Primary | ICD-10-CM

## 2024-09-13 RX ORDER — PREDNISONE 5 MG/1
5 TABLET ORAL DAILY
Qty: 90 TABLET | Refills: 0 | Status: SHIPPED | OUTPATIENT
Start: 2024-09-13

## 2024-09-13 NOTE — TELEPHONE ENCOUNTER
[facility]  he is completely out and has a wellness scheduled for 9/25

## 2024-09-13 NOTE — TELEPHONE ENCOUNTER
Please review chart. Prednisone 5mg D/C on 07/02/2024 and prescribed a medrol dose pack at that time.

## 2024-09-25 ENCOUNTER — OFFICE VISIT (OUTPATIENT)
Dept: FAMILY MEDICINE CLINIC | Facility: CLINIC | Age: 59
End: 2024-09-25
Payer: COMMERCIAL

## 2024-09-25 VITALS
BODY MASS INDEX: 24.14 KG/M2 | DIASTOLIC BLOOD PRESSURE: 76 MMHG | WEIGHT: 163 LBS | HEART RATE: 84 BPM | TEMPERATURE: 96.8 F | RESPIRATION RATE: 20 BRPM | SYSTOLIC BLOOD PRESSURE: 128 MMHG | HEIGHT: 69 IN

## 2024-09-25 DIAGNOSIS — Z13.6 SCREENING FOR CARDIOVASCULAR CONDITION: ICD-10-CM

## 2024-09-25 DIAGNOSIS — I15.2 HYPERTENSION DUE TO ENDOCRINE DISORDER: ICD-10-CM

## 2024-09-25 DIAGNOSIS — H66.92 OTITIS OF LEFT EAR: ICD-10-CM

## 2024-09-25 DIAGNOSIS — Z00.00 MEDICARE ANNUAL WELLNESS VISIT, SUBSEQUENT: Primary | ICD-10-CM

## 2024-09-25 DIAGNOSIS — M35.3 PMR (POLYMYALGIA RHEUMATICA) (HCC): ICD-10-CM

## 2024-09-25 DIAGNOSIS — Z85.51 HISTORY OF BLADDER CANCER: ICD-10-CM

## 2024-09-25 DIAGNOSIS — N18.4 CHRONIC KIDNEY DISEASE, STAGE 4 (SEVERE) (HCC): ICD-10-CM

## 2024-09-25 PROBLEM — N17.9 AKI (ACUTE KIDNEY INJURY) (HCC): Status: RESOLVED | Noted: 2023-02-12 | Resolved: 2024-09-25

## 2024-09-25 PROCEDURE — G0439 PPPS, SUBSEQ VISIT: HCPCS | Performed by: FAMILY MEDICINE

## 2024-09-25 PROCEDURE — 99214 OFFICE O/P EST MOD 30 MIN: CPT | Performed by: FAMILY MEDICINE

## 2024-09-25 RX ORDER — METHYLPREDNISOLONE 4 MG
TABLET, DOSE PACK ORAL
Qty: 21 EACH | Refills: 0 | Status: CANCELLED | OUTPATIENT
Start: 2024-09-25

## 2024-09-25 RX ORDER — CEFDINIR 300 MG/1
300 CAPSULE ORAL EVERY 12 HOURS SCHEDULED
Qty: 14 CAPSULE | Refills: 0 | Status: SHIPPED | OUTPATIENT
Start: 2024-09-25 | End: 2024-10-02

## 2024-09-25 NOTE — PROGRESS NOTES
Ambulatory Visit  Name: Thai Osman      : 1965      MRN: [id number]  Encounter Provider: Alvaro Nayak DO  Encounter Date: 2024   Encounter department: [facility]    Assessment & Plan  Medicare annual wellness visit, subsequent         Otitis of left ear  We will provide Omnicef 300 twice daily for 7 days       Hypertension due to endocrine disorder  With a blood pressure controlled on current regimen       History of bladder cancer  In remission       Chronic kidney disease, stage 4 (severe) ([facility])  Lab Results   Component Value Date    EGFR 14 2024    EGFR 25 02/15/2023    EGFR 25 2023    CREATININE 4.15 (H) 2024    CREATININE 2.60 (H) 02/15/2023    CREATININE 2.62 (H) 2023   Followed by nephrology G         PMR (polymyalgia rheumatica) ([facility])  Followed by rheumatology on prednisone 5 mg daily         Depression Screening and Follow-up Plan: Patient was screened for depression during today's encounter. They screened negative with a PHQ-2 score of 0.    Tobacco Cessation Counseling: Tobacco cessation counseling was provided. The patient is sincerely urged to quit consumption of tobacco. He is not ready to quit tobacco. Medication options and side effects of medication discussed. Patient refused medication.     Preventive health issues were discussed with patient, and age appropriate screening tests were ordered as noted in patient's After Visit Summary. Personalized health advice and appropriate referrals for health education or preventive services given if needed, as noted in patient's After Visit Summary.    History of Present Illness     Earache   Pertinent negatives include no abdominal pain, coughing, rash, sore throat or vomiting.      Patient Care Team:  Alvaro Nayak DO as PCP - General (Family Medicine)  Tsering Lilly MD (General Surgery)    Review of Systems   Constitutional:  Negative for chills and fever.   HENT:  Positive  for ear pain. Negative for sore throat.    Eyes:  Negative for pain and visual disturbance.   Respiratory:  Negative for cough and shortness of breath.    Cardiovascular:  Negative for chest pain and palpitations.   Gastrointestinal:  Negative for abdominal pain and vomiting.   Genitourinary:  Negative for dysuria and hematuria.   Musculoskeletal:  Negative for arthralgias and back pain.   Skin:  Negative for color change and rash.   Neurological:  Negative for seizures and syncope.   All other systems reviewed and are negative.  HEENT shows otitis media with bulging and redness of the left tympanic membrane otherwise unremarkable    Cardiopulmonary the heart is regular rate and rhythm the lungs are clear to auscultation percussion bilaterally    Abdomen is soft nontender bowel sounds are active    Neurologically intact      Medical History Reviewed by provider this encounter:       Annual Wellness Visit Questionnaire       Health Risk Assessment:   Patient rates overall health as good. Patient feels that their physical health rating is same. Eyesight was rated as same. Hearing was rated as same. Patient feels that their emotional and mental health rating is same. Patients states they are never, rarely angry. Patient states they are never, rarely unusually tired/fatigued. Pain experienced in the last 7 days has been none. Patient states that he has experienced no weight loss or gain in last 6 months.     Depression Screening:   PHQ-2 Score: 0      Fall Risk Screening:   In the past year, patient has experienced: no history of falling in past year      Home Safety:  Patient does not have trouble with stairs inside or outside of their home.     Nutrition:   Current diet is Regular.     Medications:   Patient is currently taking over-the-counter supplements. OTC medications include: see medication list. Patient is able to manage medications.     Activities of Daily Living (ADLs)/Instrumental Activities of Daily Living  (IADLs):   Walk and transfer into and out of bed and chair?: Yes  Dress and groom yourself?: Yes    Bathe or shower yourself?: Yes    Feed yourself? Yes  Do your laundry/housekeeping?: Yes  Manage your money, pay your bills and track your expenses?: Yes  Make your own meals?: Yes    Do your own shopping?: Yes    Previous Hospitalizations:   Any hospitalizations or ED visits within the last 12 months?: No      Advance Care Planning:   Living will: No    Durable POA for healthcare: No    Advanced directive: No    ACP document given: Yes      PREVENTIVE SCREENINGS      Cardiovascular Screening:    General: Screening Current      Diabetes Screening:     General: Screening Current      Colorectal Cancer Screening:     General: Screening Current      Abdominal Aortic Aneurysm (AAA) Screening:    Risk factors include: tobacco use        Lung Cancer Screening:     General: Screening Not Indicated      Hepatitis C Screening:    General: Screening Current    Screening, Brief Intervention, and Referral to Treatment (SBIRT)    Screening  Typical number of drinks in a day: 0  Typical number of drinks in a week: 0  Interpretation: Low risk drinking behavior.    Single Item Drug Screening:  How often have you used an illegal drug (including marijuana) or a prescription medication for non-medical reasons in the past year? never    Single Item Drug Screen Score: 0  Interpretation: Negative screen for possible drug use disorder    Social Determinants of Health     Financial Resource Strain: Low Risk  (4/24/2023)    Overall Financial Resource Strain (CARDIA)     Difficulty of Paying Living Expenses: Not very hard   Food Insecurity: No Food Insecurity (2/14/2023)    Hunger Vital Sign     Worried About Running Out of Food in the Last Year: Never true     Ran Out of Food in the Last Year: Never true   Transportation Needs: No Transportation Needs (4/24/2023)    PRAPARE - Transportation     Lack of Transportation (Medical): No     Lack of  "Transportation (Non-Medical): No   Housing Stability: Low Risk  (2/14/2023)    Housing Stability Vital Sign     Unable to Pay for Housing in the Last Year: No     Number of Places Lived in the Last Year: 1     Unstable Housing in the Last Year: No    Received from [employer]     No results found.    Objective     /76   Pulse 84   Temp (!) 96.8 °F (36 °C)   Resp 20   Ht 5' 9\" (1.753 m)   Wt 73.9 kg (163 lb)   BMI 24.07 kg/m²         "

## 2024-09-26 DIAGNOSIS — I15.9 SECONDARY HYPERTENSION: ICD-10-CM

## 2024-09-26 RX ORDER — METOPROLOL SUCCINATE 25 MG/1
25 TABLET, EXTENDED RELEASE ORAL DAILY
Qty: 30 TABLET | Refills: 5 | Status: SHIPPED | OUTPATIENT
Start: 2024-09-26

## 2024-09-26 NOTE — PROGRESS NOTES
"Ambulatory Visit  Name: Thai Osman      : 1965      MRN: [id number]  Encounter Provider: Alvaro Nayak DO  Encounter Date: 2024   Encounter department: [facility]    Assessment & Plan  Medicare annual wellness visit, subsequent         Otitis of left ear    Orders:    cefdinir (OMNICEF) 300 mg capsule; Take 1 capsule (300 mg total) by mouth every 12 (twelve) hours for 7 days    Hypertension due to endocrine disorder    Orders:    CBC and differential; Future    Comprehensive metabolic panel; Future    History of bladder cancer         Chronic kidney disease, stage 4 (severe) ([facility])  Lab Results   Component Value Date    EGFR 14 2024    EGFR 25 02/15/2023    EGFR 2023    CREATININE 4.15 (H) 2024    CREATININE 2.60 (H) 02/15/2023    CREATININE 2.62 (H) 2023            PMR (polymyalgia rheumatica) ([facility])         Screening for cardiovascular condition    Orders:    Lipid Panel with Direct LDL reflex; Future       History of Present Illness     Presents for Medicare wellness visit with a complaint of continued ear pain    Earache   Pertinent negatives include no abdominal pain, coughing, rash, sore throat or vomiting.         Review of Systems   Constitutional:  Negative for chills and fever.   HENT:  Positive for ear pain. Negative for sore throat.    Eyes:  Negative for pain and visual disturbance.   Respiratory:  Negative for cough and shortness of breath.    Cardiovascular:  Negative for chest pain and palpitations.   Gastrointestinal:  Negative for abdominal pain and vomiting.   Genitourinary:  Negative for dysuria and hematuria.   Musculoskeletal:  Negative for arthralgias and back pain.   Skin:  Negative for color change and rash.   Neurological:  Negative for seizures and syncope.   All other systems reviewed and are negative.          Objective     /76   Pulse 84   Temp (!) 96.8 °F (36 °C)   Resp 20   Ht 5' 9\" (1.753 m)   Wt " 73.9 kg (163 lb)   BMI 24.07 kg/m²     Physical Exam  Constitutional:       Appearance: Normal appearance.   HENT:      Head: Normocephalic and atraumatic.      Right Ear: Ear canal and external ear normal. Tympanic membrane is bulging.      Left Ear: Ear canal and external ear normal. Tympanic membrane is erythematous and bulging.      Nose: Nose normal.      Mouth/Throat:      Mouth: Mucous membranes are moist.      Pharynx: Oropharynx is clear.   Eyes:      Extraocular Movements: Extraocular movements intact.      Conjunctiva/sclera: Conjunctivae normal.      Pupils: Pupils are equal, round, and reactive to light.   Cardiovascular:      Rate and Rhythm: Normal rate and regular rhythm.      Pulses: Normal pulses.      Heart sounds: Normal heart sounds.   Pulmonary:      Effort: Pulmonary effort is normal.      Breath sounds: Normal breath sounds.   Abdominal:      General: Abdomen is flat. Bowel sounds are normal.      Palpations: Abdomen is soft.   Musculoskeletal:         General: Normal range of motion.      Cervical back: Normal range of motion.   Skin:     General: Skin is warm and dry.      Capillary Refill: Capillary refill takes less than 2 seconds.   Neurological:      General: No focal deficit present.      Mental Status: He is alert and oriented to person, place, and time.   Psychiatric:         Mood and Affect: Mood normal.         Behavior: Behavior normal.

## 2024-09-30 DIAGNOSIS — I15.9 SECONDARY HYPERTENSION: ICD-10-CM

## 2024-09-30 RX ORDER — AMLODIPINE BESYLATE 10 MG/1
10 TABLET ORAL DAILY
Qty: 30 TABLET | Refills: 5 | Status: SHIPPED | OUTPATIENT
Start: 2024-09-30

## 2024-11-18 ENCOUNTER — TELEPHONE (OUTPATIENT)
Age: 59
End: 2024-11-18

## 2024-11-25 ENCOUNTER — APPOINTMENT (OUTPATIENT)
Dept: LAB | Facility: HOSPITAL | Age: 59
End: 2024-11-25
Payer: COMMERCIAL

## 2024-11-25 DIAGNOSIS — I15.2 HYPERTENSION DUE TO ENDOCRINE DISORDER: ICD-10-CM

## 2024-11-25 DIAGNOSIS — N18.4 CHRONIC KIDNEY DISEASE, STAGE IV (SEVERE) (HCC): ICD-10-CM

## 2024-11-25 DIAGNOSIS — E55.9 VITAMIN D DEFICIENCY: ICD-10-CM

## 2024-11-25 DIAGNOSIS — Z13.6 SCREENING FOR CARDIOVASCULAR CONDITION: ICD-10-CM

## 2024-11-25 LAB
25(OH)D3 SERPL-MCNC: 43.4 NG/ML (ref 30–100)
ALBUMIN SERPL BCG-MCNC: 4 G/DL (ref 3.5–5)
ALP SERPL-CCNC: 78 U/L (ref 34–104)
ALT SERPL W P-5'-P-CCNC: 12 U/L (ref 7–52)
ANION GAP SERPL CALCULATED.3IONS-SCNC: 11 MMOL/L (ref 4–13)
AST SERPL W P-5'-P-CCNC: 14 U/L (ref 13–39)
BASOPHILS # BLD AUTO: 0.08 THOUSANDS/ΜL (ref 0–0.1)
BASOPHILS NFR BLD AUTO: 1 % (ref 0–1)
BILIRUB SERPL-MCNC: 0.27 MG/DL (ref 0.2–1)
BUN SERPL-MCNC: 49 MG/DL (ref 5–25)
CALCIUM SERPL-MCNC: 8.8 MG/DL (ref 8.4–10.2)
CHLORIDE SERPL-SCNC: 110 MMOL/L (ref 96–108)
CHOLEST SERPL-MCNC: 153 MG/DL (ref ?–200)
CO2 SERPL-SCNC: 17 MMOL/L (ref 21–32)
CREAT SERPL-MCNC: 3.56 MG/DL (ref 0.6–1.3)
EOSINOPHIL # BLD AUTO: 0.62 THOUSAND/ΜL (ref 0–0.61)
EOSINOPHIL NFR BLD AUTO: 7 % (ref 0–6)
ERYTHROCYTE [DISTWIDTH] IN BLOOD BY AUTOMATED COUNT: 19.5 % (ref 11.6–15.1)
GFR SERPL CREATININE-BSD FRML MDRD: 17 ML/MIN/1.73SQ M
GLUCOSE P FAST SERPL-MCNC: 120 MG/DL (ref 65–99)
HCT VFR BLD AUTO: 39.7 % (ref 36.5–49.3)
HDLC SERPL-MCNC: 52 MG/DL
HGB BLD-MCNC: 12.5 G/DL (ref 12–17)
IMM GRANULOCYTES # BLD AUTO: 0.06 THOUSAND/UL (ref 0–0.2)
IMM GRANULOCYTES NFR BLD AUTO: 1 % (ref 0–2)
LDLC SERPL CALC-MCNC: 79 MG/DL (ref 0–100)
LYMPHOCYTES # BLD AUTO: 1.82 THOUSANDS/ΜL (ref 0.6–4.47)
LYMPHOCYTES NFR BLD AUTO: 20 % (ref 14–44)
MCH RBC QN AUTO: 26.1 PG (ref 26.8–34.3)
MCHC RBC AUTO-ENTMCNC: 31.5 G/DL (ref 31.4–37.4)
MCV RBC AUTO: 83 FL (ref 82–98)
MONOCYTES # BLD AUTO: 0.92 THOUSAND/ΜL (ref 0.17–1.22)
MONOCYTES NFR BLD AUTO: 10 % (ref 4–12)
NEUTROPHILS # BLD AUTO: 5.63 THOUSANDS/ΜL (ref 1.85–7.62)
NEUTS SEG NFR BLD AUTO: 61 % (ref 43–75)
NRBC BLD AUTO-RTO: 0 /100 WBCS
PLATELET # BLD AUTO: 280 THOUSANDS/UL (ref 149–390)
PMV BLD AUTO: 9 FL (ref 8.9–12.7)
POTASSIUM SERPL-SCNC: 3.3 MMOL/L (ref 3.5–5.3)
PROT SERPL-MCNC: 6.6 G/DL (ref 6.4–8.4)
RBC # BLD AUTO: 4.79 MILLION/UL (ref 3.88–5.62)
SODIUM SERPL-SCNC: 138 MMOL/L (ref 135–147)
TRIGL SERPL-MCNC: 110 MG/DL (ref ?–150)
WBC # BLD AUTO: 9.13 THOUSAND/UL (ref 4.31–10.16)

## 2024-11-25 PROCEDURE — 85025 COMPLETE CBC W/AUTO DIFF WBC: CPT

## 2024-11-25 PROCEDURE — 36415 COLL VENOUS BLD VENIPUNCTURE: CPT

## 2024-11-25 PROCEDURE — 82306 VITAMIN D 25 HYDROXY: CPT

## 2024-11-25 PROCEDURE — 80061 LIPID PANEL: CPT

## 2024-11-25 PROCEDURE — 80053 COMPREHEN METABOLIC PANEL: CPT

## 2025-02-05 ENCOUNTER — APPOINTMENT (OUTPATIENT)
Dept: LAB | Facility: HOSPITAL | Age: 60
End: 2025-02-05
Payer: COMMERCIAL

## 2025-02-05 DIAGNOSIS — N18.4 CHRONIC RENAL DISEASE, STAGE IV (HCC): ICD-10-CM

## 2025-02-05 LAB
ALBUMIN SERPL BCG-MCNC: 4.1 G/DL (ref 3.5–5)
ANION GAP SERPL CALCULATED.3IONS-SCNC: 12 MMOL/L (ref 4–13)
BUN SERPL-MCNC: 43 MG/DL (ref 5–25)
CALCIUM SERPL-MCNC: 8.8 MG/DL (ref 8.4–10.2)
CHLORIDE SERPL-SCNC: 106 MMOL/L (ref 96–108)
CO2 SERPL-SCNC: 20 MMOL/L (ref 21–32)
CREAT SERPL-MCNC: 3.1 MG/DL (ref 0.6–1.3)
CREAT UR-MCNC: 68.9 MG/DL
ERYTHROCYTE [DISTWIDTH] IN BLOOD BY AUTOMATED COUNT: 18.2 % (ref 11.6–15.1)
GFR SERPL CREATININE-BSD FRML MDRD: 20 ML/MIN/1.73SQ M
GLUCOSE SERPL-MCNC: 110 MG/DL (ref 65–140)
HCT VFR BLD AUTO: 48.1 % (ref 36.5–49.3)
HGB BLD-MCNC: 15 G/DL (ref 12–17)
MAGNESIUM SERPL-MCNC: 2.1 MG/DL (ref 1.9–2.7)
MCH RBC QN AUTO: 27.5 PG (ref 26.8–34.3)
MCHC RBC AUTO-ENTMCNC: 31.2 G/DL (ref 31.4–37.4)
MCV RBC AUTO: 88 FL (ref 82–98)
MICROALBUMIN UR-MCNC: 151.3 MG/L
MICROALBUMIN/CREAT 24H UR: 220 MG/G CREATININE (ref 0–30)
PHOSPHATE SERPL-MCNC: 4 MG/DL (ref 2.3–4.1)
PLATELET # BLD AUTO: 285 THOUSANDS/UL (ref 149–390)
PMV BLD AUTO: 8.5 FL (ref 8.9–12.7)
POTASSIUM SERPL-SCNC: 3.9 MMOL/L (ref 3.5–5.3)
PTH-INTACT SERPL-MCNC: 112.5 PG/ML (ref 12–88)
RBC # BLD AUTO: 5.46 MILLION/UL (ref 3.88–5.62)
SODIUM SERPL-SCNC: 138 MMOL/L (ref 135–147)
URATE SERPL-MCNC: 5.4 MG/DL (ref 3.5–8.5)
WBC # BLD AUTO: 8.98 THOUSAND/UL (ref 4.31–10.16)

## 2025-02-05 PROCEDURE — 85027 COMPLETE CBC AUTOMATED: CPT

## 2025-02-05 PROCEDURE — 82043 UR ALBUMIN QUANTITATIVE: CPT

## 2025-02-05 PROCEDURE — 82570 ASSAY OF URINE CREATININE: CPT

## 2025-02-05 PROCEDURE — 84550 ASSAY OF BLOOD/URIC ACID: CPT

## 2025-02-05 PROCEDURE — 80069 RENAL FUNCTION PANEL: CPT

## 2025-02-05 PROCEDURE — 83735 ASSAY OF MAGNESIUM: CPT

## 2025-02-05 PROCEDURE — 36415 COLL VENOUS BLD VENIPUNCTURE: CPT

## 2025-02-05 PROCEDURE — 83970 ASSAY OF PARATHORMONE: CPT

## 2025-03-19 ENCOUNTER — RA CDI HCC (OUTPATIENT)
Dept: OTHER | Facility: HOSPITAL | Age: 60
End: 2025-03-19

## 2025-04-03 ENCOUNTER — OFFICE VISIT (OUTPATIENT)
Dept: FAMILY MEDICINE CLINIC | Facility: CLINIC | Age: 60
End: 2025-04-03
Payer: COMMERCIAL

## 2025-04-03 VITALS
TEMPERATURE: 96.6 F | WEIGHT: 151 LBS | RESPIRATION RATE: 16 BRPM | HEART RATE: 76 BPM | HEIGHT: 69 IN | BODY MASS INDEX: 22.36 KG/M2 | DIASTOLIC BLOOD PRESSURE: 76 MMHG | SYSTOLIC BLOOD PRESSURE: 126 MMHG

## 2025-04-03 DIAGNOSIS — N18.4 CHRONIC KIDNEY DISEASE, STAGE 4 (SEVERE) (HCC): ICD-10-CM

## 2025-04-03 DIAGNOSIS — J20.9 ACUTE BRONCHITIS, UNSPECIFIED ORGANISM: Primary | ICD-10-CM

## 2025-04-03 DIAGNOSIS — I15.9 SECONDARY HYPERTENSION: Chronic | ICD-10-CM

## 2025-04-03 DIAGNOSIS — Z85.51 HISTORY OF BLADDER CANCER: ICD-10-CM

## 2025-04-03 PROCEDURE — G2211 COMPLEX E/M VISIT ADD ON: HCPCS | Performed by: FAMILY MEDICINE

## 2025-04-03 PROCEDURE — 99214 OFFICE O/P EST MOD 30 MIN: CPT | Performed by: FAMILY MEDICINE

## 2025-04-03 RX ORDER — AMLODIPINE BESYLATE 10 MG/1
10 TABLET ORAL DAILY
Qty: 30 TABLET | Refills: 5 | Status: SHIPPED | OUTPATIENT
Start: 2025-04-03

## 2025-04-03 RX ORDER — AZITHROMYCIN 250 MG/1
TABLET, FILM COATED ORAL
Qty: 6 TABLET | Refills: 1 | Status: SHIPPED | OUTPATIENT
Start: 2025-04-03 | End: 2025-04-08

## 2025-04-03 RX ORDER — METOPROLOL SUCCINATE 25 MG/1
25 TABLET, EXTENDED RELEASE ORAL DAILY
Qty: 30 TABLET | Refills: 5 | Status: SHIPPED | OUTPATIENT
Start: 2025-04-03

## 2025-04-03 RX ORDER — METHYLPREDNISOLONE 4 MG/1
TABLET ORAL
Qty: 21 EACH | Refills: 0 | Status: SHIPPED | OUTPATIENT
Start: 2025-04-03

## 2025-04-03 RX ORDER — DEXTROMETHORPHAN HYDROBROMIDE AND PROMETHAZINE HYDROCHLORIDE 15; 6.25 MG/5ML; MG/5ML
5 SYRUP ORAL 4 TIMES DAILY PRN
Qty: 180 ML | Refills: 0 | Status: SHIPPED | OUTPATIENT
Start: 2025-04-03

## 2025-04-03 NOTE — PROGRESS NOTES
Name: Thai Osman      : 1965      MRN: [id number]  Encounter Provider: Alvaro Nayak DO  Encounter Date: 4/3/2025   Encounter department: [facility]  :  Assessment & Plan  Acute bronchitis, unspecified organism  We will provide a Medrol Dosepak Zithromax Z-[id number] and Promethazine DM 5 cc every 4 hours as needed  Orders:  •  azithromycin (Zithromax) 250 mg tablet; Take 2 tablets (500 mg total) by mouth daily for 1 day, THEN 1 tablet (250 mg total) daily for 4 days.  •  promethazine-dextromethorphan (PHENERGAN-DM) 6.25-15 mg/5 mL oral syrup; Take 5 mL by mouth 4 (four) times a day as needed for cough  •  methylPREDNISolone 4 MG tablet therapy pack; Use as directed on package    Secondary hypertension  With a blood pressure controlled on the current regimen  Orders:  •  amLODIPine (NORVASC) 10 mg tablet; Take 1 tablet (10 mg total) by mouth daily  •  metoprolol succinate (TOPROL-XL) 25 mg 24 hr tablet; Take 1 tablet (25 mg total) by mouth daily    History of bladder cancer  Currently in remission       Chronic kidney disease, stage 4 (severe) ([facility])  Lab Results   Component Value Date    EGFR 20 2025    EGFR 17 2024    EGFR 18 (L) 2024    CREATININE 3.10 (H) 2025    CREATININE 3.56 (H) 2024    CREATININE 3.6 (H) 2024     Followed by nephrology              History of Present Illness   Patient presents with a 3-week history of coughing productive of yellowish-green phlegm      Review of Systems   Constitutional:  Negative for chills and fever.   HENT:  Negative for ear pain and sore throat.    Eyes:  Negative for pain and visual disturbance.   Respiratory:  Positive for cough. Negative for shortness of breath.    Cardiovascular:  Negative for chest pain and palpitations.   Gastrointestinal:  Negative for abdominal pain and vomiting.   Genitourinary:  Negative for dysuria and hematuria.   Musculoskeletal:  Negative for arthralgias and back pain.  "  Skin:  Negative for color change and rash.   Neurological:  Negative for seizures and syncope.   All other systems reviewed and are negative.      Objective   /76   Pulse 76   Temp (!) 96.6 °F (35.9 °C)   Resp 16   Ht 5' 9\" (1.753 m)   Wt 68.5 kg (151 lb)   BMI 22.30 kg/m²      Physical Exam  Constitutional:       Appearance: Normal appearance.   HENT:      Head: Normocephalic and atraumatic.      Right Ear: Tympanic membrane, ear canal and external ear normal.      Left Ear: Tympanic membrane, ear canal and external ear normal.      Nose: Nose normal.      Mouth/Throat:      Mouth: Mucous membranes are moist.      Pharynx: Oropharynx is clear.   Eyes:      Extraocular Movements: Extraocular movements intact.      Conjunctiva/sclera: Conjunctivae normal.      Pupils: Pupils are equal, round, and reactive to light.   Cardiovascular:      Rate and Rhythm: Normal rate and regular rhythm.      Pulses: Normal pulses.      Heart sounds: Normal heart sounds.   Pulmonary:      Effort: Pulmonary effort is normal.      Breath sounds: Normal breath sounds.   Abdominal:      General: Abdomen is flat. Bowel sounds are normal.      Palpations: Abdomen is soft.   Musculoskeletal:         General: Normal range of motion.      Cervical back: Normal range of motion.   Skin:     General: Skin is warm and dry.      Capillary Refill: Capillary refill takes less than 2 seconds.   Neurological:      General: No focal deficit present.      Mental Status: He is alert and oriented to person, place, and time.   Psychiatric:         Mood and Affect: Mood normal.         Behavior: Behavior normal.         "

## 2025-06-19 ENCOUNTER — VBI (OUTPATIENT)
Dept: ADMINISTRATIVE | Facility: OTHER | Age: 60
End: 2025-06-19

## 2025-06-19 NOTE — TELEPHONE ENCOUNTER
06/19/25 2:08 PM     Chart reviewed for [facility]: Cologuard was/were submitted to the patient's insurance.     Renetta Santillan   [facility]

## 2025-08-18 ENCOUNTER — OFFICE VISIT (OUTPATIENT)
Dept: FAMILY MEDICINE CLINIC | Facility: CLINIC | Age: 60
End: 2025-08-18
Payer: COMMERCIAL

## 2025-08-18 VITALS
HEIGHT: 69 IN | WEIGHT: 158 LBS | BODY MASS INDEX: 23.4 KG/M2 | HEART RATE: 84 BPM | RESPIRATION RATE: 20 BRPM | TEMPERATURE: 96.4 F | SYSTOLIC BLOOD PRESSURE: 126 MMHG | DIASTOLIC BLOOD PRESSURE: 76 MMHG

## 2025-08-18 DIAGNOSIS — Z85.51 HISTORY OF BLADDER CANCER: ICD-10-CM

## 2025-08-18 DIAGNOSIS — L73.1 INGROWN HAIR: ICD-10-CM

## 2025-08-18 DIAGNOSIS — M35.3 PMR (POLYMYALGIA RHEUMATICA) (HCC): Primary | ICD-10-CM

## 2025-08-18 DIAGNOSIS — H66.93 OTITIS OF BOTH EARS: ICD-10-CM

## 2025-08-18 DIAGNOSIS — Z93.59 OTHER CYSTOSTOMY STATUS (HCC): ICD-10-CM

## 2025-08-18 DIAGNOSIS — N18.5 CHRONIC KIDNEY DISEASE (CKD), STAGE V (HCC): ICD-10-CM

## 2025-08-18 DIAGNOSIS — C67.9 MALIGNANT NEOPLASM OF URINARY BLADDER, UNSPECIFIED SITE (HCC): ICD-10-CM

## 2025-08-18 DIAGNOSIS — Z98.890 HISTORY OF LUMBAR LAMINECTOMY FOR SPINAL CORD DECOMPRESSION: ICD-10-CM

## 2025-08-18 DIAGNOSIS — N18.4 CHRONIC KIDNEY DISEASE, STAGE 4 (SEVERE) (HCC): ICD-10-CM

## 2025-08-18 PROCEDURE — G2211 COMPLEX E/M VISIT ADD ON: HCPCS | Performed by: FAMILY MEDICINE

## 2025-08-18 PROCEDURE — 99214 OFFICE O/P EST MOD 30 MIN: CPT | Performed by: FAMILY MEDICINE

## 2025-08-18 RX ORDER — PREDNISONE 1 MG/1
1 TABLET ORAL DAILY
Qty: 90 TABLET | Refills: 1 | Status: SHIPPED | OUTPATIENT
Start: 2025-08-18

## 2025-08-18 RX ORDER — BETAMETHASONE DIPROPIONATE 0.5 MG/G
CREAM TOPICAL 2 TIMES DAILY
Qty: 45 G | Refills: 1 | Status: SHIPPED | OUTPATIENT
Start: 2025-08-18

## 2025-08-18 RX ORDER — FLUTICASONE PROPIONATE 50 MCG
1 SPRAY, SUSPENSION (ML) NASAL DAILY
Qty: 3 G | Refills: 1 | Status: SHIPPED | OUTPATIENT
Start: 2025-08-18

## 2025-08-18 RX ORDER — PREDNISONE 1 MG/1
1 TABLET ORAL DAILY
COMMUNITY
End: 2025-08-18 | Stop reason: SDUPTHER

## (undated) DEVICE — PLUMEPEN PRO 10FT

## (undated) DEVICE — CULTURE TUBE ANAEROBIC

## (undated) DEVICE — BETHLEHEM UNIVERSAL MINOR GEN: Brand: CARDINAL HEALTH

## (undated) DEVICE — 4-PORT MANIFOLD: Brand: NEPTUNE 2

## (undated) DEVICE — SUT MONOCRYL 4-0 PS-2 27 IN Y426H

## (undated) DEVICE — CULTURE TUBE AEROBIC

## (undated) DEVICE — MEDI-VAC YANKAUER SUCTION HANDLE W/STRAIGHT TIP & CONTROL VENT: Brand: CARDINAL HEALTH

## (undated) DEVICE — POOLE SUCTION INSTRUMENT WITH REMOVABLE SHEATH AND PREATTACHED 6' (1.8 M) CLEAR PLASTIC TUBING: Brand: POOLE

## (undated) DEVICE — SUT VICRYL 3-0 SH 27 IN J416H

## (undated) DEVICE — SUT VICRYL 3-0 REEL 54 IN J285G

## (undated) DEVICE — INTENDED FOR TISSUE SEPARATION, AND OTHER PROCEDURES THAT REQUIRE A SHARP SURGICAL BLADE TO PUNCTURE OR CUT.: Brand: BARD-PARKER SAFETY BLADES SIZE 15, STERILE

## (undated) DEVICE — GLOVE INDICATOR PI UNDERGLOVE SZ 7 BLUE

## (undated) DEVICE — ADHESIVE SKIN HIGH VISCOSITY EXOFIN 1ML

## (undated) DEVICE — GLOVE SRG BIOGEL 7

## (undated) DEVICE — CHLORAPREP HI-LITE 26ML ORANGE

## (undated) DEVICE — KERLIX BANDAGE ROLL: Brand: KERLIX

## (undated) DEVICE — CUP MEDICINE 1OZ 5000/CS 50/PLT: Brand: MEDEGEN MEDICAL PRODUCTS, LLC

## (undated) DEVICE — POOLE SUCTION HANDLE W/TUBING: Brand: CARDINAL HEALTH

## (undated) DEVICE — FRAZIER SUCTION INSTRUMENT 18 FR W/OBTURATOR, NO CONTROL VENT: Brand: FRAZIER

## (undated) DEVICE — SYRINGE 10ML LL

## (undated) DEVICE — NEEDLE 25G X 1 1/2

## (undated) DEVICE — THE SIMPULSE SOLO SYSTEM WITH ULTREX RETRACTABLE SPLASH SHIELD TIP: Brand: SIMPULSE SOLO